# Patient Record
Sex: FEMALE | Race: WHITE | Employment: FULL TIME | URBAN - NONMETROPOLITAN AREA
[De-identification: names, ages, dates, MRNs, and addresses within clinical notes are randomized per-mention and may not be internally consistent; named-entity substitution may affect disease eponyms.]

---

## 2017-01-27 ENCOUNTER — OFFICE VISIT (OUTPATIENT)
Dept: OBGYN | Facility: OTHER | Age: 37
End: 2017-01-27
Attending: NURSE PRACTITIONER
Payer: COMMERCIAL

## 2017-01-27 VITALS
OXYGEN SATURATION: 100 % | DIASTOLIC BLOOD PRESSURE: 62 MMHG | SYSTOLIC BLOOD PRESSURE: 108 MMHG | HEART RATE: 75 BPM | BODY MASS INDEX: 27.15 KG/M2 | RESPIRATION RATE: 15 BRPM | WEIGHT: 173 LBS | HEIGHT: 67 IN

## 2017-01-27 DIAGNOSIS — N76.0 VAGINITIS AND VULVOVAGINITIS: ICD-10-CM

## 2017-01-27 DIAGNOSIS — N76.0 BV (BACTERIAL VAGINOSIS): ICD-10-CM

## 2017-01-27 DIAGNOSIS — B96.89 BV (BACTERIAL VAGINOSIS): ICD-10-CM

## 2017-01-27 DIAGNOSIS — Z11.3 SCREEN FOR STD (SEXUALLY TRANSMITTED DISEASE): Primary | ICD-10-CM

## 2017-01-27 LAB
MICRO REPORT STATUS: ABNORMAL
SPECIMEN SOURCE: ABNORMAL
WET PREP SPEC: ABNORMAL

## 2017-01-27 PROCEDURE — 87491 CHLMYD TRACH DNA AMP PROBE: CPT | Mod: 90 | Performed by: NURSE PRACTITIONER

## 2017-01-27 PROCEDURE — 99000 SPECIMEN HANDLING OFFICE-LAB: CPT | Performed by: NURSE PRACTITIONER

## 2017-01-27 PROCEDURE — 87210 SMEAR WET MOUNT SALINE/INK: CPT | Performed by: NURSE PRACTITIONER

## 2017-01-27 PROCEDURE — 99213 OFFICE O/P EST LOW 20 MIN: CPT | Performed by: NURSE PRACTITIONER

## 2017-01-27 PROCEDURE — 87591 N.GONORRHOEAE DNA AMP PROB: CPT | Mod: 90 | Performed by: NURSE PRACTITIONER

## 2017-01-27 RX ORDER — METRONIDAZOLE 500 MG/1
500 TABLET ORAL 2 TIMES DAILY
Qty: 14 TABLET | Refills: 0 | Status: SHIPPED | OUTPATIENT
Start: 2017-01-27 | End: 2017-02-16

## 2017-01-27 ASSESSMENT — PAIN SCALES - GENERAL: PAINLEVEL: NO PAIN (0)

## 2017-01-27 NOTE — MR AVS SNAPSHOT
After Visit Summary   1/27/2017    Sheridan Roldan    MRN: [id number]           Patient Information     Date Of Birth          1980        Visit Information        Provider Department      1/27/2017 9:15 AM Anne-Marie Cain NP [facility]        Today's Diagnoses     Screen for STD (sexually transmitted disease)    -  1     Vaginitis and vulvovaginitis           Care Instructions    Follow up as needed        Follow-ups after your visit        Your next 10 appointments already scheduled     May 24, 2017  9:00 AM   (Arrive by 8:45 AM)   Office Visit with Chioma Wiggins MD   [facility] ([facility])    [address]  [facility] [address]   [phone]           Bring a current list of meds and any records pertaining to this visit.  For Physicals, please bring immunization records and any forms needing to be filled out.  Please arrive 10 minutes early to complete paperwork.              Who to contact     If you have questions or need follow up information about today's clinic visit or your schedule please contact [facility] directly at [phone].  Normal or non-critical lab and imaging results will be communicated to you by [employer], letter or phone within 4 business days after the clinic has received the results. If you do not hear from us within 7 days, please contact the clinic through [employer] or phone. If you have a critical or abnormal lab result, we will notify you by phone as soon as possible.  Submit refill requests through [employer] or call your pharmacy and they will forward the refill request to us. Please allow 3 business days for your refill to be completed.          Additional Information About Your Visit        MyChart Information     [employer]es[employer] gives you secure access to your electronic health record. If you see a primary care provider, you can also send messages to your care team and make appointments. If you have questions, please  "call your primary care clinic.  If you do not have a primary care provider, please call [phone] and they will assist you.        Care EveryWhere ID     This is your Care EveryWhere ID. This could be used by other organizations to access your [place] medical records  [id number]        Your Vitals Were     Pulse Respirations Height BMI (Body Mass Index) Pulse Oximetry       75 15 5' 7\" (1.702 m) 27.09 kg/m2 100%        Blood Pressure from Last 3 Encounters:   01/27/17 108/62   08/08/16 110/64   08/01/16 120/81    Weight from Last 3 Encounters:   01/27/17 173 lb (78.472 kg)   08/08/16 173 lb (78.472 kg)   08/01/16 175 lb (79.379 kg)              We Performed the Following     Chlamydia trachomatis PCR     Neisseria gonorrhoeae PCR     Wet prep        Primary Care Provider Office Phone # Fax #    SARAH Bond [phone]       [facility] [address] [place] [id number]        Thank you!     [facility]  [facility]. Our goal is always to provide you with excellent care. Hearing back from our patients is one way we can continue to improve our services. Please take a few minutes to complete the written survey that you may receive in the mail after your visit with us. Thank you!             Your Updated Medication List - Protect others around you: Learn how to safely use, store and throw away your medicines at www.disposemymeds.org.          This list is accurate as of: 1/27/17  9:25 AM.  Always use your most recent med list.                   Brand Name Dispense Instructions for use    escitalopram 10 MG tablet    LEXAPRO    90 tablet    Take 1 tablet (10 mg) by mouth daily       levothyroxine 75 MCG tablet    SYNTHROID    90 tablet    Take 1 tablet (75 mcg) by mouth daily       MIRENA (52 MG) 20 MCG/24HR IUD   Generic drug:  levonorgestrel      1 each by Intrauterine route continuous         "

## 2017-01-27 NOTE — NURSING NOTE
"Chief Complaint   Patient presents with     STD     Screening       Initial /62 mmHg  Pulse 75  Resp 15  Ht 5' 7\" (1.702 m)  Wt 173 lb (78.472 kg)  BMI 27.09 kg/m2  SpO2 100% Estimated body mass index is 27.09 kg/(m^2) as calculated from the following:    Height as of this encounter: 5' 7\" (1.702 m).    Weight as of this encounter: 173 lb (78.472 kg).  BP completed using cuff size: andrea Maxwell      "

## 2017-01-27 NOTE — PROGRESS NOTES
"[facility]                HPI   C/O recent vaginal itching and irritation.  Mirena iud in place.  Mild yellow and pink vaginal discharge.             Medications:     Current Outpatient Prescriptions Ordered in Epic   Medication     escitalopram (LEXAPRO) 10 MG tablet     levothyroxine (SYNTHROID) 75 MCG tablet     levonorgestrel (MIRENA) 20 MCG/24HR IUD     No current Epic-ordered facility-administered medications on file.                Allergies:   Erythromycin and Penicillins         Review of Systems:   The 5 point Review of Systems is negative other than noted in the HPI                     Physical Exam:   Blood pressure 108/62, pulse 75, resp. rate 15, height 5' 7\" (1.702 m), weight 173 lb (78.472 kg), SpO2 100 %.  Constitutional:   awake, alert, cooperative, no apparent distress, and appears stated age     Genitounirinary:   External Genitalia:  General appearance; normal  Vagina:  Lesions absent, Abnormal findings: yellow curdish discharge without noticeable odor.   Cervix:  Lesions absent, Tenderness absent, IUD strings visible.              Data:   No results found for this or any previous visit (from the past 24 hour(s)).            Assessment and Plan:   Vaginitis - wet prep and G/C completed.  Will treat according to findings.      KIMBERLYN Barbour  1/27/2017  9:22 AM  "

## 2017-01-31 LAB
C TRACH DNA SPEC QL NAA+PROBE: NORMAL
N GONORRHOEA DNA SPEC QL NAA+PROBE: NORMAL
SPECIMEN SOURCE: NORMAL
SPECIMEN SOURCE: NORMAL

## 2017-02-16 ENCOUNTER — OFFICE VISIT (OUTPATIENT)
Dept: FAMILY MEDICINE | Facility: OTHER | Age: 37
End: 2017-02-16
Attending: PHYSICIAN ASSISTANT
Payer: COMMERCIAL

## 2017-02-16 VITALS
DIASTOLIC BLOOD PRESSURE: 73 MMHG | RESPIRATION RATE: 17 BRPM | HEART RATE: 69 BPM | OXYGEN SATURATION: 98 % | TEMPERATURE: 98.3 F | SYSTOLIC BLOOD PRESSURE: 121 MMHG

## 2017-02-16 DIAGNOSIS — M25.50 MULTIPLE JOINT PAIN: Primary | ICD-10-CM

## 2017-02-16 DIAGNOSIS — E04.9 GOITER: ICD-10-CM

## 2017-02-16 LAB
ERYTHROCYTE [SEDIMENTATION RATE] IN BLOOD BY WESTERGREN METHOD: 4 MM/H (ref 0–20)
TSH SERPL DL<=0.005 MIU/L-ACNC: 1.16 MU/L (ref 0.4–4)
URATE SERPL-MCNC: 2.5 MG/DL (ref 2.6–6)

## 2017-02-16 PROCEDURE — 99214 OFFICE O/P EST MOD 30 MIN: CPT | Performed by: PHYSICIAN ASSISTANT

## 2017-02-16 PROCEDURE — 86618 LYME DISEASE ANTIBODY: CPT | Mod: 90 | Performed by: PHYSICIAN ASSISTANT

## 2017-02-16 PROCEDURE — 86431 RHEUMATOID FACTOR QUANT: CPT | Mod: 90 | Performed by: PHYSICIAN ASSISTANT

## 2017-02-16 PROCEDURE — 86038 ANTINUCLEAR ANTIBODIES: CPT | Mod: 90 | Performed by: PHYSICIAN ASSISTANT

## 2017-02-16 PROCEDURE — 99000 SPECIMEN HANDLING OFFICE-LAB: CPT | Performed by: PHYSICIAN ASSISTANT

## 2017-02-16 PROCEDURE — 84550 ASSAY OF BLOOD/URIC ACID: CPT | Performed by: PHYSICIAN ASSISTANT

## 2017-02-16 PROCEDURE — 84443 ASSAY THYROID STIM HORMONE: CPT | Performed by: PHYSICIAN ASSISTANT

## 2017-02-16 PROCEDURE — 36415 COLL VENOUS BLD VENIPUNCTURE: CPT | Performed by: PHYSICIAN ASSISTANT

## 2017-02-16 PROCEDURE — 85652 RBC SED RATE AUTOMATED: CPT | Performed by: PHYSICIAN ASSISTANT

## 2017-02-16 ASSESSMENT — PAIN SCALES - GENERAL: PAINLEVEL: NO PAIN (1)

## 2017-02-16 NOTE — PATIENT INSTRUCTIONS
Thank you for choosing [facility].   I appreciate the opportunity to serve you and look forward to supporting your healthcare needs in the future. Please contact me with any questions or concerns that you may have.    Sincerely,    Danielle Waller RN, PA-C

## 2017-02-16 NOTE — MR AVS SNAPSHOT
After Visit Summary   2/16/2017    Sheridan Roldan    MRN: [id number]           Patient Information     Date Of Birth          1980        Visit Information        Provider Department      2/16/2017 10:45 AM Danielle Waller PA [facility]        Today's Diagnoses     Multiple joint pain    -  1    Goiter          Care Instructions    Thank you for choosing [facility].   I appreciate the opportunity to serve you and look forward to supporting your healthcare needs in the future. Please contact me with any questions or concerns that you may have.    Sincerely,    Danielle Waller RN, PA-C           Follow-ups after your visit        Follow-up notes from your care team     Return if symptoms worsen or fail to improve.      Your next 10 appointments already scheduled     May 24, 2017  9:00 AM CDT   (Arrive by 8:45 AM)   Office Visit with Chioma Wiggins MD   [facility] ([facility])    [address]  [facility] [address]   [phone]           Bring a current list of meds and any records pertaining to this visit.  For Physicals, please bring immunization records and any forms needing to be filled out.  Please arrive 10 minutes early to complete paperwork.              Who to contact     If you have questions or need follow up information about today's clinic visit or your schedule please contact [facility] directly at [phone].  Normal or non-critical lab and imaging results will be communicated to you by MyChart, letter or phone within 4 business days after the clinic has received the results. If you do not hear from us within 7 days, please contact the clinic through MyChart or phone. If you have a critical or abnormal lab result, we will notify you by phone as soon as possible.  Submit refill requests through My-Apps or call your pharmacy and they will forward the refill request to us. Please allow 3 business days for your  refill to be completed.          Additional Information About Your Visit        MyChart Information     [employer] gives you secure access to your electronic health record. If you see a primary care provider, you can also send messages to your care team and make appointments. If you have questions, please call your primary care clinic.  If you do not have a primary care provider, please call [phone] and they will assist you.        Care EveryWhere ID     This is your Care EveryWhere ID. This could be used by other organizations to access your [place] medical records  [id number]        Your Vitals Were     Pulse Temperature Respirations Pulse Oximetry          69 98.3  F (36.8  C) 17 98%         Blood Pressure from Last 3 Encounters:   02/16/17 121/73   01/27/17 108/62   08/08/16 110/64    Weight from Last 3 Encounters:   01/27/17 173 lb (78.5 kg)   08/08/16 173 lb (78.5 kg)   08/01/16 175 lb (79.4 kg)              We Performed the Following     Antinuclear antibody screen by EIA     Erythrocyte sedimentation rate auto     Lyme Disease Leidy with reflex to WB Serum     Rheumatoid factor     TSH with free T4 reflex     Uric acid        Primary Care Provider Office Phone # Fax #    SARAH Bond [phone]       [address] [address]        Thank you!     Thank you for choosing [facility]  for your care. Our goal is always to provide you with excellent care. Hearing back from our patients is one way we can continue to improve our services. Please take a few minutes to complete the written survey that you may receive in the mail after your visit with us. Thank you!             Your Updated Medication List - Protect others around you: Learn how to safely use, store and throw away your medicines at www.disposemymeds.org.          This list is accurate as of: 2/16/17 11:57 AM.  Always use your most recent med list.                   Brand  Name Dispense Instructions for use    escitalopram 10 MG tablet    LEXAPRO    90 tablet    Take 1 tablet (10 mg) by mouth daily       levothyroxine 75 MCG tablet    SYNTHROID    90 tablet    Take 1 tablet (75 mcg) by mouth daily       MIRENA (52 MG) 20 MCG/24HR IUD   Generic drug:  levonorgestrel      1 each by Intrauterine route continuous       VITAMIN C PO      Take 500 mg by mouth daily       VITAMIN D (CHOLECALCIFEROL) PO      Take 6,000 Units by mouth daily

## 2017-02-16 NOTE — NURSING NOTE
"Chief Complaint   Patient presents with     Derm Problem       Initial /73  Pulse 69  Temp 98.3  F (36.8  C)  Resp 17  SpO2 98% Estimated body mass index is 27.1 kg/(m^2) as calculated from the following:    Height as of 1/27/17: 5' 7\" (1.702 m).    Weight as of 1/27/17: 173 lb (78.5 kg).  Medication Reconciliation: complete  "

## 2017-02-16 NOTE — PROGRESS NOTES
SUBJECTIVE:                                                    Sheridan Roldan is a 36 year old female who presents to clinic today for the following health issues:        Musculoskeletal problem/pain      Duration: 1 month     Description  Location: right great toe, right finger, left ankle stiffness     Intensity:  mild    Accompanying signs and symptoms: red rash on top of right hand, joint pain,     History  Previous similar problem: no   Previous evaluation:  none    Precipitating or alleviating factors:  Trauma or overuse: no   Aggravating factors include: none     Therapies tried and outcome: nothing           Problem list and histories reviewed & adjusted, as indicated.  Additional history: as documented    Patient Active Problem List   Diagnosis     Family planning     Encounter for routine gynecological examination     Fatigue     Mastodynia     Goiter     Chronic tonsillitis     Tonsillar hypertrophy     Papanicolaou smear of cervix with low grade squamous intraepithelial lesion (LGSIL)     High risk HPV infection     RC II (cervical intraepithelial neoplasia II)     NO SHOW     Past Surgical History   Procedure Laterality Date     Abdominoplasty       Mammoplasty augmentation bilateral       Conization leep N/A 8/1/2016     Procedure: CONIZATION LEEP;  Surgeon: Chioma Wiggins MD;  Location: [facility] OR       Social History   Substance Use Topics     Smoking status: Former Smoker     Quit date: 1/1/2002     Smokeless tobacco: Never Used     Alcohol use Yes      Comment: socially     Family History   Problem Relation Age of Onset     Hypertension Father      Other - See Comments Father      lymphoma     Uterine Cancer Cousin          Current Outpatient Prescriptions   Medication Sig Dispense Refill     VITAMIN D, CHOLECALCIFEROL, PO Take 6,000 Units by mouth daily       Ascorbic Acid (VITAMIN C PO) Take 500 mg by mouth daily       escitalopram (LEXAPRO) 10 MG tablet Take 1 tablet (10 mg) by mouth  daily 90 tablet 3     levothyroxine (SYNTHROID) 75 MCG tablet Take 1 tablet (75 mcg) by mouth daily 90 tablet 4     levonorgestrel (MIRENA) 20 MCG/24HR IUD 1 each by Intrauterine route continuous        Allergies   Allergen Reactions     Erythromycin Nausea and Vomiting     Erythromycin base     Penicillins Rash     BP Readings from Last 3 Encounters:   02/16/17 121/73   01/27/17 108/62   08/08/16 110/64    Wt Readings from Last 3 Encounters:   01/27/17 173 lb (78.5 kg)   08/08/16 173 lb (78.5 kg)   08/01/16 175 lb (79.4 kg)                  Problem list, Medication list, Allergies, and Medical/Social/Surgical histories reviewed in [facility] and updated as appropriate.    ROS:  Constitutional, neuro, ENT, endocrine, pulmonary, cardiac, gastrointestinal, genitourinary, musculoskeletal, integument and psychiatric systems are negative, except as otherwise noted.    OBJECTIVE:                                                    /73  Pulse 69  Temp 98.3  F (36.8  C)  Resp 17  SpO2 98%  There is no height or weight on file to calculate BMI.  GENERAL APPEARANCE: healthy, alert and no distress  EYES: Eyes grossly normal to inspection, PERRL and conjunctivae and sclerae normal  HENT: ear canals and TM's normal and nose and mouth without ulcers or lesions  NECK: no adenopathy, no asymmetry, masses, or scars and thyroid normal to palpation  RESP: lungs clear to auscultation - no rales, rhonchi or wheezes  CV: regular rates and rhythm, normal S1 S2, no S3 or S4 and no murmur, click or rub  LYMPHATICS: normal ant/post cervical and supraclavicular nodes  ABDOMEN: soft, nontender, without hepatosplenomegaly or masses and bowel sounds normal  MS: extremities normal- no gross deformities noted. Stiffness in her 3rd digit.  Great toe on her left foot is painful with a large bunion.   SKIN: no suspicious lesions  round  Lesion on her hand that is not warm or painful.    NEURO: Normal strength and tone, mentation intact and speech  normal  PSYCH: mentation appears normal and affect normal/bright    Diagnostic test results:  Diagnostic Test Results:  No results found for this or any previous visit (from the past 24 hour(s)).   .Rheumatology panel is negative.   ASSESSMENT/PLAN:                                                    1. Multiple joint pain  She will be having below done.  This is scattered. No pattern.  Joints are stiff.  No warmth  Noted.  Small lesion was present before and went away with Cortizone.   Does look nummular.   Discussion if they recur again I would like to bx the lesion.   Could be more of a skin manifestation rather than systemic.   - Erythrocyte sedimentation rate auto  - Rheumatoid factor  - Antinuclear antibody screen by EIA  - Uric acid  - Lyme Disease Leidy with reflex to WB Serum    35 minutes in visit over 50 % in face to face visit.   See Patient Instructions    SARAH Brennan  [facility]

## 2017-02-19 LAB — RHEUMATOID FACT SER NEPH-ACNC: <20 IU/ML (ref 0–20)

## 2017-02-20 LAB
ANA SER QL IA: NORMAL
B BURGDOR IGG+IGM SER QL: 0.17 (ref 0–0.89)

## 2017-03-03 DIAGNOSIS — E04.9 GOITER: ICD-10-CM

## 2017-03-03 RX ORDER — LEVOTHYROXINE SODIUM 75 UG/1
75 TABLET ORAL DAILY
Qty: 90 TABLET | Refills: 4 | Status: SHIPPED | OUTPATIENT
Start: 2017-03-03 | End: 2018-03-21

## 2017-05-24 ENCOUNTER — OFFICE VISIT (OUTPATIENT)
Dept: OBGYN | Facility: OTHER | Age: 37
End: 2017-05-24
Attending: OBSTETRICS & GYNECOLOGY
Payer: COMMERCIAL

## 2017-05-24 VITALS
SYSTOLIC BLOOD PRESSURE: 90 MMHG | DIASTOLIC BLOOD PRESSURE: 62 MMHG | HEIGHT: 67 IN | WEIGHT: 171 LBS | BODY MASS INDEX: 26.84 KG/M2 | HEART RATE: 76 BPM

## 2017-05-24 DIAGNOSIS — Z12.4 SCREENING FOR CERVICAL CANCER: ICD-10-CM

## 2017-05-24 DIAGNOSIS — Z20.2 POSSIBLE EXPOSURE TO STD: ICD-10-CM

## 2017-05-24 DIAGNOSIS — Z00.00 ROUTINE GENERAL MEDICAL EXAMINATION AT A HEALTH CARE FACILITY: Primary | ICD-10-CM

## 2017-05-24 LAB
MICRO REPORT STATUS: ABNORMAL
SPECIMEN SOURCE: ABNORMAL
WET PREP SPEC: ABNORMAL

## 2017-05-24 PROCEDURE — 87591 N.GONORRHOEAE DNA AMP PROB: CPT | Mod: 90 | Performed by: OBSTETRICS & GYNECOLOGY

## 2017-05-24 PROCEDURE — 99000 SPECIMEN HANDLING OFFICE-LAB: CPT | Performed by: OBSTETRICS & GYNECOLOGY

## 2017-05-24 PROCEDURE — 87624 HPV HI-RISK TYP POOLED RSLT: CPT | Mod: 90 | Performed by: OBSTETRICS & GYNECOLOGY

## 2017-05-24 PROCEDURE — 88142 CYTOPATH C/V THIN LAYER: CPT | Performed by: OBSTETRICS & GYNECOLOGY

## 2017-05-24 PROCEDURE — 99395 PREV VISIT EST AGE 18-39: CPT | Performed by: OBSTETRICS & GYNECOLOGY

## 2017-05-24 PROCEDURE — 87210 SMEAR WET MOUNT SALINE/INK: CPT | Performed by: OBSTETRICS & GYNECOLOGY

## 2017-05-24 PROCEDURE — 87491 CHLMYD TRACH DNA AMP PROBE: CPT | Mod: 90 | Performed by: OBSTETRICS & GYNECOLOGY

## 2017-05-24 ASSESSMENT — ANXIETY QUESTIONNAIRES
6. BECOMING EASILY ANNOYED OR IRRITABLE: NOT AT ALL
1. FEELING NERVOUS, ANXIOUS, OR ON EDGE: NOT AT ALL
5. BEING SO RESTLESS THAT IT IS HARD TO SIT STILL: NOT AT ALL
7. FEELING AFRAID AS IF SOMETHING AWFUL MIGHT HAPPEN: NOT AT ALL
3. WORRYING TOO MUCH ABOUT DIFFERENT THINGS: SEVERAL DAYS
IF YOU CHECKED OFF ANY PROBLEMS ON THIS QUESTIONNAIRE, HOW DIFFICULT HAVE THESE PROBLEMS MADE IT FOR YOU TO DO YOUR WORK, TAKE CARE OF THINGS AT HOME, OR GET ALONG WITH OTHER PEOPLE: NOT DIFFICULT AT ALL
GAD7 TOTAL SCORE: 1
2. NOT BEING ABLE TO STOP OR CONTROL WORRYING: NOT AT ALL

## 2017-05-24 ASSESSMENT — PATIENT HEALTH QUESTIONNAIRE - PHQ9: 5. POOR APPETITE OR OVEREATING: NOT AT ALL

## 2017-05-24 NOTE — MR AVS SNAPSHOT
After Visit Summary   5/24/2017    Sheridan Roldan    MRN: [id number]           Patient Information     Date Of Birth          1980        Visit Information        Provider Department      5/24/2017 9:00 AM [facility]        Today's Diagnoses     Routine general medical examination at a health care facility    -  1    Possible exposure to STD        Screening for cervical cancer          Care Instructions    Pap smear and STD testing done today.  Lab work to be done tomorrow.  Return for annual exam.                      Follow-ups after your visit        Future tests that were ordered for you today     Open Future Orders        Priority Expected Expires Ordered    Anti Treponema Routine 5/24/2017 7/28/2017 5/24/2017    Hepatitis B surface antigen Routine 5/24/2017 7/28/2017 5/24/2017    Hepatitis C antibody Routine 5/24/2017 7/28/2017 5/24/2017    HIV Antigen Antibody Combo Routine 5/24/2017 7/28/2017 5/24/2017    TSH Routine 5/25/2017 7/28/2017 5/24/2017    Lipid Profile Routine 5/25/2017 7/28/2017 5/24/2017    Glucose Routine 5/25/2017 7/28/2017 5/24/2017    CBC with platelets Routine 5/25/2017 7/28/2017 5/24/2017    Hemoglobin A1c Routine 5/25/2017 7/28/2017 5/24/2017    Vitamin D Deficiency Routine 5/25/2017 7/28/2017 5/24/2017            Who to contact     If you have questions or need follow up information about today's clinic visit or your schedule please contact [facility] directly at [phone].  Normal or non-critical lab and imaging results will be communicated to you by MyChart, letter or phone within 4 business days after the clinic has received the results. If you do not hear from us within 7 days, please contact the clinic through MyChart or phone. If you have a critical or abnormal lab result, we will notify you by phone as soon as possible.  Submit refill requests through [employer] or call your pharmacy and they will forward  "the refill request to us. Please allow 3 business days for your refill to be completed.          Additional Information About Your Visit        ZangZinghart Information     [employer] gives you secure access to your electronic health record. If you see a primary care provider, you can also send messages to your care team and make appointments. If you have questions, please call your primary care clinic.  If you do not have a primary care provider, please call [phone] and they will assist you.        Care EveryWhere ID     This is your Care EveryWhere ID. This could be used by other organizations to access your [place] medical records  [id number]        Your Vitals Were     Pulse Height BMI (Body Mass Index)             76 5' 7\" (1.702 m) 26.78 kg/m2          Blood Pressure from Last 3 Encounters:   05/24/17 90/62   02/16/17 121/73   01/27/17 108/62    Weight from Last 3 Encounters:   05/24/17 171 lb (77.6 kg)   01/27/17 173 lb (78.5 kg)   08/08/16 173 lb (78.5 kg)              We Performed the Following     A pap thin layer screen with  HPV - recommended age 30 - 65 years (select HPV order below)     CHLAMYDIA TRACHOMATIS PCR     HPV High Risk Types DNA Cervical     NEISSERIA GONORRHOEA PCR     Wet prep        Primary Care Provider Office Phone # Fax #    SARAH Bond [phone]       [facility] [address] [place] [address]  [facility] [address]        Thank you!     Thank you for choosing [facility]  for your care. Our goal is always to provide you with excellent care. Hearing back from our patients is one way we can continue to improve our services. Please take a few minutes to complete the written survey that you may receive in the mail after your visit with us. Thank you!             Your Updated Medication List - Protect others around you: Learn how to safely use, store and throw away your medicines at www.disposemymeds.org.          This list is accurate as of: 5/24/17  " 9:16 AM.  Always use your most recent med list.                   Brand Name Dispense Instructions for use    escitalopram 10 MG tablet    LEXAPRO    90 tablet    Take 1 tablet (10 mg) by mouth daily       levothyroxine 75 MCG tablet    SYNTHROID    90 tablet    Take 1 tablet (75 mcg) by mouth daily       MIRENA (52 MG) 20 MCG/24HR IUD   Generic drug:  levonorgestrel      1 each by Intrauterine route continuous       VITAMIN C PO      Take 500 mg by mouth daily       VITAMIN D (CHOLECALCIFEROL) PO      Take 6,000 Units by mouth daily

## 2017-05-24 NOTE — PROGRESS NOTES
ANNUAL    Sheridan is a 36 year old  female who presents for annual exam.   yc 9  LC 8#  Gdm n  hpt n  No LMP recorded. Patient is not currently having periods (Reason: IUD).  Menses: n pain n Contraception Mirena.  Planning pregnancy: n  Concerns in addition to routine health maintenance?  BTO.  GYNECOLOGIC HISTORY:   Surgery: n  History sexually transmitted infections:No STD history   Safe?  y  STI testing offered?  y  History of abnormal Pap smear: y  Problems with sex? n  Family history of: breast CA: n   Uterine pc  ovarian CA: n   colon CA: n    HEALTH MAINTENANCE:  Cholesterol: (  Cholesterol   Date Value Ref Range Status   2015 113 <200 mg/dL Final     Comment:     LDL Cholesterol is the primary guide to therapy.   The NCEP recommends further evaluation of: patients with cholesterol greater   than 200 mg/dL if additional risk factors are present, cholesterol greater   than   240 mg/dL, triglycerides greater than 150 mg/dL, or HDL less than 40 mg/dL.      History of abnormal lipids: n  Mammo: y . History of abnormal Mammo:n   Regular Self Breast Exams: n Calcium/Vitamin D or Vit: y  Exercise n    TSH: (  TSH   Date Value Ref Range Status   2017 1.16 0.40 - 4.00 mU/L Final    )  Pap; (  Lab Results   Component Value Date    PAP LSIL 2016    PAP NIL 2014    )    HISTORY:  Obstetric History       T2      L2     SAB0   TAB0   Ectopic0   Multiple0   Live Births0       # Outcome Date GA Lbr Renato/2nd Weight Sex Delivery Anes PTL Lv   2 Term 2008 39w0d  8 lb 2 oz (3.685 kg) F Vag-Spont   RITA      Name: Araceli   1 Term 2006 41w0d 08:00 7 lb 10 oz (3.459 kg) F Vag-Spont   RITA      Name: Bladimir        Past Medical History:   Diagnosis Date     Acute pharyngitis 2000     Anxiety state, unspecified 3/12/2007     Breech presentation without mention of version, antepartum 1/10/2006     Galactorrhea not associated with childbirth 05/10/2005     Leukorrhea, not specified as  infective 2005     Post term pregnancy, antepartum condition or complication 2006     Routine general medical examination at a health care facility 2004     Spotting complicating pregnancy, antepartum condition or complication 2008     Supervision of other normal pregnancy 2005     Threatened , antepartum 2007     Past Surgical History:   Procedure Laterality Date     ABDOMINOPLASTY       CONIZATION LEEP N/A 2016    Procedure: CONIZATION LEEP;  Surgeon: Chioma Wiggins MD;  Location: [facility]     MAMMOPLASTY AUGMENTATION BILATERAL       Family History   Problem Relation Age of Onset     Hypertension Father      Other - See Comments Father      lymphoma     Uterine Cancer Cousin      Social History     Social History     Marital status:      Spouse name: N/A     Number of children: N/A     Years of education: N/A     Occupational History     Rn [employer] [place]     Social History Main Topics     Smoking status: Former Smoker     Quit date: 2002     Smokeless tobacco: Never Used     Alcohol use Yes      Comment: socially     Drug use: No     Sexual activity: Yes     Partners: Male     Other Topics Concern     Caffeine Concern Yes     coffee 3 cups     Parent/Sibling W/ Cabg, Mi Or Angioplasty Before 65f 55m? No     Social History Narrative       Current Outpatient Prescriptions:      levothyroxine (SYNTHROID) 75 MCG tablet, Take 1 tablet (75 mcg) by mouth daily, Disp: 90 tablet, Rfl: 4     VITAMIN D, CHOLECALCIFEROL, PO, Take 6,000 Units by mouth daily, Disp: , Rfl:      Ascorbic Acid (VITAMIN C PO), Take 500 mg by mouth daily, Disp: , Rfl:      escitalopram (LEXAPRO) 10 MG tablet, Take 1 tablet (10 mg) by mouth daily, Disp: 90 tablet, Rfl: 3     levonorgestrel (MIRENA) 20 MCG/24HR IUD, 1 each by Intrauterine route continuous , Disp: , Rfl:      Allergies   Allergen Reactions     Erythromycin Nausea and Vomiting     Erythromycin base     Penicillins Rash  "      Past medical, surgical, social and family history were reviewed and updated in EPIC.    ROS:    C:     NEGATIVE for fever, chills, change in weight  I:       NEGATIVE for worrisome rashes, moles or lesions  E:     NEGATIVE for vision changes or irritation  E/M: NEGATIVE for ear, mouth and throat problems  R:     NEGATIVE for significant cough or SOB  CV:   NEGATIVE for chest pain, palpitations or peripheral edema  GI:     NEGATIVE for nausea, abdominal pain, heartburn, or change in bowel habits  :   NEGATIVE for frequency, dysuria, hematuria, vaginal discharge, or irregular bleeding,incontinence   M:     NEGATIVE for significant arthralgias or myalgia  N:      NEGATIVE for weakness, dizziness or paresthesias  E:      NEGATIVE for temperature intolerance, skin/hair changes  P:      NEGATIVE for changes in mood or affect.     EXAM:   BP 90/62  Pulse 76  Ht 5' 7\" (1.702 m)  Wt 171 lb (77.6 kg)  BMI 26.78 kg/m2   BMI: Body mass index is 26.78 kg/(m^2).  Constitutional: healthy, alert and no distress  Head: Normocephalic. No masses, lesions, tenderness or abnormalities  Neck: Neck supple. Trachea midline. No adenopathy. Thyroid symmetric, normal size.   Cardiovascular: RRR.   Respiratory: lungs clear   Breast: Breasts reveal mild symmetric fibrocystic densities, but there are no dominant, discrete, fixed or suspicious masses found.  Gastrointestinal: Abdomen soft, non-tender, non-distended. No masses, organomegaly.  :  Vulva:  No external lesions, normal female hair distribution, no inguinal adenopathy.    Urethra:  Midline, non-tender, well supported, no discharge  Vagina:  Moist, pink, no abnormal discharge, no lesions  Cervix: clean string in place  Uterus:   Normal size,  , non-tender, freely mobile  Ovaries:  No masses appreciated  Rectal Exam: not done  Musculoskeletal: extremities normal  Skin: no suspicious lesions or rashes  Psychiatric: Affect appropriate, cooperative,mentation appears normal. "     COUNSELING:   regular exercise  healthy diet/nutrition  Immunizations   contraception  family planning  Folic Acid Counseling     reports that she quit smoking about 15 years ago. She has never used smokeless tobacco.  Tobacco Cessation Action Plan: na  Body mass index is 26.78 kg/(m^2).  Weight management plan: working on it  FRAX Risk Assessment    ASSESSMENT:  36 year old female with satisfactory annual exam  With hx abn pap  PLAN:   Pap with HR hpv,gc,ct,hep, trep,hiv  Tsh,fasting chol with lipid profile,fasting blood sugar,hp,hga1c,Vit D   All labs tomorrow  CheckMIIC  rto 1 year     Greater than 0 minutes spent discussing other than annual     Chioma Wiggins MD

## 2017-05-24 NOTE — NURSING NOTE
"Chief Complaint   Patient presents with     Gyn Exam       Initial BP 90/62  Pulse 76  Ht 5' 7\" (1.702 m)  Wt 171 lb (77.6 kg)  BMI 26.78 kg/m2 Estimated body mass index is 26.78 kg/(m^2) as calculated from the following:    Height as of this encounter: 5' 7\" (1.702 m).    Weight as of this encounter: 171 lb (77.6 kg).  Medication Reconciliation: vince Dave      "

## 2017-05-24 NOTE — PATIENT INSTRUCTIONS
Pap smear and STD testing done today.  Lab work to be done tomorrow.  Return for annual exam.

## 2017-05-25 ASSESSMENT — PATIENT HEALTH QUESTIONNAIRE - PHQ9: SUM OF ALL RESPONSES TO PHQ QUESTIONS 1-9: 5

## 2017-05-25 ASSESSMENT — ANXIETY QUESTIONNAIRES: GAD7 TOTAL SCORE: 1

## 2017-05-26 DIAGNOSIS — Z00.00 ROUTINE GENERAL MEDICAL EXAMINATION AT A HEALTH CARE FACILITY: ICD-10-CM

## 2017-05-26 DIAGNOSIS — Z20.2 POSSIBLE EXPOSURE TO STD: ICD-10-CM

## 2017-05-26 LAB
CHOLEST SERPL-MCNC: 140 MG/DL
ERYTHROCYTE [DISTWIDTH] IN BLOOD BY AUTOMATED COUNT: 12.7 % (ref 10–15)
EST. AVERAGE GLUCOSE BLD GHB EST-MCNC: 94 MG/DL
GLUCOSE SERPL-MCNC: 95 MG/DL (ref 70–99)
HBA1C MFR BLD: 4.9 % (ref 4.3–6)
HCT VFR BLD AUTO: 40.2 % (ref 35–47)
HDLC SERPL-MCNC: 62 MG/DL
HGB BLD-MCNC: 14 G/DL (ref 11.7–15.7)
LDLC SERPL CALC-MCNC: 69 MG/DL
MCH RBC QN AUTO: 29.9 PG (ref 26.5–33)
MCHC RBC AUTO-ENTMCNC: 34.8 G/DL (ref 31.5–36.5)
MCV RBC AUTO: 86 FL (ref 78–100)
NONHDLC SERPL-MCNC: 78 MG/DL
PLATELET # BLD AUTO: 194 10E9/L (ref 150–450)
RBC # BLD AUTO: 4.68 10E12/L (ref 3.8–5.2)
TRIGL SERPL-MCNC: 43 MG/DL
TSH SERPL DL<=0.05 MIU/L-ACNC: 1.34 MU/L (ref 0.4–4)
WBC # BLD AUTO: 4.3 10E9/L (ref 4–11)

## 2017-05-26 PROCEDURE — 85027 COMPLETE CBC AUTOMATED: CPT | Performed by: OBSTETRICS & GYNECOLOGY

## 2017-05-26 PROCEDURE — 84443 ASSAY THYROID STIM HORMONE: CPT | Performed by: OBSTETRICS & GYNECOLOGY

## 2017-05-26 PROCEDURE — 83036 HEMOGLOBIN GLYCOSYLATED A1C: CPT | Performed by: OBSTETRICS & GYNECOLOGY

## 2017-05-26 PROCEDURE — 87340 HEPATITIS B SURFACE AG IA: CPT | Mod: 90 | Performed by: OBSTETRICS & GYNECOLOGY

## 2017-05-26 PROCEDURE — 87389 HIV-1 AG W/HIV-1&-2 AB AG IA: CPT | Mod: 90 | Performed by: OBSTETRICS & GYNECOLOGY

## 2017-05-26 PROCEDURE — 80061 LIPID PANEL: CPT | Performed by: OBSTETRICS & GYNECOLOGY

## 2017-05-26 PROCEDURE — 86803 HEPATITIS C AB TEST: CPT | Mod: 90 | Performed by: OBSTETRICS & GYNECOLOGY

## 2017-05-26 PROCEDURE — 82306 VITAMIN D 25 HYDROXY: CPT | Mod: 90 | Performed by: OBSTETRICS & GYNECOLOGY

## 2017-05-26 PROCEDURE — 82947 ASSAY GLUCOSE BLOOD QUANT: CPT | Performed by: OBSTETRICS & GYNECOLOGY

## 2017-05-26 PROCEDURE — 36415 COLL VENOUS BLD VENIPUNCTURE: CPT | Performed by: OBSTETRICS & GYNECOLOGY

## 2017-05-26 PROCEDURE — 99000 SPECIMEN HANDLING OFFICE-LAB: CPT | Performed by: OBSTETRICS & GYNECOLOGY

## 2017-05-26 PROCEDURE — 86780 TREPONEMA PALLIDUM: CPT | Mod: 90 | Performed by: OBSTETRICS & GYNECOLOGY

## 2017-05-27 LAB — T PALLIDUM IGG+IGM SER QL: NEGATIVE

## 2017-05-30 DIAGNOSIS — R23.8 PIMPLES: Primary | ICD-10-CM

## 2017-05-30 LAB
DEPRECATED CALCIDIOL+CALCIFEROL SERPL-MC: 32 UG/L (ref 20–75)
HBV SURFACE AG SERPL QL IA: NONREACTIVE
HCV AB SERPL QL IA: NORMAL
HIV 1+2 AB+HIV1 P24 AG SERPL QL IA: NORMAL

## 2017-05-30 RX ORDER — CEPHALEXIN 500 MG/1
500 CAPSULE ORAL 3 TIMES DAILY
Qty: 30 CAPSULE | Refills: 0 | Status: SHIPPED | OUTPATIENT
Start: 2017-05-30 | End: 2017-10-05

## 2017-05-31 PROBLEM — R87.610 PAPANICOLAOU SMEAR OF CERVIX WITH ATYPICAL SQUAMOUS CELLS OF UNDETERMINED SIGNIFICANCE (ASC-US): Status: ACTIVE | Noted: 2017-05-31

## 2017-05-31 LAB
COPATH REPORT: ABNORMAL
PAP: ABNORMAL

## 2017-06-01 DIAGNOSIS — B37.31 YEAST INFECTION OF THE VAGINA: Primary | ICD-10-CM

## 2017-06-01 LAB
FINAL DIAGNOSIS: NORMAL
HPV HR 12 DNA CVX QL NAA+PROBE: NEGATIVE
HPV16 DNA SPEC QL NAA+PROBE: NEGATIVE
HPV18 DNA SPEC QL NAA+PROBE: NEGATIVE
SPECIMEN DESCRIPTION: NORMAL

## 2017-06-01 RX ORDER — FLUCONAZOLE 150 MG/1
150 TABLET ORAL
Qty: 4 TABLET | Refills: 0 | Status: SHIPPED | OUTPATIENT
Start: 2017-06-01 | End: 2017-10-05

## 2017-06-15 ENCOUNTER — TELEPHONE (OUTPATIENT)
Dept: OBGYN | Facility: OTHER | Age: 37
End: 2017-06-15

## 2017-06-15 DIAGNOSIS — B96.89 BACTERIAL VAGINOSIS: Primary | ICD-10-CM

## 2017-06-15 DIAGNOSIS — N76.0 BACTERIAL VAGINOSIS: Primary | ICD-10-CM

## 2017-06-15 RX ORDER — METRONIDAZOLE 500 MG/1
500 TABLET ORAL 2 TIMES DAILY
Qty: 14 TABLET | Refills: 0
Start: 2017-06-15 | End: 2017-06-22

## 2017-06-15 NOTE — TELEPHONE ENCOUNTER
Patient of Dr. Wiggins was treated for BV with 2 grams Flagyl x 1 after her appt 5/24/17. Symptoms went away, but now is having itching and foul, green, fishy smelly discharge again and is pretty uncomfortable. Has new partner, but does not think she has Gonorrhea or Chlamydia because she just had negative test at her appt few weeks ago.   Does not want to wait until Dr. Wiggins is back on Monday and wants to know what to do.     Ext [phone]

## 2017-06-15 NOTE — TELEPHONE ENCOUNTER
Patient notified by phone. I called RX to [facility]  Please sign order so goes on her med list. It is ready to sign in meds and orders.

## 2017-06-24 DIAGNOSIS — F32.1 MAJOR DEPRESSIVE DISORDER, SINGLE EPISODE, MODERATE (H): ICD-10-CM

## 2017-06-27 RX ORDER — ESCITALOPRAM OXALATE 10 MG/1
TABLET ORAL
Qty: 90 TABLET | Refills: 0 | Status: SHIPPED | OUTPATIENT
Start: 2017-06-27 | End: 2018-03-21

## 2017-09-13 ENCOUNTER — TELEPHONE (OUTPATIENT)
Dept: OBGYN | Facility: OTHER | Age: 37
End: 2017-09-13

## 2017-09-13 DIAGNOSIS — R11.0 NAUSEA: Primary | ICD-10-CM

## 2017-09-13 DIAGNOSIS — Z30.431 INTRAUTERINE DEVICE SURVEILLANCE: ICD-10-CM

## 2017-09-13 DIAGNOSIS — R11.0 NAUSEA: ICD-10-CM

## 2017-09-13 LAB — B-HCG SERPL-ACNC: <1 IU/L (ref 0–5)

## 2017-09-13 PROCEDURE — 36415 COLL VENOUS BLD VENIPUNCTURE: CPT | Performed by: OBSTETRICS & GYNECOLOGY

## 2017-09-13 PROCEDURE — 84702 CHORIONIC GONADOTROPIN TEST: CPT | Performed by: OBSTETRICS & GYNECOLOGY

## 2017-09-13 NOTE — TELEPHONE ENCOUNTER
Patient requested Quantitative HCG. Has an IUD, but has felt nauseated for about a week. Order done. She will check her IUD strings as she has not done that in a long time.

## 2017-09-15 ENCOUNTER — ALLIED HEALTH/NURSE VISIT (OUTPATIENT)
Dept: INTERNAL MEDICINE | Facility: OTHER | Age: 37
End: 2017-09-15
Attending: NURSE PRACTITIONER
Payer: COMMERCIAL

## 2017-09-15 DIAGNOSIS — J03.90 ACUTE TONSILLITIS, UNSPECIFIED ETIOLOGY: Primary | ICD-10-CM

## 2017-09-15 DIAGNOSIS — R07.0 THROAT PAIN: Primary | ICD-10-CM

## 2017-09-15 DIAGNOSIS — Z53.9 NO SHOW: ICD-10-CM

## 2017-09-15 LAB
DEPRECATED S PYO AG THROAT QL EIA: NORMAL
SPECIMEN SOURCE: NORMAL

## 2017-09-15 PROCEDURE — 87880 STREP A ASSAY W/OPTIC: CPT | Performed by: NURSE PRACTITIONER

## 2017-09-15 PROCEDURE — 87081 CULTURE SCREEN ONLY: CPT | Performed by: NURSE PRACTITIONER

## 2017-09-15 RX ORDER — PREDNISONE 20 MG/1
20 TABLET ORAL DAILY
Qty: 5 TABLET | Refills: 0 | Status: SHIPPED | OUTPATIENT
Start: 2017-09-15 | End: 2017-10-05

## 2017-09-15 RX ORDER — AZITHROMYCIN 250 MG/1
TABLET, FILM COATED ORAL
Qty: 6 TABLET | Refills: 0 | Status: SHIPPED | OUTPATIENT
Start: 2017-09-15 | End: 2017-10-05

## 2017-09-15 NOTE — MR AVS SNAPSHOT
After Visit Summary   9/15/2017    Sheridan Roldan    MRN: [id number]           Patient Information     Date Of Birth          1980        Visit Information        Provider Department      9/15/2017 4:15 PM HC IM NURSE [facility]        Today's Diagnoses     Throat pain    -  1    NO SHOW           Follow-ups after your visit        Your next 10 appointments already scheduled     Oct 05, 2017 11:00 AM CDT   (Arrive by 10:45 AM)   Office Visit with Kalin Hernandez MD   [facility] ([facility] )    [address] [address]   [phone]           Bring a current list of meds and any records pertaining to this visit.  For Physicals, please bring immunization records and any forms needing to be filled out.  Please arrive 15 minutes early to complete paperwork and register            Jun 06, 2018  2:00 PM CDT   (Arrive by 1:45 PM)   PHYSICAL with Chioma Wiggins MD   [facility] ([facility] )    [address] [address]   [phone]              Who to contact     If you have questions or need follow up information about today's clinic visit or your schedule please contact [facility] directly at [phone].  Normal or non-critical lab and imaging results will be communicated to you by MyChart, letter or phone within 4 business days after the clinic has received the results. If you do not hear from us within 7 days, please contact the clinic through Netccmhart or phone. If you have a critical or abnormal lab result, we will notify you by phone as soon as possible.  Submit refill requests through Adform or call your pharmacy and they will forward the refill request to us. Please allow 3 business days for your refill to be completed.          Additional Information About Your Visit        [employer] Information     Adform gives you secure access to your electronic health record. If  you see a primary care provider, you can also send messages to your care team and make appointments. If you have questions, please call your primary care clinic.  If you do not have a primary care provider, please call [phone] and they will assist you.        Care EveryWhere ID     This is your Care EveryWhere ID. This could be used by other organizations to access your [place] medical records  [id number]         Blood Pressure from Last 3 Encounters:   05/24/17 90/62   02/16/17 121/73   01/27/17 108/62    Weight from Last 3 Encounters:   05/24/17 171 lb (77.6 kg)   01/27/17 173 lb (78.5 kg)   08/08/16 173 lb (78.5 kg)              We Performed the Following     Beta strep group A culture     Rapid strep screen          Today's Medication Changes          These changes are accurate as of: 9/15/17 11:59 PM.  If you have any questions, ask your nurse or doctor.               Start taking these medicines.        Dose/Directions    azithromycin 250 MG tablet   Commonly known as:  ZITHROMAX   Used for:  Acute tonsillitis, unspecified etiology   Started by:  Orion Gallagher NP        Two tablets first day, then one tablet daily for four days.   Quantity:  6 tablet   Refills:  0       predniSONE 20 MG tablet   Commonly known as:  DELTASONE   Used for:  Acute tonsillitis, unspecified etiology   Started by:  Orion Gallagher NP        Dose:  20 mg   Take 1 tablet (20 mg) by mouth daily   Quantity:  5 tablet   Refills:  0            Where to get your medicines      These medications were sent to [facility] - [facility] - [address]  [address] [place] [address]     Phone:  [phone]     azithromycin 250 MG tablet    predniSONE 20 MG tablet                Primary Care Provider Office Phone # Fax #    SARAH Bond [phone]       [facility] [place] [address]  [place] [address]        Equal Access to Services     AMBROSIO ROBERTSON AH: Jimenez caro  Sonancy, waaxda luqadaha, qaybta kaalmada dean, lizz rodriguez crispinkathy surayo victor hugo. So [facility] [phone].    ATENCIÓN: Si danitza diaz, tiene a schulz disposición servicios gratuitos de asistencia lingüística. Mathieu al [phone].    We comply with applicable federal civil rights laws and [place] laws. We do not discriminate on the basis of race, color, national origin, age, disability sex, sexual orientation or gender identity.            Thank you!     Thank you for choosing [facility]  for your care. Our goal is always to provide you with excellent care. Hearing back from our patients is one way we can continue to improve our services. Please take a few minutes to complete the written survey that you may receive in the mail after your visit with us. Thank you!             Your Updated Medication List - Protect others around you: Learn how to safely use, store and throw away your medicines at www.disposemymeds.org.          This list is accurate as of: 9/15/17 11:59 PM.  Always use your most recent med list.                   Brand Name Dispense Instructions for use Diagnosis    azithromycin 250 MG tablet    ZITHROMAX    6 tablet    Two tablets first day, then one tablet daily for four days.    Acute tonsillitis, unspecified etiology       cephALEXin 500 MG capsule    KEFLEX    30 capsule    Take 1 capsule (500 mg) by mouth 3 times daily    Pimples       escitalopram 10 MG tablet    LEXAPRO    90 tablet    TAKE 1 TABLET BY MOUTH DAILY    Major depressive disorder, single episode, moderate (H)       fluconazole 150 MG tablet    DIFLUCAN    4 tablet    Take 1 tablet (150 mg) by mouth every 3 days    Yeast infection of the vagina       levothyroxine 75 MCG tablet    SYNTHROID    90 tablet    Take 1 tablet (75 mcg) by mouth daily    Goiter       MIRENA (52 MG) 20 MCG/24HR IUD   Generic drug:  levonorgestrel      1 each by Intrauterine route continuous        predniSONE 20 MG tablet     DELTASONE    5 tablet    Take 1 tablet (20 mg) by mouth daily    Acute tonsillitis, unspecified etiology       VITAMIN C PO      Take 500 mg by mouth daily        VITAMIN D (CHOLECALCIFEROL) PO      Take 6,000 Units by mouth daily

## 2017-09-17 LAB
BACTERIA SPEC CULT: NORMAL
SPECIMEN SOURCE: NORMAL

## 2017-10-05 ENCOUNTER — OFFICE VISIT (OUTPATIENT)
Dept: OBGYN | Facility: OTHER | Age: 37
End: 2017-10-05
Attending: OBSTETRICS & GYNECOLOGY
Payer: COMMERCIAL

## 2017-10-05 VITALS
BODY MASS INDEX: 27.94 KG/M2 | WEIGHT: 178 LBS | DIASTOLIC BLOOD PRESSURE: 71 MMHG | HEART RATE: 63 BPM | HEIGHT: 67 IN | SYSTOLIC BLOOD PRESSURE: 114 MMHG | OXYGEN SATURATION: 98 %

## 2017-10-05 DIAGNOSIS — Z30.09 FAMILY PLANNING: Primary | ICD-10-CM

## 2017-10-05 DIAGNOSIS — Z30.2 ENCOUNTER FOR STERILIZATION: ICD-10-CM

## 2017-10-05 PROCEDURE — 99214 OFFICE O/P EST MOD 30 MIN: CPT | Mod: 57 | Performed by: OBSTETRICS & GYNECOLOGY

## 2017-10-05 ASSESSMENT — PAIN SCALES - GENERAL: PAINLEVEL: NO PAIN (0)

## 2017-10-05 NOTE — MR AVS SNAPSHOT
After Visit Summary   10/5/2017    Sheridan Roldan    MRN: [id number]           Patient Information     Date Of Birth          1980        Visit Information        Provider Department      10/5/2017 11:00 AM Kalin Hernandez MD [facility]        Today's Diagnoses     Family planning    -  1    Encounter for sterilization          Care Instructions    Nothing to eat or drink after midnight prior to procedure.            Follow-ups after your visit        Your next 10 appointments already scheduled     Oct 26, 2017  2:30 PM CDT   (Arrive by 2:15 PM)   Pre-Op physical with SARAH [facility]   [facility] [place] )    [address]  [place] [address]   [phone]            Jun 06, 2018  2:00 PM CDT   (Arrive by 1:45 PM)   PHYSICAL with Chioma Wiggins MD   [facility] ([facility] [place] )    [address]  [place] [address]   [phone]              Who to contact     If you have questions or need follow up information about today's clinic visit or your schedule please contact [facility] directly at [phone].  Normal or non-critical lab and imaging results will be communicated to you by MyChart, letter or phone within 4 business days after the clinic has received the results. If you do not hear from us within 7 days, please contact the clinic through MyChart or phone. If you have a critical or abnormal lab result, we will notify you by phone as soon as possible.  Submit refill requests through Joobili or call your pharmacy and they will forward the refill request to us. Please allow 3 business days for your refill to be completed.          Additional Information About Your Visit        MyChart Information     Joobili gives you secure access to your electronic health record. If you see a primary care provider, you can also send messages to your care team and make appointments. If you  "have questions, please call your primary care clinic.  If you do not have a primary care provider, please call [phone] and they will assist you.        Care EveryWhere ID     This is your Care EveryWhere ID. This could be used by other organizations to access your [place] medical records  [id number]        Your Vitals Were     Pulse Height Pulse Oximetry BMI (Body Mass Index)          63 5' 7\" (1.702 m) 98% 27.88 kg/m2         Blood Pressure from Last 3 Encounters:   10/05/17 114/71   05/24/17 90/62   02/16/17 121/73    Weight from Last 3 Encounters:   10/05/17 178 lb (80.7 kg)   05/24/17 171 lb (77.6 kg)   01/27/17 173 lb (78.5 kg)              Today, you had the following     No orders found for display       Primary Care Provider Office Phone # Fax #    DanielleSARAH García [phone]       [facility] [address]  [place] [address]        Equal Access to Services     [facility]: Hadii aad ku hadasho Soomaali, waaxda luqadaha, qaybta kaalmada adeegyada, waxay idiin haynaten jennifer herring . So [facility] [phone].    ATENCIÓN: Si habla español, tiene a schulz disposición servicios gratuitos de asistencia lingüística. Llame al [phone].    We comply with applicable federal civil rights laws and [place] laws. We do not discriminate on the basis of race, color, national origin, age, disability, sex, sexual orientation, or gender identity.            Thank you!     Thank you for choosing [facility]  for your care. Our goal is always to provide you with excellent care. Hearing back from our patients is one way we can continue to improve our services. Please take a few minutes to complete the written survey that you may receive in the mail after your visit with us. Thank you!             Your Updated Medication List - Protect others around you: Learn how to safely use, store and throw away your medicines at www.disposemymeds.org.          This list is " accurate as of: 10/5/17 12:53 PM.  Always use your most recent med list.                   Brand Name Dispense Instructions for use Diagnosis    escitalopram 10 MG tablet    LEXAPRO    90 tablet    TAKE 1 TABLET BY MOUTH DAILY    Major depressive disorder, single episode, moderate (H)       levothyroxine 75 MCG tablet    SYNTHROID    90 tablet    Take 1 tablet (75 mcg) by mouth daily    Goiter       MIRENA (52 MG) 20 MCG/24HR IUD   Generic drug:  levonorgestrel      1 each by Intrauterine route continuous        VITAMIN C PO      Take 500 mg by mouth daily        VITAMIN D (CHOLECALCIFEROL) PO      Take 6,000 Units by mouth daily

## 2017-10-05 NOTE — PROGRESS NOTES
CC:  Consult from Dr. Wiggins for Sterilization  HPI:  Sheridan Roldan is a 36 year old female is a   P2.  No LMP recorded. Patient is not currently having periods (Reason: IUD).  Menses are rare/light on IUD.    Pt desiring permanents sterilization.  100% sure.      Current contraception Mirena IUD    Patients records are available and reviewed at today's visit.    Past GYN history: RC./cone bx       Last PAP smear:  Normal      Past Medical History:   Diagnosis Date     Acute pharyngitis 2000     Anxiety state, unspecified 3/12/2007     Breech presentation without mention of version, antepartum 1/10/2006     Galactorrhea not associated with childbirth 05/10/2005     Leukorrhea, not specified as infective 2005     Post term pregnancy, antepartum condition or complication 2006     Routine general medical examination at a health care facility 2004     Spotting complicating pregnancy, antepartum condition or complication 2008     Supervision of other normal pregnancy 2005     Threatened , antepartum 2007       Past Surgical History:   Procedure Laterality Date     ABDOMINOPLASTY       CONIZATION LEEP N/A 2016    Procedure: CONIZATION LEEP;  Surgeon: Chioma Wiggins MD;  Location: [facility]     MAMMOPLASTY AUGMENTATION BILATERAL         Family History   Problem Relation Age of Onset     Hypertension Father      Other - See Comments Father      lymphoma     Uterine Cancer Cousin        Allergies: Erythromycin and Penicillins    Current Outpatient Prescriptions   Medication Sig Dispense Refill     escitalopram (LEXAPRO) 10 MG tablet TAKE 1 TABLET BY MOUTH DAILY 90 tablet 0     levothyroxine (SYNTHROID) 75 MCG tablet Take 1 tablet (75 mcg) by mouth daily 90 tablet 4     Ascorbic Acid (VITAMIN C PO) Take 500 mg by mouth daily       levonorgestrel (MIRENA) 20 MCG/24HR IUD 1 each by Intrauterine route continuous        VITAMIN D, CHOLECALCIFEROL, PO Take 6,000 Units by  "mouth daily         ROS:  C: NEGATIVE for fever, chills, change in weight  GI: NEGATIVE for nausea, abdominal pain, heartburn, or change in bowel habits  : NEGATIVE for frequency, dysuria, hematuria, vaginal discharge      EXAM:  Blood pressure 114/71, pulse 63, height 5' 7\" (1.702 m), weight 178 lb (80.7 kg), SpO2 98 %.   BMI= Body mass index is 27.88 kg/(m^2).  General - pleasant female in no acute distress.  Musculoskeletal - no gross deformities.  Neurological - normal mental status.  Extremities:  No edema      ASSESSMENT/PLAN:  Desires Sterilization  Contraceptive alternatives discussed.  Reviewed goals, risks, alternatives for tubal occlusion procedure including risk of anesthesia, bleeding, infection, damage to nerves, blood vessels, bowel and bladder. Discussed irreversibility of procedure, 1% failure rate, tubal pregnancy, menstrual changes and regret. Discussed surgical options including salpingectomy.   Discussed recovery period and expected discomfort.. All questions were answered. Preoperative instructions discussed.  NPO after midnight. Laparoscopic BTO scheduled 11/55/17.  Handouts on procedure given to patient.   Pt would like to keep IUD in place for back-up contraception and to help with periods.   [employer] insurance.  Preop to be scheduled. 25 minutes were spent with the patient with greater than 50% of the visit spent in face-to-face counseling and coordination of care.          Kalin Hernandez MD  "

## 2017-10-05 NOTE — NURSING NOTE
"Chief Complaint   Patient presents with     Consult     Wiggins/ Tubal       Initial /71 (BP Location: Left arm, Cuff Size: Adult Regular)  Pulse 63  Ht 5' 7\" (1.702 m)  Wt 178 lb (80.7 kg)  SpO2 98%  BMI 27.88 kg/m2 Estimated body mass index is 27.88 kg/(m^2) as calculated from the following:    Height as of this encounter: 5' 7\" (1.702 m).    Weight as of this encounter: 178 lb (80.7 kg).  Medication Reconciliation: complete     Ruthy Smith      "

## 2017-10-11 DIAGNOSIS — Z30.2 ENCOUNTER FOR STERILIZATION: Primary | ICD-10-CM

## 2017-10-20 ENCOUNTER — TELEPHONE (OUTPATIENT)
Dept: OBGYN | Facility: OTHER | Age: 37
End: 2017-10-20

## 2017-10-20 NOTE — TELEPHONE ENCOUNTER
Pt would like to change her Lap BTO on 11/22/17 to Bilateral Salpingectomy. How long will she be off of work. Please advise

## 2017-10-26 ENCOUNTER — OFFICE VISIT (OUTPATIENT)
Dept: FAMILY MEDICINE | Facility: OTHER | Age: 37
End: 2017-10-26
Attending: PHYSICIAN ASSISTANT
Payer: COMMERCIAL

## 2017-10-26 VITALS
SYSTOLIC BLOOD PRESSURE: 116 MMHG | OXYGEN SATURATION: 98 % | HEART RATE: 72 BPM | DIASTOLIC BLOOD PRESSURE: 66 MMHG | BODY MASS INDEX: 28.28 KG/M2 | HEIGHT: 66 IN | TEMPERATURE: 98 F | WEIGHT: 176 LBS

## 2017-10-26 DIAGNOSIS — Z01.818 PREOP GENERAL PHYSICAL EXAM: Primary | ICD-10-CM

## 2017-10-26 PROCEDURE — 99214 OFFICE O/P EST MOD 30 MIN: CPT | Performed by: PHYSICIAN ASSISTANT

## 2017-10-26 ASSESSMENT — ANXIETY QUESTIONNAIRES
1. FEELING NERVOUS, ANXIOUS, OR ON EDGE: NOT AT ALL
4. TROUBLE RELAXING: NOT AT ALL
GAD7 TOTAL SCORE: 0
7. FEELING AFRAID AS IF SOMETHING AWFUL MIGHT HAPPEN: NOT AT ALL
IF YOU CHECKED OFF ANY PROBLEMS ON THIS QUESTIONNAIRE, HOW DIFFICULT HAVE THESE PROBLEMS MADE IT FOR YOU TO DO YOUR WORK, TAKE CARE OF THINGS AT HOME, OR GET ALONG WITH OTHER PEOPLE: NOT DIFFICULT AT ALL
3. WORRYING TOO MUCH ABOUT DIFFERENT THINGS: NOT AT ALL
6. BECOMING EASILY ANNOYED OR IRRITABLE: NOT AT ALL
5. BEING SO RESTLESS THAT IT IS HARD TO SIT STILL: NOT AT ALL
2. NOT BEING ABLE TO STOP OR CONTROL WORRYING: NOT AT ALL

## 2017-10-26 ASSESSMENT — PATIENT HEALTH QUESTIONNAIRE - PHQ9: SUM OF ALL RESPONSES TO PHQ QUESTIONS 1-9: 1

## 2017-10-26 ASSESSMENT — PAIN SCALES - GENERAL: PAINLEVEL: NO PAIN (0)

## 2017-10-26 NOTE — NURSING NOTE
"Chief Complaint   Patient presents with     Pre-Op Exam     for Dr. Hernandez at [facility] on 11/15/17 for LAPAROSCOPIC BILATERAL TUBAL OCCLUSION       Initial /66 (BP Location: Left arm, Patient Position: Chair, Cuff Size: Adult Large)  Pulse 72  Temp 98  F (36.7  C) (Tympanic)  Ht 5' 6.25\" (1.683 m)  Wt 176 lb (79.8 kg)  SpO2 98%  Breastfeeding? No  BMI 28.19 kg/m2 Estimated body mass index is 28.19 kg/(m^2) as calculated from the following:    Height as of this encounter: 5' 6.25\" (1.683 m).    Weight as of this encounter: 176 lb (79.8 kg).  Medication Reconciliation: complete   Shirlene Valdez CMA(AAMA)   "

## 2017-10-26 NOTE — MR AVS SNAPSHOT
After Visit Summary   10/26/2017    Sheridan Roldan    MRN: [id number]           Patient Information     Date Of Birth          1980        Visit Information        Provider Department      10/26/2017 2:30 PM Danielle Waller PA [facility]        Today's Diagnoses     Preop general physical exam    -  1      Care Instructions      Before Your Surgery      Call your surgeon if there is any change in your health. This includes signs of a cold or flu (such as a sore throat, runny nose, cough, rash or fever).    Do not smoke, drink alcohol or take over the counter medicine (unless your surgeon or primary care doctor tells you to) for the 24 hours before and after surgery.    If you take prescribed drugs: Follow your doctor s orders about which medicines to take and which to stop until after surgery.    Eating and drinking prior to surgery: follow the instructions from your surgeon    Take a shower or bath the night before surgery. Use the soap your surgeon gave you to gently clean your skin. If you do not have soap from your surgeon, use your regular soap. Do not shave or scrub the surgery site.  Wear clean pajamas and have clean sheets on your bed.           Follow-ups after your visit        Your next 10 appointments already scheduled     Nov 22, 2017   Procedure with Kalin Hernandez MD   [facility] ([facility] )    [address] [place] [address]   [phone]            Jun 06, 2018  2:00 PM CDT   (Arrive by 1:45 PM)   PHYSICAL with Chioma Wiggins MD   [facility] ([facility] )    [address] [facility] [address]   [phone]              Future tests that were ordered for you today     Open Future Orders        Priority Expected Expires Ordered    HCG qualitative urine - CSC and Range Routine  10/26/2018 10/26/2017            Who to contact     If you have questions or need follow up information about  "today's clinic visit or your schedule please contact [facility] directly at [phone].  Normal or non-critical lab and imaging results will be communicated to you by MyChart, letter or phone within 4 business days after the clinic has received the results. If you do not hear from us within 7 days, please contact the clinic through "[employer]hart or phone. If you have a critical or abnormal lab result, we will notify you by phone as soon as possible.  Submit refill requests through [employer] or call your pharmacy and they will forward the refill request to us. Please allow 3 business days for your refill to be completed.          Additional Information About Your Visit        "[employer] Information     [employer] gives you secure access to your electronic health record. If you see a primary care provider, you can also send messages to your care team and make appointments. If you have questions, please call your primary care clinic.  If you do not have a primary care provider, please call [phone] and they will assist you.        Care EveryWhere ID     This is your Care EveryWhere ID. This could be used by other organizations to access your [facility] medical records  [id number]        Your Vitals Were     Pulse Temperature Height Pulse Oximetry Breastfeeding? BMI (Body Mass Index)    72 98  F (36.7  C) (Tympanic) 5' 6.25\" (1.683 m) 98% No 28.19 kg/m2       Blood Pressure from Last 3 Encounters:   10/26/17 116/66   10/05/17 114/71   05/24/17 90/62    Weight from Last 3 Encounters:   10/26/17 176 lb (79.8 kg)   10/05/17 178 lb (80.7 kg)   05/24/17 171 lb (77.6 kg)               Primary Care Provider Office Phone # Fax #    SARAH Bond [phone]       [facility] [address] [place] [address]        Equal Access to Services     AMBROSIO ROBERTSON AH: Hadii aad ku hadasho Soomaali, waaxda luqadaha, qaybta kaalmada adeegyada, lizz gay. So [facility] " [phone].    ATENCIÓN: Si danitza diaz, tiene a schulz disposición servicios gratuitos de asistencia lingüística. Mathieu orr [phone].    We comply with applicable federal civil rights laws and [place] laws. We do not discriminate on the basis of race, color, national origin, age, disability, sex, sexual orientation, or gender identity.            Thank you!     Thank you for choosing [facility] [place]  for your care. Our goal is always to provide you with excellent care. Hearing back from our patients is one way we can continue to improve our services. Please take a few minutes to complete the written survey that you may receive in the mail after your visit with us. Thank you!             Your Updated Medication List - Protect others around you: Learn how to safely use, store and throw away your medicines at www.disposemymeds.org.          This list is accurate as of: 10/26/17  3:08 PM.  Always use your most recent med list.                   Brand Name Dispense Instructions for use Diagnosis    escitalopram 10 MG tablet    LEXAPRO    90 tablet    TAKE 1 TABLET BY MOUTH DAILY    Major depressive disorder, single episode, moderate (H)       levothyroxine 75 MCG tablet    SYNTHROID    90 tablet    Take 1 tablet (75 mcg) by mouth daily    Goiter       MIRENA (52 MG) 20 MCG/24HR IUD   Generic drug:  levonorgestrel      1 each by Intrauterine route continuous        VITAMIN C PO      Take 500 mg by mouth daily        VITAMIN D (CHOLECALCIFEROL) PO      Take 6,000 Units by mouth daily

## 2017-10-26 NOTE — PROGRESS NOTES
[facility]  [address]  [place] [address]  [phone]  Dept: [phone]    PRE-OP EVALUATION:  Today's date: 10/26/2017    Sheridan Roldan (: 1980) presents for pre-operative evaluation assessment as requested by Dr. Hudson .  She requires evaluation and anesthesia risk assessment prior to undergoing surgery/procedure for treatment of LAPAROSCOPIC BILATERAL TUBAL OCCLUSION .  Proposed procedure: LAPAROSCOPIC BILATERAL TUBAL OCCLUSION    Date of Surgery/ Procedure: 11/15/17  [facility]   [place] [facility]   [place] Physician: Danielle Waller  Type of Anesthesia Anticipated: to be determined    Patient has a Health Care Directive or Living Will:  NO    1. NO - Do you have a history of heart attack, stroke, stent, bypass or surgery on an artery in the head, neck, heart or legs?  2. NO - Do you ever have any pain or discomfort in your chest?  3. NO - Do you have a history of  Heart Failure?  4. NO - Are you troubled by shortness of breath when: walking on the level, up a slight hill or at night?  5. NO - Do you currently have a cold, bronchitis or other respiratory infection?  6. NO - Do you have a cough, shortness of breath or wheezing?  7. NO - Do you sometimes get pains in the calves of your legs when you walk?  8. NO - Do you or anyone in your family have previous history of blood clots?  9. NO - Do you or does anyone in your family have a serious bleeding problem such as prolonged bleeding following surgeries or cuts?  10. NO - Have you ever had problems with anemia or been told to take iron pills?  11. NO - Have you had any abnormal blood loss such as black, tarry or bloody stools, or abnormal vaginal bleeding?  12. NO - Have you ever had a blood transfusion?  13. NO - Have you or any of your relatives ever had problems with anesthesia?  14. NO - Do you have sleep apnea, excessive snoring or daytime drowsiness?  15. NO - Do you have any  prosthetic heart valves?  16. NO - Do you have prosthetic joints?  17. NO - Is there any chance that you may be pregnant?        HPI:                                                      Brief HPI related to upcoming procedure:       See problem list for active medical problems.  Problems all longstanding and stable, except as noted/documented.  See ROS for pertinent symptoms related to these conditions.                                                                                                  .    MEDICAL HISTORY:                                                    Patient Active Problem List    Diagnosis Date Noted     Papanicolaou smear of cervix with atypical squamous cells of undetermined significance (ASC-US) 05/31/2017     Priority: Medium     Neg hr hpv       NO SHOW 09/13/2016     Priority: Medium     No showed Dr. Wiggins 9/13/16       RC II (cervical intraepithelial neoplasia II) 06/13/2016     Priority: Medium     LEEP=cervicitis only       High risk HPV infection 05/24/2016     Priority: Medium     other       Papanicolaou smear of cervix with low grade squamous intraepithelial lesion (LGSIL) 05/23/2016     Priority: Medium     Chronic tonsillitis 04/15/2015     Priority: Medium     Tonsillar hypertrophy 04/15/2015     Priority: Medium     Encounter for routine gynecological examination 01/08/2014     Priority: Medium     Problem list name updated by automated process. Provider to review       Fatigue 01/08/2014     Priority: Medium     Mastodynia 01/08/2014     Priority: Medium     Goiter 01/08/2014     Priority: Medium     Family planning 05/15/2013     Priority: Medium     MIrena due for change in 2018        Past Medical History:   Diagnosis Date     Acute pharyngitis 7/26/2000     Anxiety state, unspecified 3/12/2007     Breech presentation without mention of version, antepartum 1/10/2006     Galactorrhea not associated with childbirth 05/10/2005     Leukorrhea, not specified as infective  2005     Post term pregnancy, antepartum condition or complication 2006     Routine general medical examination at a health care facility 2004     Spotting complicating pregnancy, antepartum condition or complication 2008     Supervision of other normal pregnancy 2005     Threatened , antepartum 2007     Past Surgical History:   Procedure Laterality Date     ABDOMINOPLASTY       CONIZATION LEEP N/A 2016    Procedure: CONIZATION LEEP;  Surgeon: Chioma Wiggins MD;  Location: [facility]     MAMMOPLASTY AUGMENTATION BILATERAL       Current Outpatient Prescriptions   Medication Sig Dispense Refill     escitalopram (LEXAPRO) 10 MG tablet TAKE 1 TABLET BY MOUTH DAILY 90 tablet 0     levothyroxine (SYNTHROID) 75 MCG tablet Take 1 tablet (75 mcg) by mouth daily 90 tablet 4     VITAMIN D, CHOLECALCIFEROL, PO Take 6,000 Units by mouth daily       Ascorbic Acid (VITAMIN C PO) Take 500 mg by mouth daily       levonorgestrel (MIRENA) 20 MCG/24HR IUD 1 each by Intrauterine route continuous        OTC products: None, except as noted above    Allergies   Allergen Reactions     Erythromycin Nausea and Vomiting     Erythromycin base     Penicillins Rash      Latex Allergy: NO    Social History   Substance Use Topics     Smoking status: Former Smoker     Quit date: 2002     Smokeless tobacco: Never Used     Alcohol use Yes      Comment: socially     History   Drug Use No       REVIEW OF SYSTEMS:                                                    C: NEGATIVE for fever, chills, change in weight  I: NEGATIVE for worrisome rashes, moles or lesions  E: NEGATIVE for vision changes or irritation  E/M: NEGATIVE for ear, mouth and throat problems  R: NEGATIVE for significant cough or SOB  B: NEGATIVE for masses, tenderness or discharge  CV: NEGATIVE for chest pain, palpitations or peripheral edema  GI: NEGATIVE for nausea, abdominal pain, heartburn, or change in bowel habits  : NEGATIVE for  "frequency, dysuria, or hematuria  M: NEGATIVE for significant arthralgias or myalgia  N: NEGATIVE for weakness, dizziness or paresthesias  E: NEGATIVE for temperature intolerance, skin/hair changes  H: NEGATIVE for bleeding problems  P: NEGATIVE for changes in mood or affect    EXAM:                                                    /66 (BP Location: Left arm, Patient Position: Chair, Cuff Size: Adult Large)  Pulse 72  Temp 98  F (36.7  C) (Tympanic)  Ht 5' 6.25\" (1.683 m)  Wt 176 lb (79.8 kg)  SpO2 98%  Breastfeeding? No  BMI 28.19 kg/m2    GENERAL APPEARANCE: healthy, alert and no distress     EYES: EOMI, PERRL     HENT: ear canals and TM's normal and nose and mouth without ulcers or lesions     NECK: no adenopathy, no asymmetry, masses, or scars and thyroid normal to palpation     RESP: lungs clear to auscultation - no rales, rhonchi or wheezes     CV: regular rates and rhythm, normal S1 S2, no S3 or S4 and no murmur, click or rub     ABDOMEN:  soft, nontender, no HSM or masses and bowel sounds normal     MS: extremities normal- no gross deformities noted, no evidence of inflammation in joints, FROM in all extremities.     SKIN: no suspicious lesions or rashes     NEURO: Normal strength and tone, sensory exam grossly normal, mentation intact and speech normal     PSYCH: mentation appears normal. and affect normal/bright     LYMPHATICS: No axillary, cervical, or supraclavicular nodes    DIAGNOSTICS:                                                    Will have urine HCG a week before surgery.  Does have IUD in place.     Recent Labs   Lab Test  05/26/17   0814  05/16/16   1045   04/17/15   1127  02/06/15   0833   HGB  14.0  14.5   < >  14.2  14.9   PLT  194  170   < >  175  202   INR   --    --    --   1.07   --    NA   --   139   --    --    --    POTASSIUM   --   3.9   --    --    --    CR   --   0.70   --    --    --    A1C  4.9   --    --    --   5.0    < > = values in this interval not displayed. "        IMPRESSION:                                                    Reason for surgery/procedure: tubal removal for sterilization.   Diagnosis/reason for consult: medical clearance.     The proposed surgical procedure is considered LOW risk.    REVISED CARDIAC RISK INDEX  The patient has the following serious cardiovascular risks for perioperative complications such as (MI, PE, VFib and 3  AV Block):  No serious cardiac risks  INTERPRETATION: 1 risks: Class II (low risk - 0.9% complication rate)    The patient has the following additional risks for perioperative complications:  No identified additional risks      ICD-10-CM    1. Preop general physical exam Z01.818 HCG qualitative urine - CSC and Range       RECOMMENDATIONS:                                                          --Patient is to take all scheduled medications on the day of surgery EXCEPT for modifications listed below.    APPROVAL GIVEN to proceed with proposed procedure, without further diagnostic evaluation       Signed Electronically by: SARAH Brennan    Copy of this evaluation report is provided to requesting physician.    [facility] Preop Guidelines

## 2017-10-27 ASSESSMENT — ANXIETY QUESTIONNAIRES: GAD7 TOTAL SCORE: 0

## 2017-10-31 ENCOUNTER — OFFICE VISIT (OUTPATIENT)
Dept: OBGYN | Facility: OTHER | Age: 37
End: 2017-10-31
Attending: NURSE PRACTITIONER
Payer: COMMERCIAL

## 2017-10-31 VITALS
OXYGEN SATURATION: 98 % | WEIGHT: 176 LBS | SYSTOLIC BLOOD PRESSURE: 114 MMHG | BODY MASS INDEX: 28.28 KG/M2 | DIASTOLIC BLOOD PRESSURE: 76 MMHG | HEIGHT: 66 IN | HEART RATE: 63 BPM

## 2017-10-31 DIAGNOSIS — S30.824A BLISTER (NONTHERMAL) OF VAGINA AND VULVA, INITIAL ENCOUNTER: Primary | ICD-10-CM

## 2017-10-31 LAB
HSV1 IGG SERPL QL IA: NORMAL AI (ref 0–0.8)
HSV2 IGG SERPL QL IA: NORMAL AI (ref 0–0.8)

## 2017-10-31 PROCEDURE — 87529 HSV DNA AMP PROBE: CPT | Mod: 90 | Performed by: NURSE PRACTITIONER

## 2017-10-31 PROCEDURE — 99213 OFFICE O/P EST LOW 20 MIN: CPT | Performed by: NURSE PRACTITIONER

## 2017-10-31 PROCEDURE — 99000 SPECIMEN HANDLING OFFICE-LAB: CPT | Performed by: NURSE PRACTITIONER

## 2017-10-31 PROCEDURE — 36415 COLL VENOUS BLD VENIPUNCTURE: CPT | Performed by: NURSE PRACTITIONER

## 2017-10-31 RX ORDER — VALACYCLOVIR HYDROCHLORIDE 1 G/1
1000 TABLET, FILM COATED ORAL 2 TIMES DAILY
Qty: 20 TABLET | Refills: 0 | Status: SHIPPED | OUTPATIENT
Start: 2017-10-31 | End: 2017-11-02

## 2017-10-31 ASSESSMENT — PAIN SCALES - GENERAL: PAINLEVEL: NO PAIN (0)

## 2017-10-31 NOTE — MR AVS SNAPSHOT
After Visit Summary   10/31/2017    Sheridan Roldan    MRN: [id number]           Patient Information     Date Of Birth          1980        Visit Information        Provider Department      10/31/2017 10:45 AM Anne-Marie Cain NP [facility]        Today's Diagnoses     Blister (nonthermal) of vagina and vulva, initial encounter    -  1      Care Instructions    Follow up as needed          Follow-ups after your visit        Your next 10 appointments already scheduled     Oct 31, 2017 10:45 AM CDT   (Arrive by 10:30 AM)   SHORT with Anne-Marie Cain NP   [facility] ([facility] )    [address] [facility] [address]   [phone]            Nov 22, 2017   Procedure with Kalin Hernandez MD   [facility] ([facility] )    [address] [place] [id number]   [phone]            Jun 06, 2018  2:00 PM CDT   (Arrive by 1:45 PM)   PHYSICAL with Chioma Wiggins MD   [facility] ([facility] )    [address] [facility] [address]   [phone]              Who to contact     If you have questions or need follow up information about today's clinic visit or your schedule please contact [facility] directly at [phone].  Normal or non-critical lab and imaging results will be communicated to you by MyChart, letter or phone within 4 business days after the clinic has received the results. If you do not hear from us within 7 days, please contact the clinic through MyChart or phone. If you have a critical or abnormal lab result, we will notify you by phone as soon as possible.  Submit refill requests through [employer] or call your pharmacy and they will forward the refill request to us. Please allow 3 business days for your refill to be completed.          Additional Information About Your Visit        [employer] Information     [employer] gives you secure access to your electronic health  "record. If you see a primary care provider, you can also send messages to your care team and make appointments. If you have questions, please call your primary care clinic.  If you do not have a primary care provider, please call [phone] and they will assist you.        Care EveryWhere ID     This is your Care EveryWhere ID. This could be used by other organizations to access your [facility] medical records  [id number]        Your Vitals Were     Pulse Height Pulse Oximetry BMI (Body Mass Index)          63 5' 6.25\" (1.683 m) 98% 28.19 kg/m2         Blood Pressure from Last 3 Encounters:   10/31/17 114/76   10/26/17 116/66   10/05/17 114/71    Weight from Last 3 Encounters:   10/31/17 176 lb (79.8 kg)   10/26/17 176 lb (79.8 kg)   10/05/17 178 lb (80.7 kg)              We Performed the Following     Herpes Simplex Virus 1 and 2 IgG          Today's Medication Changes          These changes are accurate as of: 10/31/17 10:36 AM.  If you have any questions, ask your nurse or doctor.               Start taking these medicines.        Dose/Directions    valACYclovir 1000 mg tablet   Commonly known as:  VALTREX   Used for:  Blister (nonthermal) of vagina and vulva, initial encounter   Started by:  Anne-Marie Cain, ALEX        Dose:  1000 mg   Take 1 tablet (1,000 mg) by mouth 2 times daily   Quantity:  20 tablet   Refills:  0            Where to get your medicines      These medications were sent to [facility] - [address]  [address] [place] [address]     Phone:  [phone]     valACYclovir 1000 mg tablet                Primary Care Provider Office Phone # Fax #    SARAH Bond [phone]       [facility] [address]  [place] [address]        Equal Access to Services     AMBROSIO ROBERTSON AH: Jimenez francoo Soomaali, waaxda luqadaha, qaybta kaalmada adeegpriscilla, lizz gay. So [facility] " [phone].    ATENCIÓN: Si danitza diaz, tiene a schulz disposición servicios gratuitos de asistencia lingüística. Mathieu orr [phone].    We comply with applicable federal civil rights laws and [place] laws. We do not discriminate on the basis of race, color, national origin, age, disability, sex, sexual orientation, or gender identity.            Thank you!     Thank you for choosing [facility]  for your care. Our goal is always to provide you with excellent care. Hearing back from our patients is one way we can continue to improve our services. Please take a few minutes to complete the written survey that you may receive in the mail after your visit with us. Thank you!             Your Updated Medication List - Protect others around you: Learn how to safely use, store and throw away your medicines at www.disposemymeds.org.          This list is accurate as of: 10/31/17 10:36 AM.  Always use your most recent med list.                   Brand Name Dispense Instructions for use Diagnosis    escitalopram 10 MG tablet    LEXAPRO    90 tablet    TAKE 1 TABLET BY MOUTH DAILY    Major depressive disorder, single episode, moderate (H)       levothyroxine 75 MCG tablet    SYNTHROID    90 tablet    Take 1 tablet (75 mcg) by mouth daily    Goiter       MIRENA (52 MG) 20 MCG/24HR IUD   Generic drug:  levonorgestrel      1 each by Intrauterine route continuous        valACYclovir 1000 mg tablet    VALTREX    20 tablet    Take 1 tablet (1,000 mg) by mouth 2 times daily    Blister (nonthermal) of vagina and vulva, initial encounter       VITAMIN C PO      Take 500 mg by mouth daily        VITAMIN D (CHOLECALCIFEROL) PO      Take 6,000 Units by mouth daily

## 2017-10-31 NOTE — PROGRESS NOTES
"[facility]                HPI   Presenting with concerns of painful sores in her francis area.  She did not feel well over the past 3 days.  She noticed the sores last night and discovered another this morning.  Denies nay known exposures.              Medications:     Current Outpatient Prescriptions Ordered in Epic   Medication     valACYclovir (VALTREX) 1000 mg tablet     escitalopram (LEXAPRO) 10 MG tablet     levothyroxine (SYNTHROID) 75 MCG tablet     VITAMIN D, CHOLECALCIFEROL, PO     Ascorbic Acid (VITAMIN C PO)     levonorgestrel (MIRENA) 20 MCG/24HR IUD     No current Epic-ordered facility-administered medications on file.                 Allergies:   Erythromycin and Penicillins         Review of Systems:   The 5 point Review of Systems is negative other than noted in the HPI                     Physical Exam:   Blood pressure 114/76, pulse 63, height 5' 6.25\" (1.683 m), weight 176 lb (79.8 kg), SpO2 98 %, not currently breastfeeding.  Constitutional:   awake, alert, cooperative, no apparent distress, and appears stated age   Gyn: external genitalia shaved.  Perineal area with 4 concave blisters and erythema.  Culture obtained.     Viral culture pending            Assessment and Plan:   Perineal blisters - probable initial  HSV outbreak.  Culture pending.  Will treat with valtrex until verified.  Encouraged to avoid contact with partner until healed or ruled out.       KIMBERLYN Barbour  10/31/2017  10:21 AM  "

## 2017-10-31 NOTE — NURSING NOTE
"Chief Complaint   Patient presents with     Vaginal Problem     Bump        Initial /76  Pulse 63  Ht 5' 6.25\" (1.683 m)  Wt 176 lb (79.8 kg)  SpO2 98%  BMI 28.19 kg/m2 Estimated body mass index is 28.19 kg/(m^2) as calculated from the following:    Height as of this encounter: 5' 6.25\" (1.683 m).    Weight as of this encounter: 176 lb (79.8 kg).  Medication Reconciliation: complete     Rima Maxwell      "

## 2017-11-01 LAB
HSV1 DNA SPEC QL NAA+PROBE: POSITIVE
HSV2 DNA SPEC QL NAA+PROBE: NEGATIVE
SPECIMEN SOURCE: ABNORMAL

## 2017-11-02 RX ORDER — VALACYCLOVIR HYDROCHLORIDE 1 G/1
1000 TABLET, FILM COATED ORAL 2 TIMES DAILY
Qty: 20 TABLET | Refills: 0 | Status: SHIPPED | OUTPATIENT
Start: 2017-11-02 | End: 2018-06-06

## 2017-11-03 DIAGNOSIS — Z30.2 ENCOUNTER FOR STERILIZATION: Primary | ICD-10-CM

## 2017-11-10 NOTE — H&P (VIEW-ONLY)
[facility] [place]  [address]  [place] [address]  [phone]  Dept: [phone]    PRE-OP EVALUATION:  Today's date: 10/26/2017    Sheridan Roldan (: 1980) presents for pre-operative evaluation assessment as requested by Dr. Hudson .  She requires evaluation and anesthesia risk assessment prior to undergoing surgery/procedure for treatment of LAPAROSCOPIC BILATERAL TUBAL OCCLUSION .  Proposed procedure: LAPAROSCOPIC BILATERAL TUBAL OCCLUSION    Date of Surgery/ Procedure: 11/15/17  Time of Surgery/ Procedure: [facility]   [place] [facility] [place] Physician: Danielle Waller  Type of Anesthesia Anticipated: to be determined    Patient has a Health Care Directive or Living Will:  NO    1. NO - Do you have a history of heart attack, stroke, stent, bypass or surgery on an artery in the head, neck, heart or legs?  2. NO - Do you ever have any pain or discomfort in your chest?  3. NO - Do you have a history of  Heart Failure?  4. NO - Are you troubled by shortness of breath when: walking on the level, up a slight hill or at night?  5. NO - Do you currently have a cold, bronchitis or other respiratory infection?  6. NO - Do you have a cough, shortness of breath or wheezing?  7. NO - Do you sometimes get pains in the calves of your legs when you walk?  8. NO - Do you or anyone in your family have previous history of blood clots?  9. NO - Do you or does anyone in your family have a serious bleeding problem such as prolonged bleeding following surgeries or cuts?  10. NO - Have you ever had problems with anemia or been told to take iron pills?  11. NO - Have you had any abnormal blood loss such as black, tarry or bloody stools, or abnormal vaginal bleeding?  12. NO - Have you ever had a blood transfusion?  13. NO - Have you or any of your relatives ever had problems with anesthesia?  14. NO - Do you have sleep apnea, excessive snoring or daytime drowsiness?  15. NO - Do you have any  prosthetic heart valves?  16. NO - Do you have prosthetic joints?  17. NO - Is there any chance that you may be pregnant?        HPI:                                                      Brief HPI related to upcoming procedure:       See problem list for active medical problems.  Problems all longstanding and stable, except as noted/documented.  See ROS for pertinent symptoms related to these conditions.                                                                                                  .    MEDICAL HISTORY:                                                    Patient Active Problem List    Diagnosis Date Noted     Papanicolaou smear of cervix with atypical squamous cells of undetermined significance (ASC-US) 05/31/2017     Priority: Medium     Neg hr hpv       NO SHOW 09/13/2016     Priority: Medium     No showed Dr. Wiggins 9/13/16       RC II (cervical intraepithelial neoplasia II) 06/13/2016     Priority: Medium     LEEP=cervicitis only       High risk HPV infection 05/24/2016     Priority: Medium     other       Papanicolaou smear of cervix with low grade squamous intraepithelial lesion (LGSIL) 05/23/2016     Priority: Medium     Chronic tonsillitis 04/15/2015     Priority: Medium     Tonsillar hypertrophy 04/15/2015     Priority: Medium     Encounter for routine gynecological examination 01/08/2014     Priority: Medium     Problem list name updated by automated process. Provider to review       Fatigue 01/08/2014     Priority: Medium     Mastodynia 01/08/2014     Priority: Medium     Goiter 01/08/2014     Priority: Medium     Family planning 05/15/2013     Priority: Medium     MIrena due for change in 2018        Past Medical History:   Diagnosis Date     Acute pharyngitis 7/26/2000     Anxiety state, unspecified 3/12/2007     Breech presentation without mention of version, antepartum 1/10/2006     Galactorrhea not associated with childbirth 05/10/2005     Leukorrhea, not specified as infective  2005     Post term pregnancy, antepartum condition or complication 2006     Routine general medical examination at a health care facility 2004     Spotting complicating pregnancy, antepartum condition or complication 2008     Supervision of other normal pregnancy 2005     Threatened , antepartum 2007     Past Surgical History:   Procedure Laterality Date     ABDOMINOPLASTY       CONIZATION LEEP N/A 2016    Procedure: CONIZATION LEEP;  Surgeon: Chioma Wiggins MD;  Location: [facility] OR     MAMMOPLASTY AUGMENTATION BILATERAL       Current Outpatient Prescriptions   Medication Sig Dispense Refill     escitalopram (LEXAPRO) 10 MG tablet TAKE 1 TABLET BY MOUTH DAILY 90 tablet 0     levothyroxine (SYNTHROID) 75 MCG tablet Take 1 tablet (75 mcg) by mouth daily 90 tablet 4     VITAMIN D, CHOLECALCIFEROL, PO Take 6,000 Units by mouth daily       Ascorbic Acid (VITAMIN C PO) Take 500 mg by mouth daily       levonorgestrel (MIRENA) 20 MCG/24HR IUD 1 each by Intrauterine route continuous        OTC products: None, except as noted above    Allergies   Allergen Reactions     Erythromycin Nausea and Vomiting     Erythromycin base     Penicillins Rash      Latex Allergy: NO    Social History   Substance Use Topics     Smoking status: Former Smoker     Quit date: 2002     Smokeless tobacco: Never Used     Alcohol use Yes      Comment: socially     History   Drug Use No       REVIEW OF SYSTEMS:                                                    C: NEGATIVE for fever, chills, change in weight  I: NEGATIVE for worrisome rashes, moles or lesions  E: NEGATIVE for vision changes or irritation  E/M: NEGATIVE for ear, mouth and throat problems  R: NEGATIVE for significant cough or SOB  B: NEGATIVE for masses, tenderness or discharge  CV: NEGATIVE for chest pain, palpitations or peripheral edema  GI: NEGATIVE for nausea, abdominal pain, heartburn, or change in bowel habits  : NEGATIVE for  "frequency, dysuria, or hematuria  M: NEGATIVE for significant arthralgias or myalgia  N: NEGATIVE for weakness, dizziness or paresthesias  E: NEGATIVE for temperature intolerance, skin/hair changes  H: NEGATIVE for bleeding problems  P: NEGATIVE for changes in mood or affect    EXAM:                                                    /66 (BP Location: Left arm, Patient Position: Chair, Cuff Size: Adult Large)  Pulse 72  Temp 98  F (36.7  C) (Tympanic)  Ht 5' 6.25\" (1.683 m)  Wt 176 lb (79.8 kg)  SpO2 98%  Breastfeeding? No  BMI 28.19 kg/m2    GENERAL APPEARANCE: healthy, alert and no distress     EYES: EOMI, PERRL     HENT: ear canals and TM's normal and nose and mouth without ulcers or lesions     NECK: no adenopathy, no asymmetry, masses, or scars and thyroid normal to palpation     RESP: lungs clear to auscultation - no rales, rhonchi or wheezes     CV: regular rates and rhythm, normal S1 S2, no S3 or S4 and no murmur, click or rub     ABDOMEN:  soft, nontender, no HSM or masses and bowel sounds normal     MS: extremities normal- no gross deformities noted, no evidence of inflammation in joints, FROM in all extremities.     SKIN: no suspicious lesions or rashes     NEURO: Normal strength and tone, sensory exam grossly normal, mentation intact and speech normal     PSYCH: mentation appears normal. and affect normal/bright     LYMPHATICS: No axillary, cervical, or supraclavicular nodes    DIAGNOSTICS:                                                    Will have urine HCG a week before surgery.  Does have IUD in place.     Recent Labs   Lab Test  05/26/17   0814  05/16/16   1045   04/17/15   1127  02/06/15   0833   HGB  14.0  14.5   < >  14.2  14.9   PLT  194  170   < >  175  202   INR   --    --    --   1.07   --    NA   --   139   --    --    --    POTASSIUM   --   3.9   --    --    --    CR   --   0.70   --    --    --    A1C  4.9   --    --    --   5.0    < > = values in this interval not displayed. "        IMPRESSION:                                                    Reason for surgery/procedure: tubal removal for sterilization.   Diagnosis/reason for consult: medical clearance.     The proposed surgical procedure is considered LOW risk.    REVISED CARDIAC RISK INDEX  The patient has the following serious cardiovascular risks for perioperative complications such as (MI, PE, VFib and 3  AV Block):  No serious cardiac risks  INTERPRETATION: 1 risks: Class II (low risk - 0.9% complication rate)    The patient has the following additional risks for perioperative complications:  No identified additional risks      ICD-10-CM    1. Preop general physical exam Z01.818 HCG qualitative urine - [facility]       RECOMMENDATIONS:                                                          --Patient is to take all scheduled medications on the day of surgery EXCEPT for modifications listed below.    APPROVAL GIVEN to proceed with proposed procedure, without further diagnostic evaluation       Signed Electronically by: SARAH Brennan    Copy of this evaluation report is provided to requesting physician.    [facility] Preop Guidelines   electronic

## 2017-11-14 ENCOUNTER — TELEPHONE (OUTPATIENT)
Dept: OBGYN | Facility: OTHER | Age: 37
End: 2017-11-14

## 2017-11-14 NOTE — TELEPHONE ENCOUNTER
Pt is having bilateral salpingectomy on 11/22/17. Off work from 11/22/17 until 11/30/17. Wants to know if can also have 12/1/17 and return to work 12/4/17. Please advise

## 2017-11-21 ENCOUNTER — TELEPHONE (OUTPATIENT)
Dept: OBGYN | Facility: OTHER | Age: 37
End: 2017-11-21

## 2017-11-21 DIAGNOSIS — Z98.890 POST-OPERATIVE NAUSEA AND VOMITING: Primary | ICD-10-CM

## 2017-11-21 DIAGNOSIS — R11.2 POST-OPERATIVE NAUSEA AND VOMITING: Primary | ICD-10-CM

## 2017-11-21 DIAGNOSIS — Z01.818 PREOP GENERAL PHYSICAL EXAM: ICD-10-CM

## 2017-11-21 LAB — HCG UR QL: NEGATIVE

## 2017-11-21 PROCEDURE — 81025 URINE PREGNANCY TEST: CPT | Performed by: PHYSICIAN ASSISTANT

## 2017-11-21 RX ORDER — ONDANSETRON 4 MG/1
4 TABLET, FILM COATED ORAL EVERY 6 HOURS PRN
Qty: 20 TABLET | Refills: 0 | Status: SHIPPED | OUTPATIENT
Start: 2017-11-21 | End: 2018-06-06

## 2017-11-21 NOTE — TELEPHONE ENCOUNTER
Pt is having bilateral Salpingectomy tomorrow. Wants to know if you can order zofran so she can pick it up at [facility] today. States she gets very nauseated after surgery. Please advise

## 2017-11-22 ENCOUNTER — ANESTHESIA EVENT (OUTPATIENT)
Dept: SURGERY | Facility: HOSPITAL | Age: 37
End: 2017-11-22
Payer: COMMERCIAL

## 2017-11-22 ENCOUNTER — SURGERY (OUTPATIENT)
Age: 37
End: 2017-11-22

## 2017-11-22 ENCOUNTER — APPOINTMENT (OUTPATIENT)
Dept: LAB | Facility: HOSPITAL | Age: 37
End: 2017-11-22
Attending: PHYSICIAN ASSISTANT
Payer: COMMERCIAL

## 2017-11-22 ENCOUNTER — ANESTHESIA (OUTPATIENT)
Dept: SURGERY | Facility: HOSPITAL | Age: 37
End: 2017-11-22
Payer: COMMERCIAL

## 2017-11-22 ENCOUNTER — HOSPITAL ENCOUNTER (OUTPATIENT)
Facility: HOSPITAL | Age: 37
Discharge: HOME | End: 2017-11-22
Attending: OBSTETRICS & GYNECOLOGY | Admitting: OBSTETRICS & GYNECOLOGY
Payer: COMMERCIAL

## 2017-11-22 VITALS
TEMPERATURE: 97 F | SYSTOLIC BLOOD PRESSURE: 113 MMHG | RESPIRATION RATE: 16 BRPM | BODY MASS INDEX: 28.51 KG/M2 | WEIGHT: 178 LBS | DIASTOLIC BLOOD PRESSURE: 76 MMHG | HEART RATE: 67 BPM | OXYGEN SATURATION: 97 %

## 2017-11-22 DIAGNOSIS — Z98.890 POST-OPERATIVE STATE: Primary | ICD-10-CM

## 2017-11-22 LAB
B-HCG SERPL-ACNC: NORMAL IU/L (ref 0–5)
HCG UR QL: NEGATIVE

## 2017-11-22 PROCEDURE — 25000132 ZZH RX MED GY IP 250 OP 250 PS 637: Performed by: OBSTETRICS & GYNECOLOGY

## 2017-11-22 PROCEDURE — 25000128 H RX IP 250 OP 636: Performed by: ANESTHESIOLOGY

## 2017-11-22 PROCEDURE — 81025 URINE PREGNANCY TEST: CPT | Performed by: ANESTHESIOLOGY

## 2017-11-22 PROCEDURE — 36000058 ZZH SURGERY LEVEL 3 EA 15 ADDTL MIN: Performed by: OBSTETRICS & GYNECOLOGY

## 2017-11-22 PROCEDURE — S0077 INJECTION, CLINDAMYCIN PHOSP: HCPCS | Performed by: OBSTETRICS & GYNECOLOGY

## 2017-11-22 PROCEDURE — 27110028 ZZH OR GENERAL SUPPLY NON-STERILE: Performed by: OBSTETRICS & GYNECOLOGY

## 2017-11-22 PROCEDURE — 88302 TISSUE EXAM BY PATHOLOGIST: CPT | Mod: TC | Performed by: OBSTETRICS & GYNECOLOGY

## 2017-11-22 PROCEDURE — 25000125 ZZHC RX 250: Performed by: ANESTHESIOLOGY

## 2017-11-22 PROCEDURE — 58661 LAPAROSCOPY REMOVE ADNEXA: CPT | Performed by: OBSTETRICS & GYNECOLOGY

## 2017-11-22 PROCEDURE — 25000125 ZZHC RX 250: Performed by: OBSTETRICS & GYNECOLOGY

## 2017-11-22 PROCEDURE — 25000128 H RX IP 250 OP 636: Performed by: OBSTETRICS & GYNECOLOGY

## 2017-11-22 PROCEDURE — 25000566 ZZH SEVOFLURANE, EA 15 MIN: Performed by: ANESTHESIOLOGY

## 2017-11-22 PROCEDURE — 36000056 ZZH SURGERY LEVEL 3 1ST 30 MIN: Performed by: OBSTETRICS & GYNECOLOGY

## 2017-11-22 PROCEDURE — 25000128 H RX IP 250 OP 636: Performed by: NURSE ANESTHETIST, CERTIFIED REGISTERED

## 2017-11-22 PROCEDURE — 71000027 ZZH RECOVERY PHASE 2 EACH 15 MINS: Performed by: OBSTETRICS & GYNECOLOGY

## 2017-11-22 PROCEDURE — 27210794 ZZH OR GENERAL SUPPLY STERILE: Performed by: OBSTETRICS & GYNECOLOGY

## 2017-11-22 PROCEDURE — 01999 UNLISTED ANES PROCEDURE: CPT | Performed by: NURSE ANESTHETIST, CERTIFIED REGISTERED

## 2017-11-22 PROCEDURE — 37000009 ZZH ANESTHESIA TECHNICAL FEE, EACH ADDTL 15 MIN: Performed by: OBSTETRICS & GYNECOLOGY

## 2017-11-22 PROCEDURE — 25000125 ZZHC RX 250: Performed by: NURSE ANESTHETIST, CERTIFIED REGISTERED

## 2017-11-22 PROCEDURE — 37000008 ZZH ANESTHESIA TECHNICAL FEE, 1ST 30 MIN: Performed by: OBSTETRICS & GYNECOLOGY

## 2017-11-22 PROCEDURE — 58671 LAPAROSCOPY TUBAL BLOCK: CPT | Performed by: ANESTHESIOLOGY

## 2017-11-22 PROCEDURE — 71000014 ZZH RECOVERY PHASE 1 LEVEL 2 FIRST HR: Performed by: OBSTETRICS & GYNECOLOGY

## 2017-11-22 PROCEDURE — 40000305 ZZH STATISTIC PRE PROC ASSESS I: Performed by: OBSTETRICS & GYNECOLOGY

## 2017-11-22 RX ORDER — IBUPROFEN 800 MG/1
800 TABLET, FILM COATED ORAL EVERY 8 HOURS PRN
Qty: 40 TABLET | Refills: 1 | Status: SHIPPED | OUTPATIENT
Start: 2017-11-22 | End: 2018-06-06

## 2017-11-22 RX ORDER — DEXAMETHASONE SODIUM PHOSPHATE 4 MG/ML
4 INJECTION, SOLUTION INTRA-ARTICULAR; INTRALESIONAL; INTRAMUSCULAR; INTRAVENOUS; SOFT TISSUE EVERY 10 MIN PRN
Status: DISCONTINUED | OUTPATIENT
Start: 2017-11-22 | End: 2017-11-24 | Stop reason: HOSPADM

## 2017-11-22 RX ORDER — PROMETHAZINE HYDROCHLORIDE 25 MG/ML
12.5 INJECTION, SOLUTION INTRAMUSCULAR; INTRAVENOUS
Status: DISCONTINUED | OUTPATIENT
Start: 2017-11-22 | End: 2017-11-24 | Stop reason: HOSPADM

## 2017-11-22 RX ORDER — ALBUTEROL SULFATE 0.83 MG/ML
2.5 SOLUTION RESPIRATORY (INHALATION) EVERY 4 HOURS PRN
Status: DISCONTINUED | OUTPATIENT
Start: 2017-11-22 | End: 2017-11-22 | Stop reason: HOSPADM

## 2017-11-22 RX ORDER — KETOROLAC TROMETHAMINE 30 MG/ML
30 INJECTION, SOLUTION INTRAMUSCULAR; INTRAVENOUS ONCE
Status: COMPLETED | OUTPATIENT
Start: 2017-11-22 | End: 2017-11-22

## 2017-11-22 RX ORDER — HYDROCODONE BITARTRATE AND ACETAMINOPHEN 5; 325 MG/1; MG/1
2 TABLET ORAL EVERY 6 HOURS PRN
Qty: 30 TABLET | Refills: 0 | Status: SHIPPED | OUTPATIENT
Start: 2017-11-22 | End: 2018-06-06

## 2017-11-22 RX ORDER — GLYCOPYRROLATE 0.2 MG/ML
INJECTION, SOLUTION INTRAMUSCULAR; INTRAVENOUS PRN
Status: DISCONTINUED | OUTPATIENT
Start: 2017-11-22 | End: 2017-11-22

## 2017-11-22 RX ORDER — CLINDAMYCIN PHOSPHATE 900 MG/50ML
900 INJECTION, SOLUTION INTRAVENOUS
Status: COMPLETED | OUTPATIENT
Start: 2017-11-22 | End: 2017-11-22

## 2017-11-22 RX ORDER — MEPERIDINE HYDROCHLORIDE 25 MG/ML
12.5 INJECTION INTRAMUSCULAR; INTRAVENOUS; SUBCUTANEOUS
Status: DISCONTINUED | OUTPATIENT
Start: 2017-11-22 | End: 2017-11-24 | Stop reason: HOSPADM

## 2017-11-22 RX ORDER — NALOXONE HYDROCHLORIDE 0.4 MG/ML
.1-.4 INJECTION, SOLUTION INTRAMUSCULAR; INTRAVENOUS; SUBCUTANEOUS
Status: ACTIVE | OUTPATIENT
Start: 2017-11-22 | End: 2017-11-23

## 2017-11-22 RX ORDER — KETOROLAC TROMETHAMINE 30 MG/ML
30 INJECTION, SOLUTION INTRAMUSCULAR; INTRAVENOUS EVERY 6 HOURS PRN
Status: DISCONTINUED | OUTPATIENT
Start: 2017-11-22 | End: 2017-11-24 | Stop reason: HOSPADM

## 2017-11-22 RX ORDER — HYDROMORPHONE HYDROCHLORIDE 1 MG/ML
.3-.5 INJECTION, SOLUTION INTRAMUSCULAR; INTRAVENOUS; SUBCUTANEOUS EVERY 10 MIN PRN
Status: DISCONTINUED | OUTPATIENT
Start: 2017-11-22 | End: 2017-11-24 | Stop reason: HOSPADM

## 2017-11-22 RX ORDER — HYDROCODONE BITARTRATE AND ACETAMINOPHEN 5; 325 MG/1; MG/1
1 TABLET ORAL EVERY 6 HOURS PRN
Status: DISCONTINUED | OUTPATIENT
Start: 2017-11-22 | End: 2017-11-24 | Stop reason: HOSPADM

## 2017-11-22 RX ORDER — LIDOCAINE HYDROCHLORIDE 20 MG/ML
INJECTION, SOLUTION INFILTRATION; PERINEURAL PRN
Status: DISCONTINUED | OUTPATIENT
Start: 2017-11-22 | End: 2017-11-22

## 2017-11-22 RX ORDER — ONDANSETRON 4 MG/1
4 TABLET, ORALLY DISINTEGRATING ORAL EVERY 30 MIN PRN
Status: DISCONTINUED | OUTPATIENT
Start: 2017-11-22 | End: 2017-11-24 | Stop reason: HOSPADM

## 2017-11-22 RX ORDER — SODIUM CHLORIDE, SODIUM LACTATE, POTASSIUM CHLORIDE, CALCIUM CHLORIDE 600; 310; 30; 20 MG/100ML; MG/100ML; MG/100ML; MG/100ML
INJECTION, SOLUTION INTRAVENOUS CONTINUOUS
Status: DISCONTINUED | OUTPATIENT
Start: 2017-11-22 | End: 2017-11-22 | Stop reason: HOSPADM

## 2017-11-22 RX ORDER — FENTANYL CITRATE 50 UG/ML
25-50 INJECTION, SOLUTION INTRAMUSCULAR; INTRAVENOUS
Status: CANCELLED | OUTPATIENT
Start: 2017-11-22

## 2017-11-22 RX ORDER — PROPOFOL 10 MG/ML
INJECTION, EMULSION INTRAVENOUS PRN
Status: DISCONTINUED | OUTPATIENT
Start: 2017-11-22 | End: 2017-11-22

## 2017-11-22 RX ORDER — FENTANYL CITRATE 50 UG/ML
INJECTION, SOLUTION INTRAMUSCULAR; INTRAVENOUS PRN
Status: DISCONTINUED | OUTPATIENT
Start: 2017-11-22 | End: 2017-11-22

## 2017-11-22 RX ORDER — NEOSTIGMINE METHYLSULFATE 1 MG/ML
VIAL (ML) INJECTION PRN
Status: DISCONTINUED | OUTPATIENT
Start: 2017-11-22 | End: 2017-11-22

## 2017-11-22 RX ORDER — SODIUM CHLORIDE, SODIUM LACTATE, POTASSIUM CHLORIDE, CALCIUM CHLORIDE 600; 310; 30; 20 MG/100ML; MG/100ML; MG/100ML; MG/100ML
INJECTION, SOLUTION INTRAVENOUS CONTINUOUS
Status: DISCONTINUED | OUTPATIENT
Start: 2017-11-22 | End: 2017-11-24 | Stop reason: HOSPADM

## 2017-11-22 RX ORDER — LABETALOL HYDROCHLORIDE 5 MG/ML
10 INJECTION, SOLUTION INTRAVENOUS
Status: DISCONTINUED | OUTPATIENT
Start: 2017-11-22 | End: 2017-11-22 | Stop reason: HOSPADM

## 2017-11-22 RX ORDER — ONDANSETRON 2 MG/ML
4 INJECTION INTRAMUSCULAR; INTRAVENOUS EVERY 30 MIN PRN
Status: DISCONTINUED | OUTPATIENT
Start: 2017-11-22 | End: 2017-11-24 | Stop reason: HOSPADM

## 2017-11-22 RX ORDER — FENTANYL CITRATE 50 UG/ML
25-50 INJECTION, SOLUTION INTRAMUSCULAR; INTRAVENOUS
Status: DISCONTINUED | OUTPATIENT
Start: 2017-11-22 | End: 2017-11-22 | Stop reason: HOSPADM

## 2017-11-22 RX ORDER — DEXAMETHASONE SODIUM PHOSPHATE 10 MG/ML
INJECTION, SOLUTION INTRAMUSCULAR; INTRAVENOUS PRN
Status: DISCONTINUED | OUTPATIENT
Start: 2017-11-22 | End: 2017-11-22

## 2017-11-22 RX ORDER — ONDANSETRON 2 MG/ML
INJECTION INTRAMUSCULAR; INTRAVENOUS PRN
Status: DISCONTINUED | OUTPATIENT
Start: 2017-11-22 | End: 2017-11-22

## 2017-11-22 RX ORDER — SCOLOPAMINE TRANSDERMAL SYSTEM 1 MG/1
1 PATCH, EXTENDED RELEASE TRANSDERMAL ONCE
Status: COMPLETED | OUTPATIENT
Start: 2017-11-22 | End: 2017-11-22

## 2017-11-22 RX ORDER — HYDRALAZINE HYDROCHLORIDE 20 MG/ML
2.5-5 INJECTION INTRAMUSCULAR; INTRAVENOUS EVERY 10 MIN PRN
Status: DISCONTINUED | OUTPATIENT
Start: 2017-11-22 | End: 2017-11-22 | Stop reason: HOSPADM

## 2017-11-22 RX ADMIN — FENTANYL CITRATE 75 MCG: 50 INJECTION, SOLUTION INTRAMUSCULAR; INTRAVENOUS at 08:30

## 2017-11-22 RX ADMIN — LIDOCAINE HYDROCHLORIDE 40 MG: 20 INJECTION, SOLUTION INFILTRATION; PERINEURAL at 08:08

## 2017-11-22 RX ADMIN — Medication 100 MG: at 08:08

## 2017-11-22 RX ADMIN — ONDANSETRON 4 MG: 2 INJECTION INTRAMUSCULAR; INTRAVENOUS at 09:55

## 2017-11-22 RX ADMIN — SCOPALAMINE 1 PATCH: 1 PATCH, EXTENDED RELEASE TRANSDERMAL at 07:15

## 2017-11-22 RX ADMIN — ROCURONIUM BROMIDE 15 MG: 10 INJECTION INTRAVENOUS at 08:13

## 2017-11-22 RX ADMIN — HYDROCODONE BITARTRATE AND ACETAMINOPHEN 1 TABLET: 5; 325 TABLET ORAL at 10:30

## 2017-11-22 RX ADMIN — KETOROLAC TROMETHAMINE 30 MG: 30 INJECTION, SOLUTION INTRAMUSCULAR at 07:37

## 2017-11-22 RX ADMIN — ONDANSETRON 4 MG: 2 INJECTION INTRAMUSCULAR; INTRAVENOUS at 08:20

## 2017-11-22 RX ADMIN — GLYCOPYRROLATE 0.2 MG: 0.2 INJECTION, SOLUTION INTRAMUSCULAR; INTRAVENOUS at 08:33

## 2017-11-22 RX ADMIN — Medication 4 MG: at 08:54

## 2017-11-22 RX ADMIN — SODIUM CHLORIDE, POTASSIUM CHLORIDE, SODIUM LACTATE AND CALCIUM CHLORIDE 1000 ML: 600; 310; 30; 20 INJECTION, SOLUTION INTRAVENOUS at 07:38

## 2017-11-22 RX ADMIN — ROCURONIUM BROMIDE 10 MG: 10 INJECTION INTRAVENOUS at 08:27

## 2017-11-22 RX ADMIN — DEXAMETHASONE SODIUM PHOSPHATE 10 MG: 10 INJECTION, SOLUTION INTRAMUSCULAR; INTRAVENOUS at 08:20

## 2017-11-22 RX ADMIN — SODIUM CHLORIDE, POTASSIUM CHLORIDE, SODIUM LACTATE AND CALCIUM CHLORIDE: 600; 310; 30; 20 INJECTION, SOLUTION INTRAVENOUS at 09:37

## 2017-11-22 RX ADMIN — FENTANYL CITRATE 50 MCG: 50 INJECTION INTRAMUSCULAR; INTRAVENOUS at 09:20

## 2017-11-22 RX ADMIN — PROPOFOL 200 MG: 10 INJECTION, EMULSION INTRAVENOUS at 08:08

## 2017-11-22 RX ADMIN — MIDAZOLAM HYDROCHLORIDE 2 MG: 1 INJECTION, SOLUTION INTRAMUSCULAR; INTRAVENOUS at 08:01

## 2017-11-22 RX ADMIN — GLYCOPYRROLATE 0.8 MG: 0.2 INJECTION, SOLUTION INTRAMUSCULAR; INTRAVENOUS at 08:54

## 2017-11-22 RX ADMIN — FENTANYL CITRATE 25 MCG: 50 INJECTION, SOLUTION INTRAMUSCULAR; INTRAVENOUS at 08:08

## 2017-11-22 RX ADMIN — ROCURONIUM BROMIDE 5 MG: 10 INJECTION INTRAVENOUS at 08:08

## 2017-11-22 RX ADMIN — CLINDAMYCIN PHOSPHATE 900 MG: 18 INJECTION, SOLUTION INTRAVENOUS at 08:01

## 2017-11-22 RX ADMIN — FENTANYL CITRATE 50 MCG: 50 INJECTION INTRAMUSCULAR; INTRAVENOUS at 09:44

## 2017-11-22 ASSESSMENT — LIFESTYLE VARIABLES: TOBACCO_USE: 1

## 2017-11-22 NOTE — OP NOTE
[facility] Operative Note:    PREOPERATIVE DIAGNOSIS: Desires sterilization.   POSTOPERATIVE DIAGNOSIS: Desires sterilization.   PROCEDURE: Laparoscopic bilateral salpingectomy   ANESTHESIA: General.   COMPLICATIONS: None.   ESTIMATED BLOOD LOSS: Minimal.   SPECIMENS: None.   OPERATIVE FINDINGS: normal pelvic anatomy.   DESCRIPTION OF PROCEDURE: The patients was brought to the operating room and uneventfully placed under general anesthesia. She was prepped and draped in the dorsal lithotomy position and her bladder drained. The cervix was visualized with a weighted speculum and grasped anteriorly with a fine tooth tenaculum and a uterine manipulating device placed. We then changed gloves and proceeded to the abdominal portion of the case. A 5 mm infraumbilical skin incision was performed and a Veress needle introduced without difficulty. A water drop test was performed. The abdomen was insufflated with several liters of carbon dioxide. A 5 mm trocar and a laparoscope were introduced. Visualization was excellent. Findings were as described above. Next, an 11 mm suprapubic port  And 5mm LLQ port were placed under direct visualization. After initial exploration, the uterus was elevated and the mesosalpinx of the left fallopian tube was transected with the Ligasure bipolar cutting cautery device.  The proximal fallopian tube was transected with the Ligasure and the same procedure performed on the right fallopian tube.  The tubes were placed into a laparoscopic endobag and removed through the suprapubic port.   At this point, there was excellent hemostasis so we proceeded to closure.  The suprapubic fascial incision was closed with a Ashwin-Ortega needle with 0 Vicryl.  The remaining trocars were removed and excess carbon dioxide expressed from the abdomen. The subcutaneous spaces were irrigated and checked for hemostasis. The skin incisions were closed with surgical glue. The uterine manipulating device was  removed. There were no complications and the patient was transferred to the recovery room in excellent stable condition.   SERGIO FUCHS MD

## 2017-11-22 NOTE — DISCHARGE INSTRUCTIONS
Post-Anesthesia Patient Instructions    IMMEDIATELY FOLLOWING SURGERY:  Do not drive or operate machinery for the first twenty four hours after surgery.  Do not make any important decisions for twenty four hours after surgery or while taking narcotic pain medications or sedatives.  If you develop intractable nausea and vomiting or a severe headache please notify your doctor immediately.    FOLLOW-UP:  Please make an appointment with your surgeon as instructed. You do not need to follow up with anesthesia unless specifically instructed to do so.    WOUND CARE INSTRUCTIONS (if applicable):  Keep a dry clean dressing on the anesthesia/puncture wound site if there is drainage.  Once the wound has quit draining you may leave it open to air.  Generally you should leave the bandage intact for twenty four hours unless there is drainage.  If the epidural site drains for more than 36-48 hours please call the anesthesia department.    QUESTIONS?:  Please feel free to call your physician or the hospital  if you have any questions, and they will be happy to assist you.   Call MD prior to DC.  No driving today or while on pain meds  Pelvic rest for 4 weeks  Call Dr. Hernandez [phone] as necessary if problems in interim, no post op appt needed.        Remove the scopolamine patch behind your right ear after 24 hours after application.   After removing the patch, wash your hands and the area behind your ear thoroughly with soap and water.   The patch will still contain some medicine after use.   To avoid accidental contact or ingestion by children or pets, fold the used patch in half with the sticky side together and throw away in the trash out of the reach of children and pets.     No heavy lifting, vigorous activity, swim, bath, exercise for 1 week

## 2017-11-22 NOTE — ANESTHESIA PREPROCEDURE EVALUATION
Anesthesia Evaluation     . Pt has had prior anesthetic.     No history of anesthetic complications          ROS/MED HX    ENT/Pulmonary:     (+)tobacco use, Past use quit 2002 packs/day  , . .    Neurologic:  - neg neurologic ROS     Cardiovascular:  - neg cardiovascular ROS       METS/Exercise Tolerance:     Hematologic:  - neg hematologic  ROS       Musculoskeletal:   (+) , , other musculoskeletal- s/p Bilateral Mammoplasty, s/p abdominoplasty       GI/Hepatic:  - neg GI/hepatic ROS       Renal/Genitourinary:     (+) Other Renal/ Genitourinary, DESIRES STERILIZATION      Endo:     (+) thyroid problem  Thyroid disease - Other Goiter, .      Psychiatric:     (+) psychiatric history anxiety      Infectious Disease:  - neg infectious disease ROS       Malignancy:      - no malignancy   Other:    (+) No chance of pregnancy                    Physical Exam  Normal systems: cardiovascular, pulmonary and dental    Airway   TM distance: >3 FB  Neck ROM: full    Dental     Cardiovascular   Rhythm and rate: regular and normal      Pulmonary    breath sounds clear to auscultation                    Anesthesia Plan      History & Physical Review  History and physical reviewed and following examination; no interval change.    ASA Status:  2 .    NPO Status:  > 8 hours    Plan for General and ETT with Intravenous and Propofol induction. Maintenance will be Balanced.    PONV prophylaxis:  Ondansetron (or other 5HT-3), Scopolamine patch and Dexamethasone or Solumedrol  11/22/2017 HCG: Negative      Postoperative Care  Postoperative pain management:  IV analgesics and Oral pain medications.      Consents  Anesthetic plan, risks, benefits and alternatives discussed with:  Patient..                          .

## 2017-11-22 NOTE — OR NURSING
Patient and responsible adult given discharge instructions with no questions regarding instructions. Zohreh score 20 Pain level 1/10.  Discharged from unit via ambulatory. Patient discharged to home.

## 2017-11-22 NOTE — IP AVS SNAPSHOT
MRN:[id number]                      After Visit Summary   11/22/2017    Sheridan Roldan    MRN: [id number]           Thank you!     Thank you for choosing [facility] for your care. Our goal is always to provide you with excellent care. Hearing back from our patients is one way we can continue to improve our services. Please take a few minutes to complete the written survey that you may receive in the mail after you visit with us. Thank you!        Patient Information     Date Of Birth          1980        About your hospital stay     You were admitted on:  November 22, 2017 You last received care in the:  HI Preop/Phase II    You were discharged on:  November 22, 2017       Who to Call     For medical emergencies, please call 911.  For non-urgent questions about your medical care, please call your primary care provider or clinic, [phone]  For questions related to your surgery, please call your surgery clinic        Attending Provider     Provider Specialty    Kalin Hernandez MD OB/Gyn       Primary Care Provider Office Phone # Fax #    SARAH Bond [phone]      Your next 10 appointments already scheduled     Jun 06, 2018  2:00 PM CDT   (Arrive by 1:45 PM)   PHYSICAL with Chioma Wiggins MD   [facility] ([facility] )    [address] [place] [address]  [place] [address]   [phone]              Further instructions from your care team           Post-Anesthesia Patient Instructions    IMMEDIATELY FOLLOWING SURGERY:  Do not drive or operate machinery for the first twenty four hours after surgery.  Do not make any important decisions for twenty four hours after surgery or while taking narcotic pain medications or sedatives.  If you develop intractable nausea and vomiting or a severe headache please notify your doctor immediately.    FOLLOW-UP:  Please make an appointment with your surgeon as instructed. You do not need to follow up with  anesthesia unless specifically instructed to do so.    WOUND CARE INSTRUCTIONS (if applicable):  Keep a dry clean dressing on the anesthesia/puncture wound site if there is drainage.  Once the wound has quit draining you may leave it open to air.  Generally you should leave the bandage intact for twenty four hours unless there is drainage.  If the epidural site drains for more than 36-48 hours please call the anesthesia department.    QUESTIONS?:  Please feel free to call your physician or the hospital  if you have any questions, and they will be happy to assist you.   Call MD prior to DC.  No driving today or while on pain meds  Pelvic rest for 4 weeks  Call Dr. Hernandez [phone] as necessary if problems in interim, no post op appt needed.        Remove the scopolamine patch behind your right ear after 24 hours after application.   After removing the patch, wash your hands and the area behind your ear thoroughly with soap and water.   The patch will still contain some medicine after use.   To avoid accidental contact or ingestion by children or pets, fold the used patch in half with the sticky side together and throw away in the trash out of the reach of children and pets.     No heavy lifting, vigorous activity, swim, bath, exercise for 1 week    Pending Results     No orders found from 11/20/2017 to 11/23/2017.            Admission Information     Date & Time Provider Department Dept. Phone    11/22/2017 Kalin Hernandez MD HI Preop/Phase [phone]      Your Vitals Were     Blood Pressure Pulse Temperature Respirations Weight Pulse Oximetry    113/76 67 97.7  F (36.5  C) (Oral) 16 80.7 kg (178 lb) 97%    BMI (Body Mass Index)                   28.51 kg/m2           Enable Healthcare Information     [employer] gives you secure access to your electronic health record. If you see a primary care provider, you can also send messages to your care team and make appointments. If you have questions, please call your  primary care clinic.  If you do not have a primary care provider, please call [phone] and they will assist you.        Care EveryWhere ID     This is your Care EveryWhere ID. This could be used by other organizations to access your [facility] medical records  [id number]        Equal Access to Services     AMBROSIO ROBERTSON : Jimenez caro Sonancy, waaxda luqadaha, qaybta kaalmada jenniferyacarmela, lizz gay. So [facility] [phone].    ATENCIÓN: Si habla español, tiene a schulz disposición servicios gratuitos de asistencia lingüística. Mathieu al [phone].    We comply with applicable federal civil rights laws and [place] laws. We do not discriminate on the basis of race, color, national origin, age, disability, sex, sexual orientation, or gender identity.               Review of your medicines      START taking        Dose / Directions    HYDROcodone-acetaminophen 5-325 MG per tablet   Commonly known as:  NORCO   Used for:  Post-operative state        Dose:  2 tablet   Take 2 tablets by mouth every 6 hours as needed for moderate to severe pain   Quantity:  30 tablet   Refills:  0       ibuprofen 800 MG tablet   Commonly known as:  ADVIL/MOTRIN   Used for:  Post-operative state        Dose:  800 mg   Take 1 tablet (800 mg) by mouth every 8 hours as needed for moderate pain   Quantity:  40 tablet   Refills:  1         CONTINUE these medicines which have NOT CHANGED        Dose / Directions    escitalopram 10 MG tablet   Commonly known as:  LEXAPRO   Used for:  Major depressive disorder, single episode, moderate (H)        TAKE 1 TABLET BY MOUTH DAILY   Quantity:  90 tablet   Refills:  0       levothyroxine 75 MCG tablet   Commonly known as:  SYNTHROID   Used for:  Goiter        Dose:  75 mcg   Take 1 tablet (75 mcg) by mouth daily   Quantity:  90 tablet   Refills:  4       MIRENA (52 MG) 20 MCG/24HR IUD   Generic drug:  levonorgestrel        Dose:  1 each   1 each by Intrauterine route  continuous   Refills:  0       ondansetron 4 MG tablet   Commonly known as:  ZOFRAN   Used for:  Post-operative nausea and vomiting        Dose:  4 mg   Take 1 tablet (4 mg) by mouth every 6 hours as needed for nausea or vomiting   Quantity:  20 tablet   Refills:  0       valACYclovir 1000 mg tablet   Commonly known as:  VALTREX   Used for:  Blister (nonthermal) of vagina and vulva, initial encounter        Dose:  1000 mg   Take 1 tablet (1,000 mg) by mouth 2 times daily   Quantity:  20 tablet   Refills:  0       VITAMIN C PO        Dose:  500 mg   Take 500 mg by mouth daily   Refills:  0       VITAMIN D (CHOLECALCIFEROL) PO        Dose:  6000 Units   Take 6,000 Units by mouth daily   Refills:  0            Where to get your medicines      These medications were sent to [facility] - [address]  [address] [address]     Phone:  [phone]     ibuprofen 800 MG tablet         Some of these will need a paper prescription and others can be bought over the counter. Ask your nurse if you have questions.     Bring a paper prescription for each of these medications     HYDROcodone-acetaminophen 5-325 MG per tablet                Protect others around you: Learn how to safely use, store and throw away your medicines at www.disposemymeds.org.             Medication List: This is a list of all your medications and when to take them. Check marks below indicate your daily home schedule. Keep this list as a reference.      Medications           Morning Afternoon Evening Bedtime As Needed    escitalopram 10 MG tablet   Commonly known as:  LEXAPRO   TAKE 1 TABLET BY MOUTH DAILY                                HYDROcodone-acetaminophen 5-325 MG per tablet   Commonly known as:  NORCO   Take 2 tablets by mouth every 6 hours as needed for moderate to severe pain   Last time this was given:  1 tablet on 11/22/2017 10:30 AM                                ibuprofen 800 MG tablet   Commonly  known as:  ADVIL/MOTRIN   Take 1 tablet (800 mg) by mouth every 8 hours as needed for moderate pain                                levothyroxine 75 MCG tablet   Commonly known as:  SYNTHROID   Take 1 tablet (75 mcg) by mouth daily                                MIRENA (52 MG) 20 MCG/24HR IUD   1 each by Intrauterine route continuous   Generic drug:  levonorgestrel                                ondansetron 4 MG tablet   Commonly known as:  ZOFRAN   Take 1 tablet (4 mg) by mouth every 6 hours as needed for nausea or vomiting                                valACYclovir 1000 mg tablet   Commonly known as:  VALTREX   Take 1 tablet (1,000 mg) by mouth 2 times daily                                VITAMIN C PO   Take 500 mg by mouth daily                                VITAMIN D (CHOLECALCIFEROL) PO   Take 6,000 Units by mouth daily

## 2017-11-22 NOTE — IP AVS SNAPSHOT
HI Preop/Phase II    [address] [address]    Phone:  [phone]                                       After Visit Summary   11/22/2017    Sheridan Roldan    MRN: [id number]           After Visit Summary Signature Page     I have received my discharge instructions, and my questions have been answered. I have discussed any challenges I see with this plan with the nurse or doctor.    ..........................................................................................................................................  Patient/Patient Representative Signature      ..........................................................................................................................................  Patient Representative Print Name and Relationship to Patient    ..................................................               ................................................  Date                                            Time    ..........................................................................................................................................  Reviewed by Signature/Title    ...................................................              ..............................................  Date                                                            Time

## 2017-11-22 NOTE — INTERVAL H&P NOTE
History and physical reviewed on 11/22/2017.  Patient examined. No interval change in condition.  Reviewed goals, risks, alternatives for planned procedure of salpingectomy.  Including risk of bleeding, infection, damage to nerves, blood vessels, bowel and bladder. Discussed recovery period and expected discomfort.. All questions were answered. Consent form reviewed and signed.     Kalin Hernandez MD  8:06 AM

## 2017-11-22 NOTE — ANESTHESIA CARE TRANSFER NOTE
Patient: Sheridan Roldan    Procedure(s):  LAPAROSCOPIC BILATERAL SALPINGECTOMY - Wound Class: II-Clean Contaminated    Diagnosis: DESIRES STERILIZATION  Diagnosis Additional Information: No value filed.    Anesthesia Type:   General, ETT     Note:  Airway :Nasal Cannula  Patient transferred to:PACU  Handoff Report: Identifed the Patient, Identified the Reponsible Provider, Reviewed the pertinent medical history, Discussed the surgical course, Reviewed Intra-OP anesthesia mangement and issues during anesthesia, Set expectations for post-procedure period and Allowed opportunity for questions and acknowledgement of understanding      Vitals: (Last set prior to Anesthesia Care Transfer)    CRNA VITALS  11/22/2017 0841 - 11/22/2017 0911      11/22/2017             Resp Rate (set): 8                Electronically Signed By: REYNA Restrepo CRNA  November 22, 2017  9:11 AM

## 2017-11-24 NOTE — ANESTHESIA POSTPROCEDURE EVALUATION
Patient: Sheridan Roldan    Procedure(s):  LAPAROSCOPIC BILATERAL SALPINGECTOMY - Wound Class: II-Clean Contaminated    Diagnosis:DESIRES STERILIZATION  Diagnosis Additional Information: No value filed.    Anesthesia Type:  General, ETT    Note:  Anesthesia Post Evaluation    Patient location during evaluation: PACU, Phase 2 and Bedside  Patient participation: Able to fully participate in evaluation  Level of consciousness: awake and alert  Pain management: adequate  Airway patency: patent  Cardiovascular status: acceptable  Respiratory status: acceptable  Hydration status: stable  PONV: none     Anesthetic complications: None          Last vitals:  Vitals:    11/22/17 1045 11/22/17 1100 11/22/17 1108   BP: 110/79 113/76    Pulse:      Resp: 16 16    Temp:   97  F (36.1  C)   SpO2: 97% 97%          Electronically Signed By: Trev Quiroz MD  November 24, 2017  5:37 AM

## 2017-11-27 LAB — COPATH REPORT: NORMAL

## 2018-03-15 DIAGNOSIS — M21.612 BUNION, LEFT FOOT: Primary | ICD-10-CM

## 2018-04-03 ENCOUNTER — TELEPHONE (OUTPATIENT)
Dept: OBGYN | Facility: OTHER | Age: 38
End: 2018-04-03

## 2018-04-03 ENCOUNTER — ALLIED HEALTH/NURSE VISIT (OUTPATIENT)
Dept: OBGYN | Facility: OTHER | Age: 38
End: 2018-04-03
Attending: OBSTETRICS & GYNECOLOGY
Payer: COMMERCIAL

## 2018-04-03 DIAGNOSIS — N89.8 VAGINAL ITCHING: Primary | ICD-10-CM

## 2018-04-03 DIAGNOSIS — N89.8 VAGINAL ITCHING: ICD-10-CM

## 2018-04-03 DIAGNOSIS — N76.0 BACTERIAL VAGINOSIS: ICD-10-CM

## 2018-04-03 DIAGNOSIS — B96.89 BACTERIAL VAGINOSIS: ICD-10-CM

## 2018-04-03 LAB
SPECIMEN SOURCE: ABNORMAL
WET PREP SPEC: ABNORMAL

## 2018-04-03 PROCEDURE — 87210 SMEAR WET MOUNT SALINE/INK: CPT | Performed by: OBSTETRICS & GYNECOLOGY

## 2018-04-03 RX ORDER — METRONIDAZOLE 500 MG/1
2000 TABLET ORAL ONCE
Qty: 4 TABLET | Refills: 0 | Status: SHIPPED | OUTPATIENT
Start: 2018-04-03 | End: 2018-04-03

## 2018-04-03 NOTE — TELEPHONE ENCOUNTER
C/o vaginal itching and odor. Denies possibility of STD's. Would like to do wet prep today.    This OK? Please sign future order if OK.

## 2018-04-03 NOTE — MR AVS SNAPSHOT
After Visit Summary   4/3/2018    Sheridan Roldan    MRN: [id number]           Patient Information     Date Of Birth          1980        Visit Information        Provider Department      4/3/2018 2:00 PM HC OB/GYN NURSE [facility]        Today's Diagnoses     Vaginal itching          Care Instructions    Await results. Can also try OTC yeast treatment to see if itching and odor resolve.           Follow-ups after your visit        Your next 10 appointments already scheduled     Jun 06, 2018  9:30 AM CDT   (Arrive by 9:15 AM)   PHYSICAL with Chioma Wiggins MD   [facility] [place] ([facility] [place] )    [address]  [place] [address]   [phone]              Who to contact     If you have questions or need follow up information about today's clinic visit or your schedule please contact [facility] [place] directly at [phone].  Normal or non-critical lab and imaging results will be communicated to you by MyChart, letter or phone within 4 business days after the clinic has received the results. If you do not hear from us within 7 days, please contact the clinic through [employer] or phone. If you have a critical or abnormal lab result, we will notify you by phone as soon as possible.  Submit refill requests through [employer] or call your pharmacy and they will forward the refill request to us. Please allow 3 business days for your refill to be completed.          Additional Information About Your Visit        MyChart Information     [employer] gives you secure access to your electronic health record. If you see a primary care provider, you can also send messages to your care team and make appointments. If you have questions, please call your primary care clinic.  If you do not have a primary care provider, please call [phone] and they will assist you.        Care EveryWhere ID     This is your Care EveryWhere ID. This could be used by other  organizations to access your [place] medical records  [id number]         Blood Pressure from Last 3 Encounters:   11/22/17 113/76   10/31/17 114/76   10/26/17 116/66    Weight from Last 3 Encounters:   11/22/17 178 lb (80.7 kg)   10/31/17 176 lb (79.8 kg)   10/26/17 176 lb (79.8 kg)              We Performed the Following     Wet prep        Primary Care Provider Office Phone # Fax #    SARAH Bond [phone]       [facility] [address]  [place] [address]        Equal Access to Services     [facility] MARCELO : Hadii aad ku hadasho Soomaali, waaxda luqadaha, qaybta kaalmada adeegyada, waxay idiin hayaan aderufus herring . So [facility] [phone].    ATENCIÓN: Si habla español, tiene a schulz disposición servicios gratuitos de asistencia lingüística. [facility] [phone].    We comply with applicable federal civil rights laws and [place] laws. We do not discriminate on the basis of race, color, national origin, age, disability, sex, sexual orientation, or gender identity.            Thank you!     Thank you for choosing [facility] [place]  for your care. Our goal is always to provide you with excellent care. Hearing back from our patients is one way we can continue to improve our services. Please take a few minutes to complete the written survey that you may receive in the mail after your visit with us. Thank you!             Your Updated Medication List - Protect others around you: Learn how to safely use, store and throw away your medicines at www.disposemymeds.org.          This list is accurate as of 4/3/18  2:23 PM.  Always use your most recent med list.                   Brand Name Dispense Instructions for use Diagnosis    escitalopram 10 MG tablet    LEXAPRO    90 tablet    TAKE 1 TABLET BY MOUTH DAILY    Major depressive disorder, single episode, moderate (H)       HYDROcodone-acetaminophen 5-325 MG per tablet    NORCO    30 tablet    Take 2 tablets by  mouth every 6 hours as needed for moderate to severe pain    Post-operative state       ibuprofen 800 MG tablet    ADVIL/MOTRIN    40 tablet    Take 1 tablet (800 mg) by mouth every 8 hours as needed for moderate pain    Post-operative state       levothyroxine 75 MCG tablet    SYNTHROID/LEVOTHROID    90 tablet    TAKE 1 TABLET BY MOUTH DAILY    Goiter       MIRENA (52 MG) 20 MCG/24HR IUD   Generic drug:  levonorgestrel      1 each by Intrauterine route continuous        ondansetron 4 MG tablet    ZOFRAN    20 tablet    Take 1 tablet (4 mg) by mouth every 6 hours as needed for nausea or vomiting    Post-operative nausea and vomiting       valACYclovir 1000 mg tablet    VALTREX    20 tablet    Take 1 tablet (1,000 mg) by mouth 2 times daily    Blister (nonthermal) of vagina and vulva, initial encounter       VITAMIN C PO      Take 500 mg by mouth daily        VITAMIN D (CHOLECALCIFEROL) PO      Take 6,000 Units by mouth daily

## 2018-04-03 NOTE — TELEPHONE ENCOUNTER
Discussed with patient. She wanted to do wet prep because she said it didn't feel like yeast. If needs RX; prefers oral because she does not like vaginal medications.

## 2018-04-03 NOTE — TELEPHONE ENCOUNTER
Discussed with Dr. Wiggins. Patient can do wet prep; or can try to just treat yeast and see if itching and odor go away.    Tried to call patient. No answer. Left message for patient to return call.

## 2018-04-03 NOTE — PROGRESS NOTES
Patient here for wet prep for complaints of vaginal odor and vaginal itching. Wet prep done and sent to lab.    HANNAH GALLEGOS

## 2018-05-25 ENCOUNTER — TELEPHONE (OUTPATIENT)
Dept: FAMILY MEDICINE | Facility: OTHER | Age: 38
End: 2018-05-25

## 2018-05-25 DIAGNOSIS — R07.0 THROAT PAIN: Primary | ICD-10-CM

## 2018-05-25 DIAGNOSIS — R07.0 THROAT PAIN: ICD-10-CM

## 2018-05-25 LAB
DEPRECATED S PYO AG THROAT QL EIA: NORMAL
SPECIMEN SOURCE: NORMAL

## 2018-05-25 PROCEDURE — 87081 CULTURE SCREEN ONLY: CPT | Performed by: NURSE PRACTITIONER

## 2018-05-25 PROCEDURE — 87880 STREP A ASSAY W/OPTIC: CPT | Performed by: NURSE PRACTITIONER

## 2018-05-25 NOTE — TELEPHONE ENCOUNTER
Pt states sore throat, feeling warm but no fever, pus in back of throat and not feeling well. Please sign order for rapid strep. Thank you. Scarlett Marquez LPN

## 2018-05-27 LAB
BACTERIA SPEC CULT: NORMAL
SPECIMEN SOURCE: NORMAL

## 2018-06-06 ENCOUNTER — OFFICE VISIT (OUTPATIENT)
Dept: OBGYN | Facility: OTHER | Age: 38
End: 2018-06-06
Attending: OBSTETRICS & GYNECOLOGY
Payer: COMMERCIAL

## 2018-06-06 VITALS
SYSTOLIC BLOOD PRESSURE: 92 MMHG | BODY MASS INDEX: 29.25 KG/M2 | HEIGHT: 66 IN | WEIGHT: 182 LBS | DIASTOLIC BLOOD PRESSURE: 62 MMHG | HEART RATE: 68 BPM

## 2018-06-06 DIAGNOSIS — Z30.09 FAMILY PLANNING: ICD-10-CM

## 2018-06-06 DIAGNOSIS — E04.9 GOITER: ICD-10-CM

## 2018-06-06 DIAGNOSIS — Z98.890 POST-OPERATIVE STATE: ICD-10-CM

## 2018-06-06 DIAGNOSIS — F32.1 MAJOR DEPRESSIVE DISORDER, SINGLE EPISODE, MODERATE (H): ICD-10-CM

## 2018-06-06 DIAGNOSIS — B00.9 HSV (HERPES SIMPLEX VIRUS) INFECTION: ICD-10-CM

## 2018-06-06 DIAGNOSIS — Z00.00 ROUTINE GENERAL MEDICAL EXAMINATION AT A HEALTH CARE FACILITY: Primary | ICD-10-CM

## 2018-06-06 DIAGNOSIS — Z12.31 VISIT FOR SCREENING MAMMOGRAM: ICD-10-CM

## 2018-06-06 PROBLEM — Z90.79: Status: ACTIVE | Noted: 2018-06-06

## 2018-06-06 PROCEDURE — 58300 INSERT INTRAUTERINE DEVICE: CPT | Performed by: OBSTETRICS & GYNECOLOGY

## 2018-06-06 PROCEDURE — 88142 CYTOPATH C/V THIN LAYER: CPT | Performed by: OBSTETRICS & GYNECOLOGY

## 2018-06-06 PROCEDURE — 76830 TRANSVAGINAL US NON-OB: CPT | Performed by: OBSTETRICS & GYNECOLOGY

## 2018-06-06 PROCEDURE — 99000 SPECIMEN HANDLING OFFICE-LAB: CPT | Performed by: OBSTETRICS & GYNECOLOGY

## 2018-06-06 PROCEDURE — 87624 HPV HI-RISK TYP POOLED RSLT: CPT | Mod: 90 | Performed by: OBSTETRICS & GYNECOLOGY

## 2018-06-06 PROCEDURE — 99395 PREV VISIT EST AGE 18-39: CPT | Mod: 25 | Performed by: OBSTETRICS & GYNECOLOGY

## 2018-06-06 RX ORDER — ESCITALOPRAM OXALATE 10 MG/1
10 TABLET ORAL DAILY
Qty: 90 TABLET | Refills: 4 | Status: SHIPPED | OUTPATIENT
Start: 2018-06-06 | End: 2019-06-10

## 2018-06-06 RX ORDER — LEVOTHYROXINE SODIUM 75 UG/1
75 TABLET ORAL DAILY
Qty: 90 TABLET | Refills: 4 | Status: SHIPPED | OUTPATIENT
Start: 2018-06-06 | End: 2019-06-10

## 2018-06-06 RX ORDER — ACYCLOVIR 50 MG/G
CREAM TOPICAL
Qty: 5 G | Refills: 12 | Status: SHIPPED | OUTPATIENT
Start: 2018-06-06 | End: 2020-11-24

## 2018-06-06 RX ORDER — IBUPROFEN 800 MG/1
800 TABLET, FILM COATED ORAL EVERY 8 HOURS PRN
Qty: 40 TABLET | Refills: 1 | Status: SHIPPED | OUTPATIENT
Start: 2018-06-06 | End: 2022-05-18

## 2018-06-06 RX ORDER — ACYCLOVIR 50 MG/G
CREAM TOPICAL
Qty: 5 G | Refills: 3 | Status: SHIPPED | OUTPATIENT
Start: 2018-06-06 | End: 2018-08-22

## 2018-06-06 ASSESSMENT — ANXIETY QUESTIONNAIRES
2. NOT BEING ABLE TO STOP OR CONTROL WORRYING: NOT AT ALL
6. BECOMING EASILY ANNOYED OR IRRITABLE: NOT AT ALL
3. WORRYING TOO MUCH ABOUT DIFFERENT THINGS: NOT AT ALL
7. FEELING AFRAID AS IF SOMETHING AWFUL MIGHT HAPPEN: NOT AT ALL
5. BEING SO RESTLESS THAT IT IS HARD TO SIT STILL: NOT AT ALL
1. FEELING NERVOUS, ANXIOUS, OR ON EDGE: NOT AT ALL
GAD7 TOTAL SCORE: 0

## 2018-06-06 ASSESSMENT — PATIENT HEALTH QUESTIONNAIRE - PHQ9: 5. POOR APPETITE OR OVEREATING: NOT AT ALL

## 2018-06-06 ASSESSMENT — PAIN SCALES - GENERAL: PAINLEVEL: NO PAIN (0)

## 2018-06-06 NOTE — PROGRESS NOTES
ANNUAL    Sheridan is a 37 year old  female who presents for annual exam.   Youngest Child: 8  Largest Child: 8#2  Gestational Diabetes: n Hypertension: at the end of one  No LMP recorded. Patient is not currently having periods (Reason: IUD).  Menses: light pain n Contraception Mirena.  Planning pregnancy: n had tubes removed  Concerns in addition to routine health maintenance?  Needs Mirena replaced.RBAQ's  GYNECOLOGIC HISTORY:   Surgery: salpingectomy  History sexually transmitted infections:CT and herpes  Safe?  y  STI testing offered?  y  History of abnormal Pap smear: y   Problems with sex? n  Family history of: breast cancer: n   Uterine cancer pc ovarian cancer: n   colon cancer: n    HEALTH MAINTENANCE:  Cholesterol: (  Cholesterol   Date Value Ref Range Status   2017 140 <200 mg/dL Final   2015 113 <200 mg/dL Final     Comment:     LDL Cholesterol is the primary guide to therapy.   The NCEP recommends further evaluation of: patients with cholesterol greater   than 200 mg/dL if additional risk factors are present, cholesterol greater   than   240 mg/dL, triglycerides greater than 150 mg/dL, or HDL less than 40 mg/dL.      History of abnormal lipids: n  Mammo: y . History of abnormal Mammo:n   Regular Self Breast Exams: n Calcium/Vitamin D or Vitamin: y Exercise n    TSH: (  TSH   Date Value Ref Range Status   2017 1.34 0.40 - 4.00 mU/L Final    )  Pap; (  Lab Results   Component Value Date    PAP ASC-US 2017    PAP LSIL 2016    PAP NIL 2014    )    HISTORY:  Obstetric History       T2      L2     SAB0   TAB0   Ectopic0   Multiple0   Live Births2       # Outcome Date GA Lbr Renato/2nd Weight Sex Delivery Anes PTL Lv   2 Term 2008 39w0d  8 lb 2 oz (3.685 kg) F Vag-Spont   RITA      Name: Piper   1 Term 2006 41w0d 08:00 7 lb 10 oz (3.459 kg) F Vag-Spont   RITA      Name: Bladimir        Past Medical History:   Diagnosis Date     Acute pharyngitis 2000      Anxiety state, unspecified 3/12/2007     Breech presentation without mention of version, antepartum 1/10/2006     Galactorrhea not associated with childbirth 05/10/2005     Leukorrhea, not specified as infective 2005     Post term pregnancy, antepartum condition or complication 2006     Routine general medical examination at a health care facility 2004     Spotting complicating pregnancy, antepartum condition or complication 2008     Supervision of other normal pregnancy 2005     Threatened , antepartum 2007     Past Surgical History:   Procedure Laterality Date     ABDOMINOPLASTY       CONIZATION LEEP N/A 2016    Procedure: CONIZATION LEEP;  Surgeon: Chioma Wiggins MD;  Location: [facility]     LAPAROSCOPIC SALPINGECTOMY Bilateral 2017    Procedure: LAPAROSCOPIC SALPINGECTOMY;  LAPAROSCOPIC BILATERAL SALPINGECTOMY;  Surgeon: Kalin Hernandez MD;  Location: [facility]     MAMMOPLASTY AUGMENTATION BILATERAL       Family History   Problem Relation Age of Onset     Hypertension Father      Other - See Comments Father      lymphoma     Uterine Cancer Cousin      Social History     Social History     Marital status:      Spouse name: N/A     Number of children: N/A     Years of education: N/A     Occupational History     Rn [employer] [place]     Social History Main Topics     Smoking status: Former Smoker     Quit date: 2002     Smokeless tobacco: Never Used     Alcohol use Yes      Comment: socially     Drug use: No     Sexual activity: Yes     Partners: Male     Other Topics Concern     Caffeine Concern Yes     coffee 3 cups     Parent/Sibling W/ Cabg, Mi Or Angioplasty Before 65f 55m? No     Social History Narrative       Current Outpatient Prescriptions:      escitalopram (LEXAPRO) 10 MG tablet, TAKE 1 TABLET BY MOUTH DAILY, Disp: 90 tablet, Rfl: 0     ibuprofen (ADVIL/MOTRIN) 800 MG tablet, Take 1 tablet (800 mg) by mouth every 8 hours as needed for  "moderate pain, Disp: 40 tablet, Rfl: 1     levonorgestrel (MIRENA) 20 MCG/24HR IUD, 1 each by Intrauterine route continuous , Disp: , Rfl:      levothyroxine (SYNTHROID/LEVOTHROID) 75 MCG tablet, TAKE 1 TABLET BY MOUTH DAILY, Disp: 90 tablet, Rfl: 0     VITAMIN D, CHOLECALCIFEROL, PO, Take 6,000 Units by mouth daily, Disp: , Rfl:      Allergies   Allergen Reactions     Erythromycin Nausea and Vomiting     Erythromycin base     Penicillins Rash       Past medical, surgical, social and family history were reviewed and updated in EPIC.    ROS:  Easy bruising  CONSTITUTIONAL:     NEGATIVE for fever, chills, change in weight  INTEGUMENTARY/SKIN:       NEGATIVE for worrisome rashes, moles or lesions  EYES:     NEGATIVE for vision changes or irritation  ENT/MOUTH: NEGATIVE for ear, mouth and throat problems  RESP:     NEGATIVE for significant cough or SOB  CV:   NEGATIVE for chest pain, palpitations or peripheral edema  GI:     NEGATIVE for nausea, abdominal pain, heartburn, or change in bowel habits  :   NEGATIVE for frequency, dysuria, hematuria, vaginal discharge, or irregular bleeding,incontinence   MUSCULOSKELETAL:     NEGATIVE for significant arthralgias or myalgia  NEURO:      NEGATIVE for weakness, dizziness or paresthesias  ENDOCRINE:      NEGATIVE for temperature intolerance, skin/hair changes  PSYCHIATRIC:      NEGATIVE for changes in mood or affect.     EXAM:   BP 92/62  Pulse 68  Ht 5' 6.25\" (1.683 m)  Wt 182 lb (82.6 kg)  BMI 29.15 kg/m2   BMI: Body mass index is 29.15 kg/(m^2).  Constitutional: healthy, alert and no distress  Head: Normocephalic. No masses, lesions, tenderness or abnormalities  Neck: Neck supple. Trachea midline. No adenopathy. Thyroid symmetric, normal size.   Cardiovascular: RRR.   Respiratory: lungs clear   Breast: Breasts reveal mild symmetric fibrocystic densities, but there are no dominant, discrete, fixed or suspicious masses found.  Gastrointestinal: Abdomen soft, non-tender, " non-distended. No masses, organomegaly.  :  Vulva:  No external lesions, normal female hair distribution, no inguinal adenopathy.    Urethra:  Midline, non-tender, well supported, no discharge  Vagina:  Moist, pink, no abnormal discharge, no lesions  Cervix: clean string in place  Procedure:  After verbal consent was obtained from the patient, IUD strings were grasped with ring forcep and IUD easily removed intact with minimal patient discomfort noted.  No bleeding noted.  Mirena replaced in usual manner without comps  Uterus:   Normal size , non-tender, freely mobile  Ovaries:  No masses appreciated  Rectal Exam: not done  Musculoskeletal: extremities normal  Skin: no suspicious lesions or rashes  Psychiatric: Affect appropriate, cooperative,mentation appears normal.     COUNSELING:   regular exercise  healthy diet/nutrition  Immunizations   contraception  family planning  Folic Acid Counseling   reports that she quit smoking about 16 years ago. She has never used smokeless tobacco.  Tobacco Cessation Action Plan: na  Body mass index is 29.15 kg/(m^2).  Weight management plan: lo gly  FRAX Risk Assessment    ASSESSMENT:  37 year old female with satisfactory annual exam  Family planning  PLAN:   Pap with High Risk hpv with cytobrush  Tsh,fasting cholesterol with lipid profile,fasting blood sugar,hp,hga1c,vit d tomorrow  acyvlovir cream to here done  mammogram   Check MIIC  Return to office: 1 year RE  rto 3 wks check IUD    Greater than 0 minutes spent discussing other than annual     Chioma Wiggins MD

## 2018-06-06 NOTE — MR AVS SNAPSHOT
After Visit Summary   6/6/2018    Sheridan Roldan    MRN: [id number]           Patient Information     Date Of Birth          1980        Visit Information        Provider Department      6/6/2018 9:30 AM Chioma Wiggins MD [place]        Today's Diagnoses     Routine general medical examination at a health care facility    -  1    Goiter        Major depressive disorder, single episode, moderate (H)        Post-operative state        HSV (herpes simplex virus) infection        Visit for screening mammogram          Care Instructions    Pap test today.  Fasting lab tomorrow.  Mammogram to be scheduled. You will be contacted by [facility] diagnostic imaging to make this appointment. Please call the nurse at [phone] if you have not been contacted in a timely manner to schedule.    Return for annual exam in 1 year with Alejandro Kee.            Follow-ups after your visit        Future tests that were ordered for you today     Open Future Orders        Priority Expected Expires Ordered    CBC with platelets Routine 6/7/2018 6/6/2019 6/6/2018    Glucose Routine 6/7/2018 6/6/2019 6/6/2018    Hemoglobin A1c Routine 6/7/2018 6/6/2019 6/6/2018    Lipid Profile Routine 6/7/2018 6/6/2019 6/6/2018    TSH Routine 6/7/2018 6/6/2019 6/6/2018    Vitamin D Deficiency Routine 6/7/2018 6/6/2019 6/6/2018    MA Screening Digital Bilateral Routine 10/28/2018 6/6/2019 6/6/2018            Who to contact     If you have questions or need follow up information about today's clinic visit or your schedule please contact [facility] [place] directly at [phone].  Normal or non-critical lab and imaging results will be communicated to you by MyChart, letter or phone within 4 business days after the clinic has received the results. If you do not hear from us within 7 days, please contact the clinic through MyChart or phone. If you have a critical or abnormal lab result, we will notify you by  "phone as soon as possible.  Submit refill requests through [employer] or call your pharmacy and they will forward the refill request to us. Please allow 3 business days for your refill to be completed.          Additional Information About Your Visit        [employer] Information     [employer] gives you secure access to your electronic health record. If you see a primary care provider, you can also send messages to your care team and make appointments. If you have questions, please call your primary care clinic.  If you do not have a primary care provider, please call [phone] and they will assist you.        Care EveryWhere ID     This is your Care EveryWhere ID. This could be used by other organizations to access your [facility] medical records  [id number]        Your Vitals Were     Pulse Height BMI (Body Mass Index)             68 5' 6.25\" (1.683 m) 29.15 kg/m2          Blood Pressure from Last 3 Encounters:   06/06/18 92/62   11/22/17 113/76   10/31/17 114/76    Weight from Last 3 Encounters:   06/06/18 182 lb (82.6 kg)   11/22/17 178 lb (80.7 kg)   10/31/17 176 lb (79.8 kg)              We Performed the Following     A pap thin layer screen with  HPV - recommended age 30 - 65 years (select HPV order below)     HPV High Risk Types DNA Cervical          Today's Medication Changes          These changes are accurate as of 6/6/18 10:11 AM.  If you have any questions, ask your nurse or doctor.               Start taking these medicines.        Dose/Directions    acyclovir 5 % cream   Commonly known as:  ZOVIRAX   Used for:  HSV (herpes simplex virus) infection   Started by:  Chioma Wiggins MD        Apply topically 5 times daily   Quantity:  5 g   Refills:  3         These medicines have changed or have updated prescriptions.        Dose/Directions    escitalopram 10 MG tablet   Commonly known as:  LEXAPRO   This may have changed:  See the new instructions.   Used for:  Major depressive disorder, single episode, " moderate (H)   Changed by:  Chioma Wiggins MD        Dose:  10 mg   Take 1 tablet (10 mg) by mouth daily   Quantity:  90 tablet   Refills:  4       levothyroxine 75 MCG tablet   Commonly known as:  SYNTHROID/LEVOTHROID   This may have changed:  See the new instructions.   Used for:  Goiter   Changed by:  Chioma Wiggins MD        Dose:  75 mcg   Take 1 tablet (75 mcg) by mouth daily   Quantity:  90 tablet   Refills:  4            Where to get your medicines      These medications were sent to [facility] - [place], [place] - [address]  [address] [facility] [address]     Phone:  [phone]     acyclovir 5 % cream    escitalopram 10 MG tablet    ibuprofen 800 MG tablet    levothyroxine 75 MCG tablet                Primary Care Provider Office Phone # Fax #    SARAH Bond [phone]       [address]  [facility] [address]        Equal Access to Services     [facility]: Hadii charan ku hadasho Soomaali, waaxda luqadaha, qaybta kaalmada adeegyada, waxay idiin hayaan jennifer herring . So [facility] [phone].    ATENCIÓN: Si danitza espjose l, tiene a schulz disposición servicios gratuitos de asistencia lingüística. Llame al [phone].    We comply with applicable federal civil rights laws and [place] laws. We do not discriminate on the basis of race, color, national origin, age, disability, sex, sexual orientation, or gender identity.            Thank you!     Thank you for choosing [facility]  for your care. Our goal is always to provide you with excellent care. Hearing back from our patients is one way we can continue to improve our services. Please take a few minutes to complete the written survey that you may receive in the mail after your visit with us. Thank you!             Your Updated Medication List - Protect others around you: Learn how to safely use, store and throw away your medicines at www.disposemymeds.org.          This list is  accurate as of 6/6/18 10:11 AM.  Always use your most recent med list.                   Brand Name Dispense Instructions for use Diagnosis    acyclovir 5 % cream    ZOVIRAX    5 g    Apply topically 5 times daily    HSV (herpes simplex virus) infection       escitalopram 10 MG tablet    LEXAPRO    90 tablet    Take 1 tablet (10 mg) by mouth daily    Major depressive disorder, single episode, moderate (H)       ibuprofen 800 MG tablet    ADVIL/MOTRIN    40 tablet    Take 1 tablet (800 mg) by mouth every 8 hours as needed for moderate pain    Post-operative state       levothyroxine 75 MCG tablet    SYNTHROID/LEVOTHROID    90 tablet    Take 1 tablet (75 mcg) by mouth daily    Goiter       MIRENA (52 MG) 20 MCG/24HR IUD   Generic drug:  levonorgestrel      1 each by Intrauterine route continuous        VITAMIN D (CHOLECALCIFEROL) PO      Take 6,000 Units by mouth daily

## 2018-06-06 NOTE — NURSING NOTE
"Chief Complaint   Patient presents with     Gyn Exam       Initial BP 92/62  Pulse 68  Ht 5' 6.25\" (1.683 m)  Wt 182 lb (82.6 kg)  BMI 29.15 kg/m2 Estimated body mass index is 29.15 kg/(m^2) as calculated from the following:    Height as of this encounter: 5' 6.25\" (1.683 m).    Weight as of this encounter: 182 lb (82.6 kg).  Medication Reconciliation: complete    Debra Dave LPN    "

## 2018-06-06 NOTE — PATIENT INSTRUCTIONS
Pap test today.  Fasting lab tomorrow.    Mammogram to be scheduled. You will be contacted by [facility] diagnostic imaging to make this appointment. Please call the nurse at [phone] if you have not been contacted in a timely manner to schedule.    Feel for IUD string after next menses. If unable to locate strings, make an appointment with Dr. Wiggins to verify IUD is in place.  Return to clinic in approximately 3-6 weeks for IUD check.  IUD should be removed or replaced in 5 years or removed sooner if desired.    Return for annual exam in 1 year with Alejandro Kee.

## 2018-06-06 NOTE — LETTER
Amber 15, 2018      Sheridan Roldan  [address] [place] [address] [address]  [facility] [address]        Dear Sheridan,       Thank you for coming to the [facility]. This letter is to inform you that your pap test was normal and your High Risk HPV test was negative. Please call the nurse at [phone] if you have questions pertaining to your results.          Sincerely,        Chioma Wiggins/ Esha MUNGUIA LPN

## 2018-06-07 DIAGNOSIS — Z00.00 ROUTINE GENERAL MEDICAL EXAMINATION AT A HEALTH CARE FACILITY: ICD-10-CM

## 2018-06-07 LAB
CHOLEST SERPL-MCNC: 144 MG/DL
ERYTHROCYTE [DISTWIDTH] IN BLOOD BY AUTOMATED COUNT: 12.2 % (ref 10–15)
EST. AVERAGE GLUCOSE BLD GHB EST-MCNC: 91 MG/DL
GLUCOSE SERPL-MCNC: 80 MG/DL (ref 70–99)
HBA1C MFR BLD: 4.8 % (ref 0–5.6)
HCT VFR BLD AUTO: 41.9 % (ref 35–47)
HDLC SERPL-MCNC: 53 MG/DL
HGB BLD-MCNC: 14.8 G/DL (ref 11.7–15.7)
LDLC SERPL CALC-MCNC: 84 MG/DL
MCH RBC QN AUTO: 30.7 PG (ref 26.5–33)
MCHC RBC AUTO-ENTMCNC: 35.3 G/DL (ref 31.5–36.5)
MCV RBC AUTO: 87 FL (ref 78–100)
NONHDLC SERPL-MCNC: 91 MG/DL
PLATELET # BLD AUTO: 194 10E9/L (ref 150–450)
RBC # BLD AUTO: 4.82 10E12/L (ref 3.8–5.2)
TRIGL SERPL-MCNC: 34 MG/DL
TSH SERPL DL<=0.005 MIU/L-ACNC: 1.95 MU/L (ref 0.4–4)
WBC # BLD AUTO: 6 10E9/L (ref 4–11)

## 2018-06-07 PROCEDURE — 84443 ASSAY THYROID STIM HORMONE: CPT | Performed by: OBSTETRICS & GYNECOLOGY

## 2018-06-07 PROCEDURE — 82947 ASSAY GLUCOSE BLOOD QUANT: CPT | Performed by: OBSTETRICS & GYNECOLOGY

## 2018-06-07 PROCEDURE — 83036 HEMOGLOBIN GLYCOSYLATED A1C: CPT | Performed by: OBSTETRICS & GYNECOLOGY

## 2018-06-07 PROCEDURE — 82306 VITAMIN D 25 HYDROXY: CPT | Mod: 90 | Performed by: OBSTETRICS & GYNECOLOGY

## 2018-06-07 PROCEDURE — 99000 SPECIMEN HANDLING OFFICE-LAB: CPT | Performed by: OBSTETRICS & GYNECOLOGY

## 2018-06-07 PROCEDURE — 80061 LIPID PANEL: CPT | Performed by: OBSTETRICS & GYNECOLOGY

## 2018-06-07 PROCEDURE — 85027 COMPLETE CBC AUTOMATED: CPT | Performed by: OBSTETRICS & GYNECOLOGY

## 2018-06-07 PROCEDURE — 36415 COLL VENOUS BLD VENIPUNCTURE: CPT | Performed by: OBSTETRICS & GYNECOLOGY

## 2018-06-07 ASSESSMENT — PATIENT HEALTH QUESTIONNAIRE - PHQ9: SUM OF ALL RESPONSES TO PHQ QUESTIONS 1-9: 2

## 2018-06-07 ASSESSMENT — ANXIETY QUESTIONNAIRES: GAD7 TOTAL SCORE: 0

## 2018-06-08 LAB — DEPRECATED CALCIDIOL+CALCIFEROL SERPL-MC: 40 UG/L (ref 20–75)

## 2018-06-12 LAB
COPATH REPORT: NORMAL
PAP: NORMAL

## 2018-06-13 LAB
FINAL DIAGNOSIS: NORMAL
HPV HR 12 DNA CVX QL NAA+PROBE: NEGATIVE
HPV16 DNA SPEC QL NAA+PROBE: NEGATIVE
HPV18 DNA SPEC QL NAA+PROBE: NEGATIVE
SPECIMEN DESCRIPTION: NORMAL
SPECIMEN SOURCE CVX/VAG CYTO: NORMAL

## 2018-06-27 ENCOUNTER — OFFICE VISIT (OUTPATIENT)
Dept: OBGYN | Facility: OTHER | Age: 38
End: 2018-06-27
Attending: ADVANCED PRACTICE MIDWIFE
Payer: COMMERCIAL

## 2018-06-27 VITALS
WEIGHT: 182 LBS | DIASTOLIC BLOOD PRESSURE: 64 MMHG | SYSTOLIC BLOOD PRESSURE: 102 MMHG | HEIGHT: 66 IN | HEART RATE: 64 BPM | OXYGEN SATURATION: 98 % | BODY MASS INDEX: 29.25 KG/M2

## 2018-06-27 DIAGNOSIS — Z30.431 ENCOUNTER FOR ROUTINE CHECKING OF INTRAUTERINE CONTRACEPTIVE DEVICE (IUD): Primary | ICD-10-CM

## 2018-06-27 PROCEDURE — 99213 OFFICE O/P EST LOW 20 MIN: CPT | Performed by: ADVANCED PRACTICE MIDWIFE

## 2018-06-27 ASSESSMENT — PAIN SCALES - GENERAL: PAINLEVEL: NO PAIN (0)

## 2018-06-27 NOTE — PROGRESS NOTES
"Sheridan Roldan is a 37 year old female here for contraception management.  Routine 3 week post IUD insertion follow up.        O:   /64  Pulse 64  Ht 5' 6\" (1.676 m)  Wt 182 lb (82.6 kg)  SpO2 98%  BMI 29.38 kg/m2   Pleasant without acute distress  Pelvic:  Vagina and vulva are normal;  no discharge is noted.    Cervix: normal without lesions. IUD strings visible   Uterus:  Mobile,  normal in size and shape without tenderness.  Adnexa: without masses or tenderness.      A:    Contraception follow up  Mirena IUD strings visualized    P:    Pelvic exam    Total visit time of greater than 15 minutes with 10 minutes spent face to face counseling this patient IUD warning signs of pain or excessive bleeding, when to seek medical attention.    Alejandro Kee, REYNA, CNM    "

## 2018-06-27 NOTE — NURSING NOTE
"Chief Complaint   Patient presents with     Follow Up For     IUD check       Initial /64  Pulse 64  Ht 5' 6\" (1.676 m)  Wt 182 lb (82.6 kg)  SpO2 98%  BMI 29.38 kg/m2 Estimated body mass index is 29.38 kg/(m^2) as calculated from the following:    Height as of this encounter: 5' 6\" (1.676 m).    Weight as of this encounter: 182 lb (82.6 kg).  Medication Reconciliation: complete    Anne-Marie Blair LPN    "

## 2018-06-27 NOTE — MR AVS SNAPSHOT
"              After Visit Summary   6/27/2018    Sheridan Roldan    MRN: [id number]           Patient Information     Date Of Birth          1980        Visit Information        Provider Department      6/27/2018 8:30 AM Alejandro Kee APRN CNM [facility]        Today's Diagnoses     Encounter for routine checking of intrauterine contraceptive device (IUD)    -  1      Care Instructions    Return to office for well woman visit and as needed.    Thank you for allowing Alejandro ORELLANA CNM and our OB team to participate in your care.  If you have a scheduling or an appointment question please contact [facility] Unit Coordinator at their direct line [phone].   ALL nursing questions or concerns can be directed to your OB nurse at: [phone] Honorio Nix/Debra               Follow-ups after your visit        Your next 10 appointments already scheduled     Jun 28, 2018  1:30 PM CDT   (Arrive by 1:15 PM)   MA SCREEN IMPLANTS BILATERAL W/ EUGENIA with HCMA1   [facility] ([facility] [place] )    [address] [facility] [address]   [phone]           Do not use any powder, lotion or deodorant under your arms or on your breast. If you do, we will ask you to remove it before your exam.  Wear comfortable, two-piece clothing.  If you have any allergies, tell your care team.  Bring any previous mammograms from other facilities or have them mailed to the breast center.  Three-dimensional (3D) mammograms are available at [facility] locations in [facility], [facility], [facility], [place], [place], and [place]. Health locations include [facility] in [place]. Benefits of 3D mammograms include: - Improved rate of cancer detection - Decreases your chance of having to go back for more tests, which means fewer: - \"False-positive\" results (This means that there is an abnormal area but it isn't cancer.) - " Invasive testing procedures, such as a biopsy or surgery - Can provide clearer images of the breast if you have dense breast tissue. 3D mammography is an optional exam that anyone can have with a 2D mammogram. It doesn't replace or take the place of a 2D mammogram. 2D mammograms remain an effective screening test for all women.  Not all insurance companies cover the cost of a 3D mammogram. Check with your insurance.            José Luis 10, 2019 10:00 AM CDT   (Arrive by 9:45 AM)   PHYSICAL with REYNA Ayala CNM   [facility] ([facility] )    [address]  [place] [address]   [phone]              Who to contact     If you have questions or need follow up information about today's clinic visit or your schedule please contact [facility] directly at [phone].  Normal or non-critical lab and imaging results will be communicated to you by MyChart, letter or phone within 4 business days after the clinic has received the results. If you do not hear from us within 7 days, please contact the clinic through [facility] or phone. If you have a critical or abnormal lab result, we will notify you by phone as soon as possible.  Submit refill requests through [employer] or call your pharmacy and they will forward the refill request to us. Please allow 3 business days for your refill to be completed.          Additional Information About Your Visit        MyChart Information     [employer] gives you secure access to your electronic health record. If you see a primary care provider, you can also send messages to your care team and make appointments. If you have questions, please call your primary care clinic.  If you do not have a primary care provider, please call [phone] and they will assist you.        Care EveryWhere ID     This is your Care EveryWhere ID. This could be used by other organizations to access your [facility] medical records  [id number]        Your Vitals  "Were     Pulse Height Pulse Oximetry BMI (Body Mass Index)          64 5' 6\" (1.676 m) 98% 29.38 kg/m2         Blood Pressure from Last 3 Encounters:   06/27/18 102/64   06/06/18 92/62   11/22/17 113/76    Weight from Last 3 Encounters:   06/27/18 182 lb (82.6 kg)   06/06/18 182 lb (82.6 kg)   11/22/17 178 lb (80.7 kg)              Today, you had the following     No orders found for display       Primary Care Provider Office Phone # Fax #    SARAH Bond [phone]       [address] [address]        Equal Access to Services     AMBROSIO ROBERTSON : Hadii charan francoo Sonancy, waaxda luqadaha, qaybta kaalmada adeegyada, lizz herring . So [facility] [phone].    ATENCIÓN: Si habla español, tiene a schulz disposición servicios gratuitos de asistencia lingüística. Ll[facility]ro[facility] Center [phone].    We comply with applicable federal civil rights laws and [place] laws. We do not discriminate on the basis of race, color, national origin, age, disability, sex, sexual orientation, or gender identity.            Thank you!     Thank you for choosing [facility]  for your care. Our goal is always to provide you with excellent care. Hearing back from our patients is one way we can continue to improve our services. Please take a few minutes to complete the written survey that you may receive in the mail after your visit with us. Thank you!             Your Updated Medication List - Protect others around you: Learn how to safely use, store and throw away your medicines at www.disposemymeds.org.          This list is accurate as of 6/27/18  1:26 PM.  Always use your most recent med list.                   Brand Name Dispense Instructions for use Diagnosis    * acyclovir 5 % cream    ZOVIRAX    5 g    Apply topically 5 times daily    HSV (herpes simplex virus) infection       * acyclovir 5 % cream    ZOVIRAX    5 g    Apply topically 5 times daily    HSV " (herpes simplex virus) infection       escitalopram 10 MG tablet    LEXAPRO    90 tablet    Take 1 tablet (10 mg) by mouth daily    Major depressive disorder, single episode, moderate (H)       ibuprofen 800 MG tablet    ADVIL/MOTRIN    40 tablet    Take 1 tablet (800 mg) by mouth every 8 hours as needed for moderate pain    Post-operative state       levonorgestrel 20 MCG/24HR IUD    MIRENA (52 MG)    1 each    1 each (20 mcg) by Intrauterine route continuous    Family planning       levothyroxine 75 MCG tablet    SYNTHROID/LEVOTHROID    90 tablet    Take 1 tablet (75 mcg) by mouth daily    Goiter       VITAMIN D (CHOLECALCIFEROL) PO      Take 6,000 Units by mouth daily        * Notice:  This list has 2 medication(s) that are the same as other medications prescribed for you. Read the directions carefully, and ask your doctor or other care provider to review them with you.

## 2018-06-27 NOTE — PATIENT INSTRUCTIONS
Return to office for well woman visit and as needed.    Thank you for allowing Alejandro ORELLANA CNM and our OB team to participate in your care.  If you have a scheduling or an appointment question please contact [facility] Unit Coordinator at their direct line [phone].   ALL nursing questions or concerns can be directed to your OB nurse at: [phone] Honorio Nix/Debra

## 2018-06-28 ENCOUNTER — RADIANT APPOINTMENT (OUTPATIENT)
Dept: MAMMOGRAPHY | Facility: OTHER | Age: 38
End: 2018-06-28
Attending: OBSTETRICS & GYNECOLOGY
Payer: COMMERCIAL

## 2018-06-28 DIAGNOSIS — Z12.31 VISIT FOR SCREENING MAMMOGRAM: ICD-10-CM

## 2018-06-28 DIAGNOSIS — Z00.00 ROUTINE GENERAL MEDICAL EXAMINATION AT A HEALTH CARE FACILITY: ICD-10-CM

## 2018-06-28 PROCEDURE — 77067 SCR MAMMO BI INCL CAD: CPT | Mod: TC

## 2018-06-28 PROCEDURE — 77063 BREAST TOMOSYNTHESIS BI: CPT | Mod: TC

## 2018-07-13 ENCOUNTER — ALLIED HEALTH/NURSE VISIT (OUTPATIENT)
Dept: OBGYN | Facility: OTHER | Age: 38
End: 2018-07-13
Attending: PHYSICIAN ASSISTANT
Payer: COMMERCIAL

## 2018-07-13 DIAGNOSIS — N89.8 VAGINAL DISCHARGE: Primary | ICD-10-CM

## 2018-07-13 LAB
SPECIMEN SOURCE: ABNORMAL
WET PREP SPEC: ABNORMAL

## 2018-07-13 PROCEDURE — 87210 SMEAR WET MOUNT SALINE/INK: CPT | Performed by: PHYSICIAN ASSISTANT

## 2018-07-13 RX ORDER — METRONIDAZOLE 500 MG/1
2000 TABLET ORAL ONCE
Qty: 4 TABLET | Refills: 0 | Status: SHIPPED | OUTPATIENT
Start: 2018-07-13 | End: 2018-07-13

## 2018-07-13 NOTE — MR AVS SNAPSHOT
After Visit Summary   7/13/2018    Sheridan Roldan    MRN: [id number]           Patient Information     Date Of Birth          1980        Visit Information        Provider Department      7/13/2018 8:30 AM HC OB/GYN NURSE [facility] [place]        Today's Diagnoses     Vaginal discharge    -  1       Follow-ups after your visit        Your next 10 appointments already scheduled     Jul 13, 2018  8:30 AM CDT   (Arrive by 8:15 AM)   Nurse Only with HC OB/GYN NURSE   [facility] [place] ([facility] [place] )    [address]  [place] [address]   [phone]            José Luis 10, 2019 10:00 AM CDT   (Arrive by 9:45 AM)   PHYSICAL with REYNA Ayala CNM   [facility] ([facility] [place] )    [address]  [place] [address]   [phone]              Who to contact     If you have questions or need follow up information about today's clinic visit or your schedule please contact [facility] directly at [phone].  Normal or non-critical lab and imaging results will be communicated to you by [employer], letter or phone within 4 business days after the clinic has received the results. If you do not hear from us within 7 days, please contact the clinic through [employer] or phone. If you have a critical or abnormal lab result, we will notify you by phone as soon as possible.  Submit refill requests through [email or web address] or call your pharmacy and they will forward the refill request to us. Please allow 3 business days for your refill to be completed.          Additional Information About Your Visit        MyChart Information     [email or web address] gives you secure access to your electronic health record. If you see a primary care provider, you can also send messages to your care team and make appointments. If you have questions, please call your primary care clinic.  If you do not have a primary care provider, please call [phone] and they  will assist you.        Care EveryWhere ID     This is your Care EveryWhere ID. This could be used by other organizations to access your [place] medical records  [id number]         Blood Pressure from Last 3 Encounters:   06/27/18 102/64   06/06/18 92/62   11/22/17 113/76    Weight from Last 3 Encounters:   06/27/18 182 lb (82.6 kg)   06/06/18 182 lb (82.6 kg)   11/22/17 178 lb (80.7 kg)              We Performed the Following     Wet prep        Primary Care Provider Office Phone # Fax #    SARAH Bond [phone]       [address] [place] [address]        Equal Access to Services     AMBROSIO ROBERTSON : Hadii charan deleon hadasho Sobisiali, waaxda luqadaha, qaybta kaalmada adeegyada, lizz herring . So [facility] [phone].    ATENCIÓN: Si habla español, tiene a schulz disposición servicios gratuitos de asistencia lingüística. [facility] [phone].    We comply with applicable federal civil rights laws and [place] laws. We do not discriminate on the basis of race, color, national origin, age, disability, sex, sexual orientation, or gender identity.            Thank you!     Thank you for choosing [facility]  for your care. Our goal is always to provide you with excellent care. Hearing back from our patients is one way we can continue to improve our services. Please take a few minutes to complete the written survey that you may receive in the mail after your visit with us. Thank you!             Your Updated Medication List - Protect others around you: Learn how to safely use, store and throw away your medicines at www.disposemymeds.org.          This list is accurate as of 7/13/18  8:30 AM.  Always use your most recent med list.                   Brand Name Dispense Instructions for use Diagnosis    * acyclovir 5 % cream    ZOVIRAX    5 g    Apply topically 5 times daily    HSV (herpes simplex virus) infection       * acyclovir 5 % cream    ZOVIRAX     5 g    Apply topically 5 times daily    HSV (herpes simplex virus) infection       escitalopram 10 MG tablet    LEXAPRO    90 tablet    Take 1 tablet (10 mg) by mouth daily    Major depressive disorder, single episode, moderate (H)       ibuprofen 800 MG tablet    ADVIL/MOTRIN    40 tablet    Take 1 tablet (800 mg) by mouth every 8 hours as needed for moderate pain    Post-operative state       levonorgestrel 20 MCG/24HR IUD    MIRENA (52 MG)    1 each    1 each (20 mcg) by Intrauterine route continuous    Family planning       levothyroxine 75 MCG tablet    SYNTHROID/LEVOTHROID    90 tablet    Take 1 tablet (75 mcg) by mouth daily    Goiter       VITAMIN D (CHOLECALCIFEROL) PO      Take 6,000 Units by mouth daily        * Notice:  This list has 2 medication(s) that are the same as other medications prescribed for you. Read the directions carefully, and ask your doctor or other care provider to review them with you.

## 2018-08-16 ENCOUNTER — HOSPITAL ENCOUNTER (OUTPATIENT)
Dept: GENERAL RADIOLOGY | Facility: HOSPITAL | Age: 38
Discharge: HOME | End: 2018-08-16
Attending: PODIATRIST | Admitting: PODIATRIST
Payer: COMMERCIAL

## 2018-08-16 ENCOUNTER — OFFICE VISIT (OUTPATIENT)
Dept: PODIATRY | Facility: OTHER | Age: 38
End: 2018-08-16
Attending: PODIATRIST
Payer: COMMERCIAL

## 2018-08-16 VITALS — HEART RATE: 59 BPM | SYSTOLIC BLOOD PRESSURE: 112 MMHG | DIASTOLIC BLOOD PRESSURE: 75 MMHG | TEMPERATURE: 98.7 F

## 2018-08-16 DIAGNOSIS — M79.671 FOOT PAIN, RIGHT: ICD-10-CM

## 2018-08-16 DIAGNOSIS — M20.21 HALLUX RIGIDUS OF RIGHT FOOT: Primary | ICD-10-CM

## 2018-08-16 DIAGNOSIS — M20.21 HALLUX RIGIDUS OF RIGHT FOOT: ICD-10-CM

## 2018-08-16 PROCEDURE — 73630 X-RAY EXAM OF FOOT: CPT | Mod: TC,RT

## 2018-08-16 PROCEDURE — 99203 OFFICE O/P NEW LOW 30 MIN: CPT | Performed by: PODIATRIST

## 2018-08-16 ASSESSMENT — PAIN SCALES - GENERAL: PAINLEVEL: NO PAIN (0)

## 2018-08-16 NOTE — PROGRESS NOTES
"Chief complaint: Patient presents with:  Bunion      History of Present Illness: This 37 year old female is seen by podiatry for evaluation and suggestions of management of a LEFT foot. She has a very painful bump on the RIGHT foot. She has too much pain from the rubbing of any shoe that has a seam on it. While walking, the joint also \"cracks and pops all the time.\" The toe has been this painful for about three years. 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 Pain is not described as sharp, but rather a constant pain / annoyance that she always feels while going on walks and while working. She is inquiring about a treatment that would get rid of the pain as it is affecting her daily activities of work and exercise. No further pedal complaints today.      Patient Active Problem List   Diagnosis     Family planning     Encounter for routine gynecological examination     Fatigue     Mastodynia     Goiter     Chronic tonsillitis     Tonsillar hypertrophy     Papanicolaou smear of cervix with low grade squamous intraepithelial lesion (LGSIL)     High risk HPV infection     RC II (cervical intraepithelial neoplasia II)     NO SHOW     Papanicolaou smear of cervix with atypical squamous cells of undetermined significance (ASC-US)     History of bilateral fallopian tube excision       Past Surgical History:   Procedure Laterality Date     ABDOMINOPLASTY       CONIZATION LEEP N/A 8/1/2016    Procedure: CONIZATION LEEP;  Surgeon: Chioma Wiggins MD;  Location: [facility] OR     LAPAROSCOPIC SALPINGECTOMY Bilateral 11/22/2017    Procedure: LAPAROSCOPIC SALPINGECTOMY;  LAPAROSCOPIC BILATERAL SALPINGECTOMY;  Surgeon: Kalin Hernandez MD;  Location: [facility] OR     MAMMOPLASTY AUGMENTATION BILATERAL         Current Outpatient Prescriptions   Medication     acyclovir (ZOVIRAX) 5 % cream     acyclovir (ZOVIRAX) 5 % cream     escitalopram (LEXAPRO) 10 MG tablet     ibuprofen " "(ADVIL/MOTRIN) 800 MG tablet     levonorgestrel (MIRENA, 52 MG,) 20 MCG/24HR IUD     levothyroxine (SYNTHROID/LEVOTHROID) 75 MCG tablet     VITAMIN D, CHOLECALCIFEROL, PO     No current facility-administered medications for this visit.           Allergies   Allergen Reactions     Erythromycin Nausea and Vomiting     Erythromycin base     Penicillins Rash       EXAM  Constitutional: healthy, alert and no distress    Psychiatric: mentation appears normal and affect normal/bright    VASCULAR:  -Dorsalis pedis pulse +2/4 b/l  -Posterior tibial pulse +2/4 b/l  -Hair growth present   NEURO:  -Light touch sensation intact to b/l plantar forefoot  DERM:  -Skin temperature, texture and turgor WNL b/l  MSK:  -Decreased dorsiflexion of b/l 1st MTPJ (R>L) with RIGHT dorsiflexion at 10 degrees with forefoot loading  -Pain with end ROM dorsiflexion with RIGHT 1st MTPJ and crepitus with dorsiflexion/plantarflexion ROM  -Dorsal exostosis on dorsum of 1st MTPJ, RIGHT  -Mild medial prominence of metatarsal head with minimal lateral deviation of hallux  ===================================================================  ASSESSMENT:  (M20.21) Hallux rigidus of right foot  (primary encounter diagnosis)  Plan: XR Foot 3 Views Standing Right  (M79.671) Foot pain, right    PLAN:  -Patient evaluated and examined. Treatment options discussed with no educational barriers noted.  -Conservative measures at this point would consist of a Gordon's extension in an insert to decrease micro motion of the joint to decrease pain. An injection into this joint will unlikely cause any long-term pain relief so this was declined by the patient. She states she is looking for a more definitive treatment plan.  -Ordered 3 WB foot views of RIGHT -- will discuss results at next clinic appointment.  -Discussed bunionectomy vs. Cheilectomy (\"clean-up\") +/- Cartiva implant vs. Fusion based on radiographs and patient goals. Reviewed risks and benefits of each " procedure. Will discuss surgical choice at next appointment.  RTC 1 week for pre-operative planning and H&P      Katty Schroeder DPM

## 2018-08-16 NOTE — NURSING NOTE
"Chief Complaint   Patient presents with     Bunion       Initial /75 (BP Location: Right arm, Patient Position: Sitting)  Pulse 59  Temp 98.7  F (37.1  C) (Tympanic) Estimated body mass index is 29.38 kg/(m^2) as calculated from the following:    Height as of 6/27/18: 5' 6\" (1.676 m).    Weight as of 6/27/18: 182 lb (82.6 kg).  Medication Reconciliation: complete    Erika Thibodeaux LPN  "

## 2018-08-16 NOTE — MR AVS SNAPSHOT
After Visit Summary   8/16/2018    Sheridan Roldan    MRN: [id number]           Patient Information     Date Of Birth          1980        Visit Information        Provider Department      8/16/2018 3:15 PM Katty Schroeder DPM [facility]        Today's Diagnoses     Hallux rigidus of right foot    -  1    Foot pain, right           Follow-ups after your visit        Your next 10 appointments already scheduled     Aug 22, 2018  9:00 AM CDT   (Arrive by 8:45 AM)   [facility]    [address] [place] [address]   [phone]           Bring a current list of meds and any records pertaining to this visit. For Physicals, please bring immunization records and any forms needing to be filled out. Please arrive 10 minutes early to complete paperwork.            Aug 23, 2018  8:15 AM CDT   (Arrive by 8:00 AM)   Return Visit with [facility]eder DPM   [facility]    [address] [place] [id number]   [phone]            José Luis 10, 2019 10:00 AM CDT   (Arrive by 9:45 AM)   [facility]    [address] [facility] [address]   [phone]              Who to contact     If you have questions or need follow up information about today's clinic visit or your schedule please contact [facility] directly at [phone].  Normal or non-critical lab and imaging results will be communicated to you by MyChart, letter or phone within 4 business days after the clinic has received the results. If you do not hear from us within 7 days, please contact the clinic through MyChart or phone. If you have a critical or abnormal lab result, we will notify you by phone as soon as possible.  Submit refill requests through [employer] or call your  pharmacy and they will forward the refill request to us. Please allow 3 business days for your refill to be completed.          Additional Information About Your Visit        MyChart Information     [employer] gives you secure access to your electronic health record. If you see a primary care provider, you can also send messages to your care team and make appointments. If you have questions, please call your primary care clinic.  If you do not have a primary care provider, please call [phone] and they will assist you.        Care EveryWhere ID     This is your Care EveryWhere ID. This could be used by other organizations to access your [place] medical records  [id number]        Your Vitals Were     Pulse Temperature                59 98.7  F (37.1  C) (Tympanic)           Blood Pressure from Last 3 Encounters:   08/16/18 112/75   06/27/18 102/64   06/06/18 92/62    Weight from Last 3 Encounters:   06/27/18 182 lb (82.6 kg)   06/06/18 182 lb (82.6 kg)   11/22/17 178 lb (80.7 kg)               Primary Care Provider Office Phone # Fax #    SARAH Bond [phone]       [address] [place] [address]        Equal Access to Services     AMBROSIO ROBERTSON : Hadii charan deleon hadasho Soomaali, waaxda luqadaha, qaybta kaalmada adeegyada, lizz gay. So [facility] [phone].    ATENCIÓN: Si habla español, tiene a schulz disposición servicios gratuitos de asistencia lingüística. [facility] [phone].    We comply with applicable federal civil rights laws and [place] laws. We do not discriminate on the basis of race, color, national origin, age, disability, sex, sexual orientation, or gender identity.            Thank you!     Thank you for choosing [facility]  for your care. Our goal is always to provide you with excellent care. Hearing back from our patients is one way we can continue to improve our services. Please take a few minutes to complete the  written survey that you may receive in the mail after your visit with us. Thank you!             Your Updated Medication List - Protect others around you: Learn how to safely use, store and throw away your medicines at www.disposemymeds.org.          This list is accurate as of 8/16/18 11:59 PM.  Always use your most recent med list.                   Brand Name Dispense Instructions for use Diagnosis    * acyclovir 5 % cream    ZOVIRAX    5 g    Apply topically 5 times daily    HSV (herpes simplex virus) infection       * acyclovir 5 % cream    ZOVIRAX    5 g    Apply topically 5 times daily    HSV (herpes simplex virus) infection       escitalopram 10 MG tablet    LEXAPRO    90 tablet    Take 1 tablet (10 mg) by mouth daily    Major depressive disorder, single episode, moderate (H)       ibuprofen 800 MG tablet    ADVIL/MOTRIN    40 tablet    Take 1 tablet (800 mg) by mouth every 8 hours as needed for moderate pain    Post-operative state       levonorgestrel 20 MCG/24HR IUD    MIRENA (52 MG)    1 each    1 each (20 mcg) by Intrauterine route continuous    Family planning       levothyroxine 75 MCG tablet    SYNTHROID/LEVOTHROID    90 tablet    Take 1 tablet (75 mcg) by mouth daily    Goiter       VITAMIN D (CHOLECALCIFEROL) PO      Take 6,000 Units by mouth daily        * Notice:  This list has 2 medication(s) that are the same as other medications prescribed for you. Read the directions carefully, and ask your doctor or other care provider to review them with you.

## 2018-08-22 ENCOUNTER — TELEPHONE (OUTPATIENT)
Dept: PODIATRY | Facility: OTHER | Age: 38
End: 2018-08-22

## 2018-08-22 ENCOUNTER — RADIANT APPOINTMENT (OUTPATIENT)
Dept: GENERAL RADIOLOGY | Facility: OTHER | Age: 38
End: 2018-08-22
Attending: PODIATRIST
Payer: COMMERCIAL

## 2018-08-22 ENCOUNTER — OFFICE VISIT (OUTPATIENT)
Dept: OBGYN | Facility: OTHER | Age: 38
End: 2018-08-22
Attending: ADVANCED PRACTICE MIDWIFE
Payer: COMMERCIAL

## 2018-08-22 VITALS
HEART RATE: 64 BPM | HEIGHT: 66 IN | SYSTOLIC BLOOD PRESSURE: 106 MMHG | BODY MASS INDEX: 29.25 KG/M2 | DIASTOLIC BLOOD PRESSURE: 68 MMHG | WEIGHT: 182 LBS

## 2018-08-22 DIAGNOSIS — M20.21 HALLUX RIGIDUS OF RIGHT FOOT: Primary | ICD-10-CM

## 2018-08-22 DIAGNOSIS — R07.0 THROAT PAIN: ICD-10-CM

## 2018-08-22 DIAGNOSIS — M20.21 HALLUX RIGIDUS OF RIGHT FOOT: ICD-10-CM

## 2018-08-22 DIAGNOSIS — M25.50 MULTIPLE JOINT PAIN: Primary | ICD-10-CM

## 2018-08-22 PROCEDURE — 73630 X-RAY EXAM OF FOOT: CPT | Mod: TC

## 2018-08-22 PROCEDURE — 99202 OFFICE O/P NEW SF 15 MIN: CPT | Performed by: ADVANCED PRACTICE MIDWIFE

## 2018-08-22 ASSESSMENT — PAIN SCALES - GENERAL: PAINLEVEL: MILD PAIN (2)

## 2018-08-22 NOTE — NURSING NOTE
"Chief Complaint   Patient presents with     RECHECK     back/neck/hip pain and joint pain       Initial /68  Pulse 64  Ht 5' 6\" (1.676 m)  Wt 182 lb (82.6 kg)  BMI 29.38 kg/m2 Estimated body mass index is 29.38 kg/(m^2) as calculated from the following:    Height as of this encounter: 5' 6\" (1.676 m).    Weight as of this encounter: 182 lb (82.6 kg).  Medication Reconciliation: complete    Debra Dave LPN    "
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
## 2018-08-22 NOTE — PATIENT INSTRUCTIONS
Return to office as needed.    Thank you for allowing Alejandro ORELLANA CNM and our OB team to participate in your care.  If you have a scheduling or an appointment question please contact [facility] Unit Coordinator at their direct line [phone].   ALL nursing questions or concerns can be directed to your OB nurse at: [phone] Honorio Nix/Debra

## 2018-08-22 NOTE — MR AVS SNAPSHOT
After Visit Summary   8/22/2018    Sheridan Roldan    MRN: [id number]           Patient Information     Date Of Birth          1980        Visit Information        Provider Department      8/22/2018 9:00 AM Alejandro Kee APRN CNM [facility]        Care Instructions    Return to office as needed.    Thank you for allowing Alejandro ORELLANA CNM and our OB team to participate in your care.  If you have a scheduling or an appointment question please contact [facility] Coordinator at their direct line [phone].   ALL nursing questions or concerns can be directed to your OB nurse at: [phone] - Dinah/Debra               Follow-ups after your visit        Your next 10 appointments already scheduled     Aug 23, 2018  8:15 AM CDT   (Arrive by 8:00 AM)   Return Visit with Katty Schroeder DPM   [facility]    [address] [place], [place] [address] [place] [address] [phone]            José Luis 10, 2019 10:00 AM CDT   (Arrive by 9:45 AM)   PHYSICAL with REYNA Ayala CNM   [facility] ([facility] )    [address] [place] [address] [id number] [phone]              Who to contact     If you have questions or need follow up information about today's clinic visit or your schedule please contact [facility] directly at [phone].  Normal or non-critical lab and imaging results will be communicated to you by MyChart, letter or phone within 4 business days after the clinic has received the results. If you do not hear from us within 7 days, please contact the clinic through MyChart or phone. If you have a critical or abnormal lab result, we will notify you by phone as soon as possible.  Submit refill requests through [employer]Guest or call your pharmacy and they will forward the refill request to us. Please allow 3 business days for your refill to be completed.          Additional  "Information About Your Visit        MyChart Information     Sow[employer] gives you secure access to your electronic health record. If you see a primary care provider, you can also send messages to your care team and make appointments. If you have questions, please call your primary care clinic.  If you do not have a primary care provider, please call [phone] and they will assist you.        Care EveryWhere ID     This is your Care EveryWhere ID. This could be used by other organizations to access your [place] medical records  [id number]        Your Vitals Were     Pulse Height BMI (Body Mass Index)             64 5' 6\" (1.676 m) 29.38 kg/m2          Blood Pressure from Last 3 Encounters:   08/22/18 106/68   08/16/18 112/75   06/27/18 102/64    Weight from Last 3 Encounters:   08/22/18 182 lb (82.6 kg)   06/27/18 182 lb (82.6 kg)   06/06/18 182 lb (82.6 kg)              Today, you had the following     No orders found for display       Primary Care Provider Office Phone # Fax #    SARAH Bond [phone]       [address] [place] [address]        Equal Access to Services     AMBROSIO ROBERTSON AH: Hadii charan francoo Sobisiali, waaxda luqadaha, qaybta kaalmada adeegyada, lizz gay. So [facility] [phone].    ATENCIÓN: Si habla español, tiene a schulz disposición servicios gratuitos de asistencia lingüística. Mathieu al [phone].    We comply with applicable federal civil rights laws and [place] laws. We do not discriminate on the basis of race, color, national origin, age, disability, sex, sexual orientation, or gender identity.            Thank you!     Thank you for choosing [facility]  for your care. Our goal is always to provide you with excellent care. Hearing back from our patients is one way we can continue to improve our services. Please take a few minutes to complete the written survey that you may receive in the mail after your " visit with us. Thank you!             Your Updated Medication List - Protect others around you: Learn how to safely use, store and throw away your medicines at www.disposemymeds.org.          This list is accurate as of 8/22/18  5:32 PM.  Always use your most recent med list.                   Brand Name Dispense Instructions for use Diagnosis    acyclovir 5 % cream    ZOVIRAX    5 g    Apply topically 5 times daily    HSV (herpes simplex virus) infection       escitalopram 10 MG tablet    LEXAPRO    90 tablet    Take 1 tablet (10 mg) by mouth daily    Major depressive disorder, single episode, moderate (H)       ibuprofen 800 MG tablet    ADVIL/MOTRIN    40 tablet    Take 1 tablet (800 mg) by mouth every 8 hours as needed for moderate pain    Post-operative state       levonorgestrel 20 MCG/24HR IUD    MIRENA (52 MG)    1 each    1 each (20 mcg) by Intrauterine route continuous    Family planning       levothyroxine 75 MCG tablet    SYNTHROID/LEVOTHROID    90 tablet    Take 1 tablet (75 mcg) by mouth daily    Goiter       VITAMIN D (CHOLECALCIFEROL) PO      Take 6,000 Units by mouth daily

## 2018-08-23 ENCOUNTER — OFFICE VISIT (OUTPATIENT)
Dept: PODIATRY | Facility: OTHER | Age: 38
End: 2018-08-23
Attending: PHYSICIAN ASSISTANT
Payer: COMMERCIAL

## 2018-08-23 VITALS — HEART RATE: 62 BPM | SYSTOLIC BLOOD PRESSURE: 110 MMHG | TEMPERATURE: 97.6 F | DIASTOLIC BLOOD PRESSURE: 72 MMHG

## 2018-08-23 DIAGNOSIS — M20.21 HALLUX RIGIDUS OF RIGHT FOOT: Primary | ICD-10-CM

## 2018-08-23 PROCEDURE — 99213 OFFICE O/P EST LOW 20 MIN: CPT | Performed by: PODIATRIST

## 2018-08-23 ASSESSMENT — PAIN SCALES - GENERAL: PAINLEVEL: MILD PAIN (2)

## 2018-08-23 NOTE — NURSING NOTE
"Chief Complaint   Patient presents with     Musculoskeletal Problem     bunion right foot       Initial /72 (BP Location: Right arm, Patient Position: Sitting, Cuff Size: Adult Regular)  Pulse 62  Temp 97.6  F (36.4  C) (Tympanic) Estimated body mass index is 29.38 kg/(m^2) as calculated from the following:    Height as of 8/22/18: 5' 6\" (1.676 m).    Weight as of 8/22/18: 182 lb (82.6 kg).  Medication Reconciliation: complete    Erika Thibodeaux LPN  "

## 2018-08-23 NOTE — MR AVS SNAPSHOT
After Visit Summary   8/23/2018    Sheridan Roldan    MRN: [id number]           Patient Information     Date Of Birth          1980        Visit Information        Provider Department      8/23/2018 8:15 AM Katty Schroeder DPM [facility]        Today's Diagnoses     Hallux rigidus of right foot    -  1       Follow-ups after your visit        Follow-up notes from your care team     Return in about 7 weeks (around 10/9/2018).      Your next 10 appointments already scheduled     Oct 09, 2018  8:15 AM CDT   (Arrive by 8:00 AM)   Pre-Op physical with Katty Schroeder DPM   [facility] ([facility] [place] )    [address] [place] [address]  [place] [address] [phone]            José Luis 10, 2019 10:00 AM CDT   (Arrive by 9:45 AM)   PHYSICAL with REYNA Ayala CNM   [facility] ([facility] [place] )    [address] [place], [place] [address] [place] [address]   [phone]              Who to contact     If you have questions or need follow up information about today's clinic visit or your schedule please contact [facility] directly at [phone].  Normal or non-critical lab and imaging results will be communicated to you by spotdockhart, letter or phone within 4 business days after the clinic has received the results. If you do not hear from us within 7 days, please contact the clinic through spotdockhart or phone. If you have a critical or abnormal lab result, we will notify you by phone as soon as possible.  Submit refill requests through [employer] or call your pharmacy and they will forward the refill request to us. Please allow 3 business days for your refill to be completed.          Additional Information About Your Visit        spotdockhart Information     [employer] gives you secure access to your electronic health record. If you see a primary care provider, you can also send messages to your care team and make appointments. If you have  questions, please call your primary care clinic.  If you do not have a primary care provider, please call [phone] and they will assist you.        Care EveryWhere ID     This is your Care EveryWhere ID. This could be used by other organizations to access your [place] medical records  [id number]        Your Vitals Were     Pulse Temperature                62 97.6  F (36.4  C) (Tympanic)           Blood Pressure from Last 3 Encounters:   08/23/18 110/72   08/22/18 106/68   08/16/18 112/75    Weight from Last 3 Encounters:   08/22/18 182 lb (82.6 kg)   06/27/18 182 lb (82.6 kg)   06/06/18 182 lb (82.6 kg)              Today, you had the following     No orders found for display       Primary Care Provider Office Phone # Fax #    SARAH Bond [phone]       [address] [place], [place] [id number]        Equal Access to Services     [place] [facility] : Hadii aad ku hadasho Soomaali, waaxda luqadaha, qaybta kaalmada adeegyada, waxay venicein haynaten jennifer herring . So [facility] [phone].    ATENCIÓN: Si habla español, tiene a schulz disposición servicios gratuitos de asistencia lingüística. Llame al [phone].    We comply with applicable federal civil rights laws and [place] laws. We do not discriminate on the basis of race, color, national origin, age, disability, sex, sexual orientation, or gender identity.            Thank you!     Thank you for choosing [facility] [place]  for your care. Our goal is always to provide you with excellent care. Hearing back from our patients is one way we can continue to improve our services. Please take a few minutes to complete the written survey that you may receive in the mail after your visit with us. Thank you!             Your Updated Medication List - Protect others around you: Learn how to safely use, store and throw away your medicines at www.disposemymeds.org.          This list is accurate as of 8/23/18  9:19 AM.  Always use your  most recent med list.                   Brand Name Dispense Instructions for use Diagnosis    acyclovir 5 % cream    ZOVIRAX    5 g    Apply topically 5 times daily    HSV (herpes simplex virus) infection       escitalopram 10 MG tablet    LEXAPRO    90 tablet    Take 1 tablet (10 mg) by mouth daily    Major depressive disorder, single episode, moderate (H)       ibuprofen 800 MG tablet    ADVIL/MOTRIN    40 tablet    Take 1 tablet (800 mg) by mouth every 8 hours as needed for moderate pain    Post-operative state       levonorgestrel 20 MCG/24HR IUD    MIRENA (52 MG)    1 each    1 each (20 mcg) by Intrauterine route continuous    Family planning       levothyroxine 75 MCG tablet    SYNTHROID/LEVOTHROID    90 tablet    Take 1 tablet (75 mcg) by mouth daily    Goiter       VITAMIN D (CHOLECALCIFEROL) PO      Take 6,000 Units by mouth daily

## 2018-08-23 NOTE — PROGRESS NOTES
CC: RIGHT 1st MTPJ pain    HPI: This 37 year old female is seen for a follow-up regarding evaluation and suggestions of management of a RIGHT painful 1st MTPJ. She has had radiographs taken since her last visit and she was here for a pre-op appointment today, but she decided to wait until October to consider surgery. She wants to discuss her surgical options today, how long she will be off her feet, and if she needs surgery. As stated at her last appointment, her joint cracks when she walks and can be irritating. She doesn't have constant pain but often gets a sharp pain when she suddenly dorsiflexes her hallux. This has been an ongoing problem that limits her daily and has not improved. She is unable to walk a day in sandals or to wear high heels at any time because of the pain. No further pedal complaints today.    There were no vitals taken for this visit.    Patient Active Problem List   Diagnosis     Family planning     Encounter for routine gynecological examination     Fatigue     Mastodynia     Goiter     Chronic tonsillitis     Tonsillar hypertrophy     Papanicolaou smear of cervix with low grade squamous intraepithelial lesion (LGSIL)     High risk HPV infection     RC II (cervical intraepithelial neoplasia II)     NO SHOW     Papanicolaou smear of cervix with atypical squamous cells of undetermined significance (ASC-US)     History of bilateral fallopian tube excision     Multiple joint pain     Throat pain       Past Surgical History:   Procedure Laterality Date     ABDOMINOPLASTY       CONIZATION LEEP N/A 8/1/2016    Procedure: CONIZATION LEEP;  Surgeon: Chioma Wiggins MD;  Location: [facility] OR     LAPAROSCOPIC SALPINGECTOMY Bilateral 11/22/2017    Procedure: LAPAROSCOPIC SALPINGECTOMY;  LAPAROSCOPIC BILATERAL SALPINGECTOMY;  Surgeon: Kalin Hernandez MD;  Location: [facility] OR     MAMMOPLASTY AUGMENTATION BILATERAL         Current Outpatient Prescriptions   Medication     acyclovir (ZOVIRAX) 5 % cream      escitalopram (LEXAPRO) 10 MG tablet     ibuprofen (ADVIL/MOTRIN) 800 MG tablet     levonorgestrel (MIRENA, 52 MG,) 20 MCG/24HR IUD     levothyroxine (SYNTHROID/LEVOTHROID) 75 MCG tablet     VITAMIN D, CHOLECALCIFEROL, PO     No current facility-administered medications for this visit.        Allergies   Allergen Reactions     Erythromycin Nausea and Vomiting     Erythromycin base     Penicillins Rash       Family History   Problem Relation Age of Onset     Hypertension Father      Other - See Comments Father      lymphoma     Uterine Cancer Cousin        Social History     Social History     Marital status:      Spouse name: N/A     Number of children: N/A     Years of education: N/A     Occupational History     Rn [employer]     Social History Main Topics     Smoking status: Former Smoker     Quit date: 1/1/2002     Smokeless tobacco: Never Used     Alcohol use Yes      Comment: socially     Drug use: No     Sexual activity: Yes     Partners: Male     Other Topics Concern     Caffeine Concern Yes     coffee 3 cups     Parent/Sibling W/ Cabg, Mi Or Angioplasty Before 65f 55m? No     Social History Narrative     10 point ROS of systems including Constitutional, Eyes, Respiratory, Cardiovascular, Gastroenterology, Genitourinary, Integumentary, Muscularskeletal, Psychiatric were all negative except for pertinent positives noted in my HPI.    EXAM (RIGHT FOOT FOCUSED)  Constitutional: healthy, alert and no distress     Psychiatric: mentation appears normal and affect normal/bright     VASCULAR:  -Dorsalis pedis pulse +2/4   -Posterior tibial pulse +2/4   -Hair growth present   NEURO:  -Light touch sensation intact to b/l plantar forefoot  DERM:  -Skin temperature, texture and turgor WNL   MSK:  -Decreased dorsiflexion of RIGHT 1st MTPJ -- Limited to 10 degrees  -Pain with end ROM dorsiflexion with RIGHT 1st MTPJ and crepitus with dorsiflexion/plantarflexion ROM  -Dorsal exostosis on dorsum of 1st  "MTPJ, RIGHT (no pain on palpation)  -Mild medial prominence of metatarsal head with minimal lateral deviation of hallux  ============================================================    ASSESSMENT:  (M20.21) Hallux rigidus of right foot  (primary encounter diagnosis)      PLAN:  -Patient evaluated and examined. Treatment options discussed with no educational barriers noted.  -Reviewed radiographs with patient and discussed surgical options with risks and benefits. At this time, she still has mild joint space left on radiographs and her pain is only noted at end ROM. A fusion is a long-term option, but has a longer post-operative recovery and limits any motion of the joint keeping her from wearing high heels in the future. A joint \"clean-up\" with a Cartiva implant would allow increased range-of-motion and would likely decrease her pain and make her more functional. Discussed that she could potentially still have pain after the Cartiva implant and she could potentially still need a fusion in the near or far future, but the implant may also successfully decrease her pain with a much shorter post-op recovery. Also discussed that no elective foot surgery is \"needed\" and that the decision to proceed with surgery is based off her pain level and how much the pain is affecting her daily activities. Patient says she is affected by the pain and cracking of the joint daily and she would like to schedule surgery for the Cartiva implant at this time.  -Discussed post-op pain management. Will prescribe Hydrocodone 5/325mg and Zofran the day of surgery. Patient will fill the script after the procedure.   -Patient may walk after the procedure with a CAM boot on until the sutures are removed 2-3 weeks post-op. She may return to work after 3 weeks, preferably part-time the first week she is back to work.  -Will plan on Clindamycin pre-operatively (PCN allergy)    RTC Tuesday, 10/09/18 for a pre-op for RIGHT foot Cartiva implant in the " 1st MTPJ  Surgery planned for Monday, 10/22/2018. Will schedule with the OR at patient's pre-op appointment        Katty Schroeder DPM

## 2018-09-11 NOTE — PROGRESS NOTES
"  SUBJECTIVE:   Sheridan Roldan is a 37 year old female who presents to clinic today for the following health issues:      Musculoskeletal problem/pain      Duration: 5 years    Description  Location: joint pain and back pain    Intensity:  moderate    Accompanying signs and symptoms: joint stiffness    History  Previous similar problem: no   Previous evaluation:  none    Precipitating or alleviating factors:  Trauma or overuse: no   Aggravating factors include: sitting and cold or damp weather    Therapies tried and outcome: heat, Ibuprofen and chiropractor    Neck and low back all the time it hurts    Seeing chiropractor     Heat helps    Has/fingers elbow/knee/neck pain- migratory and symmetrical     No rashes    FH RA with sister and dad     Worse sx in low back - lots of stiffness and pain. NOt on HEP    Stop seeing daily chiropractor DR SHEA 2 yrs ago. Did not help     Has new mattress - helps some           Problem list and histories reviewed & adjusted, as indicated.  Additional history: as documented    Labs reviewed in EPIC    Reviewed and updated as needed this visit by clinical staff       Reviewed and updated as needed this visit by Provider         ROS:  CONSTITUTIONAL: NEGATIVE for fever, chills, change in weight  ENT/MOUTH: NEGATIVE for ear, mouth and throat problems  RESP: NEGATIVE for significant cough or SOB  CV: NEGATIVE for chest pain, palpitations or peripheral edema  ROS otherwise negative    OBJECTIVE:                                                    /70  Pulse 62  Temp 98  F (36.7  C)  Ht 5' 6.75\" (1.695 m)  Wt 180 lb (81.6 kg)  SpO2 98%  BMI 28.4 kg/m2  Body mass index is 28.4 kg/(m^2).   GENERAL: healthy, alert, well nourished, well hydrated, no distress  HENT: ear canals- normal; TMs- normal; Nose- normal; Mouth- no ulcers, no lesions  NECK: no tenderness, no adenopathy, no asymmetry, no masses, no stiffness; thyroid- normal to palpation  RESP: lungs clear to " auscultation - no rales, no rhonchi, no wheezes  CV: regular rates and rhythm, normal S1 S2, no S3 or S4 and no murmur, no click or rub -  ABDOMEN: soft, no tenderness, no  hepatosplenomegaly, no masses, normal bowel sounds  MS: extremities- no gross deformities noted, no edema all joints look well - no red/warm/swelling or decrease ROM   SKIN: no suspicious lesions, no rashes  BACK: no CVA tenderness,  paralumbar tenderness and tender over facet joints. NO radicular or SI pain worse over L4-S1          ASSESSMENT/PLAN:                                                        ICD-10-CM    1. Pain in joint, multiple sites M25.50 SSA Ro MARIBEL Antibody IgG     SSB La MARIBEL Antibody IgG     Rheumatoid factor     CRP inflammation     Erythrocyte sedimentation rate auto     Cyclic Citrullinated Peptide Antibody IgG     HLA-B27 Typing     CK total     XR Lumbar Spine 2/3 Views   2. Chronic bilateral low back pain without sciatica M54.5 XR Lumbar Spine 2/3 Views    G89.29 MR Lumbar Spine w/o Contrast     PHYSICAL THERAPY REFERRAL     Will check few more Rheum tests.  Order XR and MRI of back is her worse pain.  Discussed in length conservative measures of OTC medications for pain, Ice/Heat, elevation and the concept of R.I.C.E.. Continue behavioral changes and proper body mechanics to allow for healing. Follow up as directed.   Add PT and consider transition to chronic back stabilization program. Needs good HEP.  See what MRI an XR says bout facet joints.  Consider rhizotomy if above not help. Discussed behavioral changes and proper body mechanics needed to help control patient's back pain.   Pt agrees with plan.     Mannie Galarza MD  [facility]

## 2018-09-14 ENCOUNTER — APPOINTMENT (OUTPATIENT)
Dept: LAB | Facility: CLINIC | Age: 38
End: 2018-09-14
Attending: FAMILY MEDICINE
Payer: COMMERCIAL

## 2018-09-14 ENCOUNTER — RESULTS ONLY (OUTPATIENT)
Dept: OTHER | Facility: CLINIC | Age: 38
End: 2018-09-14

## 2018-09-14 ENCOUNTER — RADIANT APPOINTMENT (OUTPATIENT)
Dept: GENERAL RADIOLOGY | Facility: OTHER | Age: 38
End: 2018-09-14
Attending: FAMILY MEDICINE
Payer: COMMERCIAL

## 2018-09-14 ENCOUNTER — OFFICE VISIT (OUTPATIENT)
Dept: FAMILY MEDICINE | Facility: OTHER | Age: 38
End: 2018-09-14
Attending: FAMILY MEDICINE
Payer: COMMERCIAL

## 2018-09-14 VITALS
SYSTOLIC BLOOD PRESSURE: 110 MMHG | WEIGHT: 180 LBS | BODY MASS INDEX: 28.25 KG/M2 | DIASTOLIC BLOOD PRESSURE: 70 MMHG | HEART RATE: 62 BPM | HEIGHT: 67 IN | OXYGEN SATURATION: 98 % | TEMPERATURE: 98 F

## 2018-09-14 DIAGNOSIS — G89.29 CHRONIC BILATERAL LOW BACK PAIN WITHOUT SCIATICA: ICD-10-CM

## 2018-09-14 DIAGNOSIS — M25.50 PAIN IN JOINT, MULTIPLE SITES: Primary | ICD-10-CM

## 2018-09-14 DIAGNOSIS — M25.50 PAIN IN JOINT, MULTIPLE SITES: ICD-10-CM

## 2018-09-14 DIAGNOSIS — M54.50 CHRONIC BILATERAL LOW BACK PAIN WITHOUT SCIATICA: ICD-10-CM

## 2018-09-14 LAB
CK SERPL-CCNC: 93 U/L (ref 30–225)
CRP SERPL-MCNC: <2.9 MG/L (ref 0–8)
ERYTHROCYTE [SEDIMENTATION RATE] IN BLOOD BY WESTERGREN METHOD: 5 MM/H (ref 0–20)

## 2018-09-14 PROCEDURE — 82550 ASSAY OF CK (CPK): CPT | Performed by: FAMILY MEDICINE

## 2018-09-14 PROCEDURE — 36415 COLL VENOUS BLD VENIPUNCTURE: CPT | Performed by: FAMILY MEDICINE

## 2018-09-14 PROCEDURE — 86235 NUCLEAR ANTIGEN ANTIBODY: CPT | Mod: 90 | Performed by: FAMILY MEDICINE

## 2018-09-14 PROCEDURE — 86431 RHEUMATOID FACTOR QUANT: CPT | Mod: 90 | Performed by: FAMILY MEDICINE

## 2018-09-14 PROCEDURE — 86200 CCP ANTIBODY: CPT | Mod: 90 | Performed by: FAMILY MEDICINE

## 2018-09-14 PROCEDURE — 85652 RBC SED RATE AUTOMATED: CPT | Performed by: FAMILY MEDICINE

## 2018-09-14 PROCEDURE — 86235 NUCLEAR ANTIGEN ANTIBODY: CPT | Mod: 91 | Performed by: FAMILY MEDICINE

## 2018-09-14 PROCEDURE — 72100 X-RAY EXAM L-S SPINE 2/3 VWS: CPT | Mod: TC

## 2018-09-14 PROCEDURE — 86140 C-REACTIVE PROTEIN: CPT | Performed by: FAMILY MEDICINE

## 2018-09-14 PROCEDURE — 99214 OFFICE O/P EST MOD 30 MIN: CPT | Performed by: FAMILY MEDICINE

## 2018-09-14 PROCEDURE — 81374 HLA I TYPING 1 ANTIGEN LR: CPT | Performed by: FAMILY MEDICINE

## 2018-09-14 PROCEDURE — 99000 SPECIMEN HANDLING OFFICE-LAB: CPT | Performed by: FAMILY MEDICINE

## 2018-09-14 ASSESSMENT — PAIN SCALES - GENERAL: PAINLEVEL: NO PAIN (0)

## 2018-09-14 ASSESSMENT — ANXIETY QUESTIONNAIRES
4. TROUBLE RELAXING: NOT AT ALL
1. FEELING NERVOUS, ANXIOUS, OR ON EDGE: NOT AT ALL
2. NOT BEING ABLE TO STOP OR CONTROL WORRYING: NOT AT ALL
6. BECOMING EASILY ANNOYED OR IRRITABLE: NOT AT ALL
GAD7 TOTAL SCORE: 0
3. WORRYING TOO MUCH ABOUT DIFFERENT THINGS: NOT AT ALL
5. BEING SO RESTLESS THAT IT IS HARD TO SIT STILL: NOT AT ALL
7. FEELING AFRAID AS IF SOMETHING AWFUL MIGHT HAPPEN: NOT AT ALL

## 2018-09-14 NOTE — LETTER
My Depression Action Plan  Name: Sheridan Roldan   Date of Birth 1980  Date: 9/11/2018    My doctor: Danielle Waller   My clinic: [facility] [place]  [place] [address]  [phone]          GREEN    ZONE   Good Control    What it looks like:     Things are going generally well. You have normal up s and down s. You may even feel depressed from time to time, but bad moods usually last less than a day.   What you need to do:  1. Continue to care for yourself (see self care plan)  2. Check your depression survival kit and update it as needed  3. Follow your physician s recommendations including any medication.  4. Do not stop taking medication unless you consult with your physician first.           YELLOW         ZONE Getting Worse    What it looks like:     Depression is starting to interfere with your life.     It may be hard to get out of bed; you may be starting to isolate yourself from others.    Symptoms of depression are starting to last most all day and this has happened for several days.     You may have suicidal thoughts but they are not constant.   What you need to do:     1. Call your care team, your response to treatment will improve if you keep your care team informed of your progress. Yellow periods are signs an adjustment may need to be made.     2. Continue your self-care, even if you have to fake it!    3. Talk to someone in your support network    4. Open up your depression survival kit           RED    ZONE Medical Alert - Get Help    What it looks like:     Depression is seriously interfering with your life.     You may experience these or other symptoms: You can t get out of bed most days, can t work or engage in other necessary activities, you have trouble taking care of basic hygiene, or basic responsibilities, thoughts of suicide or death that will not go away, self-injurious behavior.     What you need to do:  1. Call your care team and request  a same-day appointment. If they are not available (weekends or after hours) call your local crisis line, emergency room or 911.            Depression Self Care Plan / Survival Kit    Self-Care for Depression  Here s the deal. Your body and mind are really not as separate as most people think.  What you do and think affects how you feel and how you feel influences what you do and think. This means if you do things that people who feel good do, it will help you feel better.  Sometimes this is all it takes.  There is also a place for medication and therapy depending on how severe your depression is, so be sure to consult with your medical provider and/ or Behavioral Health Consultant if your symptoms are worsening or not improving.     In order to better manage my stress, I will:    Exercise  Get some form of exercise, every day. This will help reduce pain and release endorphins, the  feel good  chemicals in your brain. This is almost as good as taking antidepressants!  This is not the same as joining a gym and then never going! (they count on that by the way ) It can be as simple as just going for a walk or doing some gardening, anything that will get you moving.      Hygiene   Maintain good hygiene (Get out of bed in the morning, Make your bed, Brush your teeth, Take a shower, and Get dressed like you were going to work, even if you are unemployed).  If your clothes don't fit try to get ones that do.    Diet  I will strive to eat foods that are good for me, drink plenty of water, and avoid excessive sugar, caffeine, alcohol, and other mood-altering substances.  Some foods that are helpful in depression are: complex carbohydrates, B vitamins, flaxseed, fish or fish oil, fresh fruits and vegetables.    Psychotherapy  I agree to participate in Individual Therapy (if recommended).    Medication  If prescribed medications, I agree to take them.  Missing doses can result in serious side effects.  I understand that drinking  alcohol, or other illicit drug use, may cause potential side effects.  I will not stop my medication abruptly without first discussing it with my provider.    Staying Connected With Others  I will stay in touch with my friends, family members, and my primary care provider/team.    Use your imagination  Be creative.  We all have a creative side; it doesn t matter if it s oil painting, sand castles, or mud pies! This will also kick up the endorphins.    Witness Beauty  (AKA stop and smell the roses) Take a look outside, even in mid-winter. Notice colors, textures. Watch the squirrels and birds.     Service to others  Be of service to others.  There is always someone else in need.  By helping others we can  get out of ourselves  and remember the really important things.  This also provides opportunities for practicing all the other parts of the program.    Humor  Laugh and be silly!  Adjust your TV habits for less news and crime-drama and more comedy.    Control your stress  Try breathing deep, massage therapy, biofeedback, and meditation. Find time to relax each day.     My support system    Clinic Contact:  Phone number:    Contact 1:  Phone number:    Contact 2:  Phone number:    Orthodox/:  Phone number:    Therapist:  Phone number:    Local crisis center:    Phone number:    Other community support:  Phone number:

## 2018-09-14 NOTE — NURSING NOTE
"Chief Complaint   Patient presents with     Pain       Initial /70  Pulse 62  Temp 98  F (36.7  C)  Ht 5' 6.75\" (1.695 m)  Wt 180 lb (81.6 kg)  SpO2 98%  BMI 28.4 kg/m2 Estimated body mass index is 28.4 kg/(m^2) as calculated from the following:    Height as of this encounter: 5' 6.75\" (1.695 m).    Weight as of this encounter: 180 lb (81.6 kg).  Medication Reconciliation: complete    Marquise Ag LPN  "

## 2018-09-14 NOTE — MR AVS SNAPSHOT
After Visit Summary   9/14/2018    Sheridan Roldan    MRN: [id number]           Patient Information     Date Of Birth          1980        Visit Information        Provider Department      9/14/2018 10:45 AM Mannie Galarza MD [facility]        Today's Diagnoses     Pain in joint, multiple sites    -  1    Chronic bilateral low back pain without sciatica           Follow-ups after your visit        Additional Services     PHYSICAL THERAPY REFERRAL       *This order will print in the [facility] Central Scheduling Office*    [facility] provides Physical Therapy evaluation and treatment and many specialty services across the [facility] system.  If requesting a specialty program, please choose from the list below.    Call [phone] to schedule [facility] at all locations, with the exception of [facility], please call [phone].     Treatment: Evaluation & Treatment  Special Instructions/Modalities: evaluate and treat - consider transition to chronic back stabilization program   REQUEST Cait [facility]   Special Programs: good HEP  And see above for chronic back program later     Please be aware that coverage of these services is subject to the terms and limitations of your health insurance plan.  Call member services at your health plan with any benefit or coverage questions.      **Note to Provider** To refer patients to therapy outside of the location list, change the order class to External Referral in the order composer.                  Your next 10 appointments already scheduled     Oct 09, 2018  8:15 AM CDT   (Arrive by 8:00 AM)   Pre-Op physical with Katty Schroeder DPM   [facility] ([facility] )    [address] [place] [id number] [phone]            José Luis 10, 2019 10:00 AM CDT   (Arrive by 9:45 AM)   PHYSICAL with REYNA Ayala CNM  "  [facility] ([facility] )    [address] [place] [address]   [phone]              Future tests that were ordered for you today     Open Future Orders        Priority Expected Expires Ordered    MR Lumbar Spine w/o Contrast Routine  9/14/2019 9/14/2018            Who to contact     If you have questions or need follow up information about today's clinic visit or your schedule please contact [facility] ([facility]) directly at [phone].  Normal or non-critical lab and imaging results will be communicated to you by Bahouihart, letter or phone within 4 business days after the clinic has received the results. If you do not hear from us within 7 days, please contact the clinic through wizboot or phone. If you have a critical or abnormal lab result, we will notify you by phone as soon as possible.  Submit refill requests through PolyTherics or call your pharmacy and they will forward the refill request to us. Please allow 3 business days for your refill to be completed.          Additional Information About Your Visit        PolyTherics Information     [employer] gives you secure access to your electronic health record. If you see a primary care provider, you can also send messages to your care team and make appointments. If you have questions, please call your primary care clinic.  If you do not have a primary care provider, please call [phone] and they will assist you.        Care EveryWhere ID     This is your Care EveryWhere ID. This could be used by other organizations to access your [place] medical records  [id number]        Your Vitals Were     Pulse Temperature Height Pulse Oximetry BMI (Body Mass Index)       62 98  F (36.7  C) 5' 6.75\" (1.695 m) 98% 28.4 kg/m2        Blood Pressure from Last 3 Encounters:   09/14/18 110/70   08/23/18 110/72   08/22/18 106/68    Weight from Last 3 Encounters:   09/14/18 180 lb (81.6 kg)   08/22/18 182 lb (82.6 kg)   06/27/18 182 lb " (82.6 kg)              We Performed the Following     CK total     CRP inflammation     Cyclic Citrullinated Peptide Antibody IgG     Erythrocyte sedimentation rate auto     HLA-B27 Typing     PHYSICAL THERAPY REFERRAL     Rheumatoid factor     SSA Ro MARIBEL Antibody IgG     SSB La MARIBEL Antibody IgG        Primary Care Provider Office Phone # Fax #    Danielle SARAH Bob [phone] [address] [address]        Equal Access to Services     [facility] MARCELO : Hadii aad ku hadasho Soomaali, waaxda luqadaha, qaybta kaalmada adeegyada, waxay idiin hayaan adeeg kharash la'aan ah. So [facility] [phone].    ATENCIÓN: Si habla español, tiene a schulz disposición servicios gratuitos de asistencia lingüística. [facility] [phone].    We comply with applicable federal civil rights laws and [place] laws. We do not discriminate on the basis of race, color, national origin, age, disability, sex, sexual orientation, or gender identity.            Thank you!     Thank you for choosing [facility]  for your care. Our goal is always to provide you with excellent care. Hearing back from our patients is one way we can continue to improve our services. Please take a few minutes to complete the written survey that you may receive in the mail after your visit with us. Thank you!             Your Updated Medication List - Protect others around you: Learn how to safely use, store and throw away your medicines at www.disposemymeds.org.          This list is accurate as of 9/14/18 12:11 PM.  Always use your most recent med list.                   Brand Name Dispense Instructions for use Diagnosis    acyclovir 5 % cream    ZOVIRAX    5 g    Apply topically 5 times daily    HSV (herpes simplex virus) infection       escitalopram 10 MG tablet    LEXAPRO    90 tablet    Take 1 tablet (10 mg) by mouth daily    Major depressive disorder, single episode, moderate (H)       ibuprofen 800 MG tablet     ADVIL/MOTRIN    40 tablet    Take 1 tablet (800 mg) by mouth every 8 hours as needed for moderate pain    Post-operative state       levonorgestrel 20 MCG/24HR IUD    MIRENA (52 MG)    1 each    1 each (20 mcg) by Intrauterine route continuous    Family planning       levothyroxine 75 MCG tablet    SYNTHROID/LEVOTHROID    90 tablet    Take 1 tablet (75 mcg) by mouth daily    Goiter       VITAMIN D (CHOLECALCIFEROL) PO      Take 6,000 Units by mouth daily

## 2018-09-15 ASSESSMENT — ANXIETY QUESTIONNAIRES: GAD7 TOTAL SCORE: 0

## 2018-09-15 ASSESSMENT — PATIENT HEALTH QUESTIONNAIRE - PHQ9: SUM OF ALL RESPONSES TO PHQ QUESTIONS 1-9: 3

## 2018-09-18 LAB
CCP AB SER IA-ACNC: 1 U/ML
ENA SS-A IGG SER IA-ACNC: <0.2 AI (ref 0–0.9)
ENA SS-B IGG SER IA-ACNC: <0.2 AI (ref 0–0.9)
HLA-B27 QL NAA+PROBE: NORMAL
RHEUMATOID FACT SER NEPH-ACNC: <20 IU/ML (ref 0–20)

## 2018-09-19 LAB
B LOCUS: NORMAL
B27TEST METHOD: NORMAL

## 2018-09-25 ENCOUNTER — HOSPITAL ENCOUNTER (OUTPATIENT)
Dept: MRI IMAGING | Facility: HOSPITAL | Age: 38
Discharge: HOME | End: 2018-09-25
Attending: FAMILY MEDICINE | Admitting: FAMILY MEDICINE
Payer: COMMERCIAL

## 2018-09-25 DIAGNOSIS — G89.29 CHRONIC BILATERAL LOW BACK PAIN WITHOUT SCIATICA: ICD-10-CM

## 2018-09-25 DIAGNOSIS — M54.50 CHRONIC BILATERAL LOW BACK PAIN WITHOUT SCIATICA: ICD-10-CM

## 2018-09-25 PROCEDURE — 72148 MRI LUMBAR SPINE W/O DYE: CPT | Mod: TC

## 2018-10-09 ENCOUNTER — OFFICE VISIT (OUTPATIENT)
Dept: PODIATRY | Facility: OTHER | Age: 38
End: 2018-10-09
Attending: PODIATRIST
Payer: COMMERCIAL

## 2018-10-09 VITALS
DIASTOLIC BLOOD PRESSURE: 75 MMHG | BODY MASS INDEX: 29.11 KG/M2 | WEIGHT: 184.5 LBS | HEART RATE: 75 BPM | TEMPERATURE: 97.6 F | SYSTOLIC BLOOD PRESSURE: 125 MMHG

## 2018-10-09 DIAGNOSIS — M79.671 FOOT PAIN, RIGHT: ICD-10-CM

## 2018-10-09 DIAGNOSIS — Z00.00 ROUTINE HISTORY AND PHYSICAL EXAMINATION OF ADULT: ICD-10-CM

## 2018-10-09 DIAGNOSIS — M20.21 HALLUX RIGIDUS OF RIGHT FOOT: Primary | ICD-10-CM

## 2018-10-09 PROCEDURE — 99213 OFFICE O/P EST LOW 20 MIN: CPT | Performed by: PODIATRIST

## 2018-10-09 ASSESSMENT — PAIN SCALES - GENERAL: PAINLEVEL: NO PAIN (0)

## 2018-10-09 NOTE — MR AVS SNAPSHOT
After Visit Summary   10/9/2018    Sheridan Roldan    MRN: [id number]           Patient Information     Date Of Birth          1980        Visit Information        Provider Department      10/9/2018 8:15 AM Katty Schroeder DPM [facility]        Today's Diagnoses     Hallux rigidus of right foot    -  1    Foot pain, right        Routine history and physical examination of adult           Follow-ups after your visit        Your next 10 appointments already scheduled     Oct 29, 2018   Procedure with Katty Schroeder DPM   [facility] ([facility] )    [facility] [address] [place] [address]   [phone]            Nov 02, 2018  8:15 AM CDT   (Arrive by 8:00 AM)   Post Op with Katty Schroeder DPM   [facility] ([facility] )    [address] [facility] [id number] [phone]            José Luis 10, 2019 10:00 AM CDT   (Arrive by 9:45 AM)   PHYSICAL with REYNA Ayala CNM   [facility] ([facility] )    [address] [place] [address]   [phone]              Future tests that were ordered for you today     Open Future Orders        Priority Expected Expires Ordered    HCG Quantitative Pregnancy, Blood (TEB332) Routine  10/9/2019 10/9/2018            Who to contact     If you have questions or need follow up information about today's clinic visit or your schedule please contact [facility] directly at [phone].  Normal or non-critical lab and imaging results will be communicated to you by MyChart, letter or phone within 4 business days after the clinic has received the results. If you do not hear from us within 7 days, please contact the clinic through MyChart or phone. If you have a critical or abnormal lab result, we will notify you by phone as soon as possible.  Submit refill requests through xaitmenthart or  call your pharmacy and they will forward the refill request to us. Please allow 3 business days for your refill to be completed.          Additional Information About Your Visit        MyChart Information     Con[employer] Networks[employer] gives you secure access to your electronic health record. If you see a primary care provider, you can also send messages to your care team and make appointments. If you have questions, please call your primary care clinic.  If you do not have a primary care provider, please call [phone] and they will assist you.        Care EveryWhere ID     This is your Care EveryWhere ID. This could be used by other organizations to access your [place] medical records  [id number]        Your Vitals Were     Pulse Temperature BMI (Body Mass Index)             75 97.6  F (36.4  C) (Temporal) 29.11 kg/m2          Blood Pressure from Last 3 Encounters:   10/09/18 125/75   09/14/18 110/70   08/23/18 110/72    Weight from Last 3 Encounters:   10/09/18 184 lb 8 oz (83.7 kg)   09/14/18 180 lb (81.6 kg)   08/22/18 182 lb (82.6 kg)              We Performed the Following     Crista-Operative Worksheet        Primary Care Provider Office Phone # Fax #    SARAH Bond [phone]       [address] [place] [address]        Equal Access to Services     AMBROSIO ROBERTSON : Hadii charan ku hadasho Soomaali, waaxda luqadaha, qaybta kaalmada adeegyada, lizz herring . So [facility] [phone].    ATENCIÓN: Si habla español, tiene a schulz disposición servicios gratuitos de asistencia lingüística. Llame al [phone].    We comply with applicable federal civil rights laws and [place] laws. We do not discriminate on the basis of race, color, national origin, age, disability, sex, sexual orientation, or gender identity.            Thank you!     Thank you for choosing [facility]  for your care. Our goal is always to provide you with excellent care. Hearing  back from our patients is one way we can continue to improve our services. Please take a few minutes to complete the written survey that you may receive in the mail after your visit with us. Thank you!             Your Updated Medication List - Protect others around you: Learn how to safely use, store and throw away your medicines at www.disposemymeds.org.          This list is accurate as of 10/9/18  5:34 PM.  Always use your most recent med list.                   Brand Name Dispense Instructions for use Diagnosis    acyclovir 5 % cream    ZOVIRAX    5 g    Apply topically 5 times daily    HSV (herpes simplex virus) infection       escitalopram 10 MG tablet    LEXAPRO    90 tablet    Take 1 tablet (10 mg) by mouth daily    Major depressive disorder, single episode, moderate (H)       ibuprofen 800 MG tablet    ADVIL/MOTRIN    40 tablet    Take 1 tablet (800 mg) by mouth every 8 hours as needed for moderate pain    Post-operative state       levonorgestrel 20 MCG/24HR IUD    MIRENA (52 MG)    1 each    1 each (20 mcg) by Intrauterine route continuous    Family planning       levothyroxine 75 MCG tablet    SYNTHROID/LEVOTHROID    90 tablet    Take 1 tablet (75 mcg) by mouth daily    Goiter       VITAMIN D (CHOLECALCIFEROL) PO      Take 6,000 Units by mouth daily

## 2018-10-09 NOTE — NURSING NOTE
"No chief complaint on file.      Initial /75 (BP Location: Right arm, Patient Position: Sitting, Cuff Size: Adult Regular)  Pulse 75  Temp 97.6  F (36.4  C) (Temporal)  Wt 184 lb 8 oz (83.7 kg)  BMI 29.11 kg/m2 Estimated body mass index is 29.11 kg/(m^2) as calculated from the following:    Height as of 9/14/18: 5' 6.75\" (1.695 m).    Weight as of this encounter: 184 lb 8 oz (83.7 kg).  Medication Reconciliation: complete    Erika Thibodeaux LPN    "

## 2018-10-09 NOTE — PROGRESS NOTES
History and Physical  Podiatry     Date of Admission:  (Not on file)    Assessment & Plan   HPI: This 37 year old female is seen for a pre-operative visit for a RIGHT foot hallux rigidus and surgical planning.  -H&P performed today--see below  -Surgery planned for Monday, 10/29/2018 for a RIGHT metatarsophalangeal joint cheilectomy with implant (will be using a 10mm Cartiva implant)  -CAM boot to be dispensed the day of surgery  -Percocet 5/325mg 40 tablets to be dispensed the day of surgery  -Zofran ODT to be dispensed the day of surgery per patient request  -Patient has a PCN allergy--anesthesia will administer Clindamycin IV prior to surgery instead of Ancef  -Discussed risks and benefits of procedure with patient including potential recurrence of the deformity and a future need for a 1st MTPJ fusion and a potential post-operative infection. Informed patient that the Cartiva implant may not significantly improve her ROM, but the goal is to decrease her pain.  Patient understands the risks and benefits and she would like to proceed with the procedure.  -hCG ordered--patient to obtain one week prior to surgery.  -Patient's work release papers dispensed to patient. She will fax them herself. She also needs an [employer] form signed, but it cannot be signed until the day of the surgery. She will bring it with her the morning of surgery.  -Post-op appointment set for Friday, 11/02/2018 at 8:15 a.m.      Active Problems:    * No active hospital problems. *      Katty Schroeder    Code Status   Full Code    Primary Care Physician   Danielle Waller    Chief Complaint   Painful RIGHT 1st MTPJ    History is obtained from the patient    History of Present Illness   Sheridan Roldan is a 37 year old female who presents for a pre-op and H&P regarding surgery for a painful RIGHT 1st MTPJ. She relates that her joint cracks when she walks and the pain is irritating when she is working. 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 She doesn't have constant pain but often gets a sharp pain when she suddenly dorsiflexes her hallux. This has been an ongoing problem that limits her daily and has not improved. She is unable to walk a day in sandals or to wear high heels at any time because of the pain. No further pedal complaints today.    Past Medical History    I have reviewed this patient's medical history and updated it with pertinent information if needed.   Past Medical History:   Diagnosis Date     Anxiety state, unspecified 3/12/2007     Breech presentation without mention of version, antepartum 1/10/2006     Galactorrhea not associated with childbirth 05/10/2005     Leukorrhea, not specified as infective 2005     Post term pregnancy, antepartum condition or complication 2006     Spotting complicating pregnancy, antepartum condition or complication 2008     Supervision of other normal pregnancy 2005     Threatened , antepartum 2007       Past Surgical History   I have reviewed this patient's surgical history and updated it with pertinent information if needed.  Past Surgical History:   Procedure Laterality Date     ABDOMINOPLASTY       CONIZATION LEEP N/A 2016    Procedure: CONIZATION LEEP;  Surgeon: Chioma Wiggins MD;  Location: [facility]     LAPAROSCOPIC SALPINGECTOMY Bilateral 2017    Procedure: LAPAROSCOPIC SALPINGECTOMY;  LAPAROSCOPIC BILATERAL SALPINGECTOMY;  Surgeon: Kalin Hernandez MD;  Location: [facility]     MAMMOPLASTY AUGMENTATION BILATERAL         Prior to Admission Medications   Cannot display prior to admission medications because the patient has not been admitted in this contact.     Allergies   Allergies   Allergen Reactions     Erythromycin Nausea and Vomiting     Erythromycin base     Penicillins Rash       Social History   I have reviewed this patient's social history and updated it with pertinent information if needed. Sheridan Roldan  reports that she  quit smoking about 16 years ago. She has never used smokeless tobacco. She reports that she drinks alcohol. She reports that she does not use illicit drugs.    Family History   I have reviewed this patient's family history and updated it with pertinent information if needed.   Family History   Problem Relation Age of Onset     Hypertension Father      Other - See Comments Father      lymphoma     Uterine Cancer Cousin          Review of Systems   CONSTITUTIONAL:  negative for  fevers, chills, sweats, fatigue, malaise, anorexia and weight loss  EYES:  positive for  double vision, blurred vision and visual disturbance  HEENT:  negative for  hearing loss, tinnitus, earaches, nasal congestion and sore throat  RESPIRATORY:  negative for  dry cough, cough with sputum, dyspnea, wheezing and chest pain  CARDIOVASCULAR:  negative for  chest pain, dyspnea, palpitations, exertional chest pressure/discomfort, edema  GASTROINTESTINAL:  negative for nausea, vomiting, abdominal pain, dysphagia and regurgitation  GENITOURINARY:  negative for nocturia  INTEGUMENT/BREAST:  negative for dryness, skin color change, changes in hair and changes in nails  HEMATOLOGIC/LYMPHATIC:  negative for easy bruising, bleeding, lymphadenopathy, and swelling/edema  ALLERGIC/IMMUNOLOGIC:  negative for hay fever, anaphylaxis and drug reactions  ENDOCRINE:  negative for heat intolerance, cold intolerance, tremor and weight changes  MUSCULOSKELETAL:  +Painful ROM of RIGHT 1st MTPJ. negative for  myalgias, muscle weakness and bone pain  NEUROLOGICAL:  negative for headaches, dizziness, seizures and memory problems  BEHAVIOR/PSYCH:  negative for poor appetite, increased appetite, decreased sleep, increased sleep and depressed mood        Physical Exam                      /75 (BP Location: Right arm, Patient Position: Sitting, Cuff Size: Adult Regular)  Pulse 75  Temp 97.6  F (36.4  C) (Temporal)  Wt 184 lb 8 oz (83.7 kg)  BMI 29.11 kg/m2          Constitutional: awake, alert, cooperative, no apparent distress, and appears stated age  Hematologic / Lymphatic: No increased bleeding, bruising, or LOWER EXTREMITY edema  Respiratory: No increased work of breathing, good air exchange, clear to auscultation bilaterally, no crackles or wheezing  Cardiovascular: Normal apical impulse, regular rate and rhythm, normal S1 and S2, no S3 or S4, and no murmur noted  GI: No scars, normal bowel sounds, soft, non-distended, non-tender, no masses palpated, no hepatosplenomegally  Skin: no bruising or bleeding, normal skin color, texture, turgor, no redness, warmth, or swelling, nails normal without discoloration or clubbing and no jaundice  Musculoskeletal: There is no redness, warmth, or swelling of the joints.  Full range of motion noted.  Motor strength is 5 out of 5 all extremities bilaterally.  Tone is normal.  Neurologic: Awake, alert, oriented to name, place and time.  Cranial nerves II-XII are grossly intact.  Motor is 5 out of 5 bilaterally.  Cerebellar finger to nose, heel to shin intact.  Sensory is intact.  Babinski down going, Romberg negative, and gait is normal.  Mental Status Exam:  Level of Alertness:   awake  Orientation:   person, place, time  Memory:   normal  Fund of Knowledge:  normal  Attention/Concentration:  normal  Motor Exam:  moves all extremities well and symmetrically  Sensory:  Sensory intact  Deep Tendon Reflexes:  Reflexes are intact and symmetrical bilaterally  Neuropsychiatric: General: normal, calm and normal eye contact  Level of consciousness: alert / normal  Affect: normal  Orientation: oriented to self, place, time and situation  Memory and insight: normal, memory for past and recent events intact and thought process normal      RIGHT FOOT FOCUSED EXAM  Constitutional: healthy, alert and no distress      Psychiatric: mentation appears normal and affect normal/bright      VASCULAR:  -Dorsalis pedis pulse +2/4   -Posterior tibial pulse  +2/4   -Hair growth present to dorsal foot  -CAPILLARY FILL TIME < 3 seconds to hallux  NEURO:  -Light touch sensation intact to plantar forefoot  DERM:  -Mild erythema and edema to dorsal RIGHT 1st MTPJ  -Skin temperature, texture and turgor WNL to RIGHT foot and leg   MSK:  -Decreased dorsiflexion of RIGHT 1st MTPJ -- Limited to 10 degrees  -Pain with end ROM dorsiflexion with RIGHT 1st MTPJ and crepitus with dorsiflexion/plantarflexion ROM (PLANTARFLEXION pain greater then DORSIFLEXION pain)  -Dorsal exostosis on dorsum of 1st MTPJ, RIGHT (no pain on palpation)  -Mild medial prominence of metatarsal head with no lateral deviation of hallux    Labs  hCG ordered--patient to obtain one week prior to surgery

## 2018-10-26 ENCOUNTER — ANESTHESIA EVENT (OUTPATIENT)
Dept: SURGERY | Facility: HOSPITAL | Age: 38
End: 2018-10-26
Payer: COMMERCIAL

## 2018-10-26 DIAGNOSIS — M79.671 FOOT PAIN, RIGHT: ICD-10-CM

## 2018-10-26 DIAGNOSIS — M20.21 HALLUX RIGIDUS OF RIGHT FOOT: ICD-10-CM

## 2018-10-26 LAB — B-HCG SERPL-ACNC: <1 IU/L (ref 0–5)

## 2018-10-26 PROCEDURE — 84702 CHORIONIC GONADOTROPIN TEST: CPT | Performed by: PODIATRIST

## 2018-10-26 PROCEDURE — 36415 COLL VENOUS BLD VENIPUNCTURE: CPT | Performed by: PODIATRIST

## 2018-10-26 ASSESSMENT — LIFESTYLE VARIABLES: TOBACCO_USE: 1

## 2018-10-26 NOTE — ANESTHESIA PREPROCEDURE EVALUATION
Anesthesia Evaluation     . Pt has had prior anesthetic. Type: General    No history of anesthetic complications          ROS/MED HX    ENT/Pulmonary:     (+)tobacco use, Past use , . .    Neurologic:  - neg neurologic ROS     Cardiovascular:  - neg cardiovascular ROS       METS/Exercise Tolerance:     Hematologic:  - neg hematologic  ROS       Musculoskeletal:   (+) arthritis, , , -       GI/Hepatic:  - neg GI/hepatic ROS       Renal/Genitourinary:  - ROS Renal section negative       Endo:     (+) thyroid problem hypothyroidism, .      Psychiatric:     (+) psychiatric history anxiety and depression      Infectious Disease:  - neg infectious disease ROS       Malignancy:      - no malignancy   Other:    - neg other ROS                 Physical Exam  Normal systems: dental    Airway   Mallampati: II  TM distance: >3 FB  Neck ROM: full    Dental     Cardiovascular   Rhythm and rate: regular and normal      Pulmonary    breath sounds clear to auscultation                    Anesthesia Plan      History & Physical Review  History and physical reviewed and following examination; no interval change.    ASA Status:  2 .    NPO Status:  > 8 hours    Plan for MAC with Intravenous induction. Reason for MAC:  Difficulty with conscious sedation (QS) and Deep or markedly invasive procedure (G8)         Postoperative Care      Consents  Anesthetic plan, risks, benefits and alternatives discussed with:  Patient..                          .

## 2018-10-29 ENCOUNTER — APPOINTMENT (OUTPATIENT)
Dept: GENERAL RADIOLOGY | Facility: HOSPITAL | Age: 38
End: 2018-10-29
Attending: PODIATRIST
Payer: COMMERCIAL

## 2018-10-29 ENCOUNTER — HOSPITAL ENCOUNTER (OUTPATIENT)
Facility: HOSPITAL | Age: 38
Discharge: HOME | End: 2018-10-29
Attending: PODIATRIST | Admitting: PODIATRIST
Payer: COMMERCIAL

## 2018-10-29 ENCOUNTER — APPOINTMENT (OUTPATIENT)
Dept: LAB | Facility: HOSPITAL | Age: 38
End: 2018-10-29
Attending: PODIATRIST
Payer: COMMERCIAL

## 2018-10-29 ENCOUNTER — SURGERY (OUTPATIENT)
Age: 38
End: 2018-10-29

## 2018-10-29 ENCOUNTER — ANESTHESIA (OUTPATIENT)
Dept: SURGERY | Facility: HOSPITAL | Age: 38
End: 2018-10-29
Payer: COMMERCIAL

## 2018-10-29 VITALS
HEIGHT: 67 IN | SYSTOLIC BLOOD PRESSURE: 103 MMHG | DIASTOLIC BLOOD PRESSURE: 77 MMHG | RESPIRATION RATE: 18 BRPM | OXYGEN SATURATION: 96 % | TEMPERATURE: 98.5 F | BODY MASS INDEX: 29.22 KG/M2 | WEIGHT: 186.2 LBS

## 2018-10-29 DIAGNOSIS — M20.21 HALLUX RIGIDUS, RIGHT FOOT: Primary | ICD-10-CM

## 2018-10-29 LAB — HCG UR QL: NEGATIVE

## 2018-10-29 PROCEDURE — 81025 URINE PREGNANCY TEST: CPT | Performed by: ANESTHESIOLOGY

## 2018-10-29 PROCEDURE — 37000008 ZZH ANESTHESIA TECHNICAL FEE, 1ST 30 MIN: Performed by: PODIATRIST

## 2018-10-29 PROCEDURE — 25000128 H RX IP 250 OP 636: Performed by: PODIATRIST

## 2018-10-29 PROCEDURE — 25000125 ZZHC RX 250: Performed by: NURSE ANESTHETIST, CERTIFIED REGISTERED

## 2018-10-29 PROCEDURE — 40000277 XR SURGERY CARM FLUORO LESS THAN 5 MIN W STILLS: Mod: TC

## 2018-10-29 PROCEDURE — 01999 UNLISTED ANES PROCEDURE: CPT | Performed by: NURSE ANESTHETIST, CERTIFIED REGISTERED

## 2018-10-29 PROCEDURE — 71000027 ZZH RECOVERY PHASE 2 EACH 15 MINS: Performed by: PODIATRIST

## 2018-10-29 PROCEDURE — 36000060 ZZH SURGERY LEVEL 3 W FLUORO 1ST 30 MIN: Performed by: PODIATRIST

## 2018-10-29 PROCEDURE — 25000128 H RX IP 250 OP 636: Performed by: NURSE ANESTHETIST, CERTIFIED REGISTERED

## 2018-10-29 PROCEDURE — 27110028 ZZH OR GENERAL SUPPLY NON-STERILE: Performed by: PODIATRIST

## 2018-10-29 PROCEDURE — C1713 ANCHOR/SCREW BN/BN,TIS/BN: HCPCS | Performed by: PODIATRIST

## 2018-10-29 PROCEDURE — 27210794 ZZH OR GENERAL SUPPLY STERILE: Performed by: PODIATRIST

## 2018-10-29 PROCEDURE — 25000125 ZZHC RX 250: Performed by: PODIATRIST

## 2018-10-29 PROCEDURE — 37000009 ZZH ANESTHESIA TECHNICAL FEE, EACH ADDTL 15 MIN: Performed by: PODIATRIST

## 2018-10-29 PROCEDURE — 36000058 ZZH SURGERY LEVEL 3 EA 15 ADDTL MIN: Performed by: PODIATRIST

## 2018-10-29 PROCEDURE — 40000305 ZZH STATISTIC PRE PROC ASSESS I: Performed by: PODIATRIST

## 2018-10-29 PROCEDURE — 25000128 H RX IP 250 OP 636: Performed by: ANESTHESIOLOGY

## 2018-10-29 PROCEDURE — 27211024 ZZHC OR SUPPLY OTHER OPNP: Performed by: PODIATRIST

## 2018-10-29 PROCEDURE — 28289 CORRJ HALUX RIGDUS W/O IMPLT: CPT | Performed by: ANESTHESIOLOGY

## 2018-10-29 PROCEDURE — 25000132 ZZH RX MED GY IP 250 OP 250 PS 637: Performed by: ANESTHESIOLOGY

## 2018-10-29 PROCEDURE — 28291 CORRJ HALUX RIGDUS W/IMPLT: CPT | Mod: RT | Performed by: PODIATRIST

## 2018-10-29 RX ORDER — PROPOFOL 10 MG/ML
INJECTION, EMULSION INTRAVENOUS PRN
Status: DISCONTINUED | OUTPATIENT
Start: 2018-10-29 | End: 2018-10-29

## 2018-10-29 RX ORDER — NALOXONE HYDROCHLORIDE 0.4 MG/ML
.1-.4 INJECTION, SOLUTION INTRAMUSCULAR; INTRAVENOUS; SUBCUTANEOUS
Status: DISCONTINUED | OUTPATIENT
Start: 2018-10-29 | End: 2018-10-29 | Stop reason: HOSPADM

## 2018-10-29 RX ORDER — CLINDAMYCIN PHOSPHATE 900 MG/50ML
900 INJECTION, SOLUTION INTRAVENOUS
Status: COMPLETED | OUTPATIENT
Start: 2018-10-29 | End: 2018-10-29

## 2018-10-29 RX ORDER — HYDROMORPHONE HYDROCHLORIDE 1 MG/ML
.3-.5 INJECTION, SOLUTION INTRAMUSCULAR; INTRAVENOUS; SUBCUTANEOUS EVERY 10 MIN PRN
Status: DISCONTINUED | OUTPATIENT
Start: 2018-10-29 | End: 2018-10-29 | Stop reason: HOSPADM

## 2018-10-29 RX ORDER — KETOROLAC TROMETHAMINE 30 MG/ML
30 INJECTION, SOLUTION INTRAMUSCULAR; INTRAVENOUS EVERY 6 HOURS PRN
Status: DISCONTINUED | OUTPATIENT
Start: 2018-10-29 | End: 2018-10-29 | Stop reason: HOSPADM

## 2018-10-29 RX ORDER — FENTANYL CITRATE 50 UG/ML
25-50 INJECTION, SOLUTION INTRAMUSCULAR; INTRAVENOUS EVERY 5 MIN PRN
Status: DISCONTINUED | OUTPATIENT
Start: 2018-10-29 | End: 2018-10-29 | Stop reason: HOSPADM

## 2018-10-29 RX ORDER — FENTANYL CITRATE 50 UG/ML
25-50 INJECTION, SOLUTION INTRAMUSCULAR; INTRAVENOUS
Status: DISCONTINUED | OUTPATIENT
Start: 2018-10-29 | End: 2018-10-29 | Stop reason: HOSPADM

## 2018-10-29 RX ORDER — FENTANYL CITRATE 50 UG/ML
INJECTION, SOLUTION INTRAMUSCULAR; INTRAVENOUS PRN
Status: DISCONTINUED | OUTPATIENT
Start: 2018-10-29 | End: 2018-10-29

## 2018-10-29 RX ORDER — SODIUM CHLORIDE, SODIUM LACTATE, POTASSIUM CHLORIDE, CALCIUM CHLORIDE 600; 310; 30; 20 MG/100ML; MG/100ML; MG/100ML; MG/100ML
INJECTION, SOLUTION INTRAVENOUS CONTINUOUS
Status: DISCONTINUED | OUTPATIENT
Start: 2018-10-29 | End: 2018-10-29 | Stop reason: HOSPADM

## 2018-10-29 RX ORDER — OXYCODONE AND ACETAMINOPHEN 5; 325 MG/1; MG/1
1 TABLET ORAL EVERY 6 HOURS PRN
Qty: 40 TABLET | Refills: 0 | Status: SHIPPED | OUTPATIENT
Start: 2018-10-29 | End: 2018-11-08

## 2018-10-29 RX ORDER — LABETALOL HYDROCHLORIDE 5 MG/ML
10 INJECTION, SOLUTION INTRAVENOUS
Status: DISCONTINUED | OUTPATIENT
Start: 2018-10-29 | End: 2018-10-29 | Stop reason: HOSPADM

## 2018-10-29 RX ORDER — DEXAMETHASONE SODIUM PHOSPHATE 10 MG/ML
INJECTION, SOLUTION INTRAMUSCULAR; INTRAVENOUS PRN
Status: DISCONTINUED | OUTPATIENT
Start: 2018-10-29 | End: 2018-10-29

## 2018-10-29 RX ORDER — ONDANSETRON 2 MG/ML
4 INJECTION INTRAMUSCULAR; INTRAVENOUS EVERY 30 MIN PRN
Status: DISCONTINUED | OUTPATIENT
Start: 2018-10-29 | End: 2018-10-29 | Stop reason: HOSPADM

## 2018-10-29 RX ORDER — ONDANSETRON 4 MG/1
4 TABLET, ORALLY DISINTEGRATING ORAL EVERY 30 MIN PRN
Status: DISCONTINUED | OUTPATIENT
Start: 2018-10-29 | End: 2018-10-29 | Stop reason: HOSPADM

## 2018-10-29 RX ORDER — ONDANSETRON 2 MG/ML
INJECTION INTRAMUSCULAR; INTRAVENOUS PRN
Status: DISCONTINUED | OUTPATIENT
Start: 2018-10-29 | End: 2018-10-29

## 2018-10-29 RX ORDER — PROPOFOL 10 MG/ML
INJECTION, EMULSION INTRAVENOUS CONTINUOUS PRN
Status: DISCONTINUED | OUTPATIENT
Start: 2018-10-29 | End: 2018-10-29

## 2018-10-29 RX ORDER — MEPERIDINE HYDROCHLORIDE 50 MG/ML
12.5 INJECTION INTRAMUSCULAR; INTRAVENOUS; SUBCUTANEOUS
Status: DISCONTINUED | OUTPATIENT
Start: 2018-10-29 | End: 2018-10-29 | Stop reason: HOSPADM

## 2018-10-29 RX ORDER — CLINDAMYCIN PHOSPHATE 900 MG/50ML
900 INJECTION, SOLUTION INTRAVENOUS SEE ADMIN INSTRUCTIONS
Status: DISCONTINUED | OUTPATIENT
Start: 2018-10-29 | End: 2018-10-29 | Stop reason: HOSPADM

## 2018-10-29 RX ORDER — ONDANSETRON 4 MG/1
4-8 TABLET, ORALLY DISINTEGRATING ORAL EVERY 8 HOURS PRN
Qty: 40 TABLET | Refills: 1 | Status: SHIPPED | OUTPATIENT
Start: 2018-10-29 | End: 2022-05-18

## 2018-10-29 RX ORDER — OXYCODONE AND ACETAMINOPHEN 5; 325 MG/1; MG/1
1 TABLET ORAL ONCE
Status: COMPLETED | OUTPATIENT
Start: 2018-10-29 | End: 2018-10-29

## 2018-10-29 RX ADMIN — PROPOFOL 40 MG: 10 INJECTION, EMULSION INTRAVENOUS at 08:09

## 2018-10-29 RX ADMIN — CLINDAMYCIN PHOSPHATE 900 MG: 18 INJECTION, SOLUTION INTRAVENOUS at 08:00

## 2018-10-29 RX ADMIN — LIDOCAINE HYDROCHLORIDE 20 ML: 10 INJECTION, SOLUTION EPIDURAL; INFILTRATION; INTRACAUDAL; PERINEURAL at 09:00

## 2018-10-29 RX ADMIN — FENTANYL CITRATE 50 MCG: 50 INJECTION, SOLUTION INTRAMUSCULAR; INTRAVENOUS at 09:54

## 2018-10-29 RX ADMIN — KETOROLAC TROMETHAMINE 30 MG: 30 INJECTION, SOLUTION INTRAMUSCULAR at 10:13

## 2018-10-29 RX ADMIN — SODIUM CHLORIDE, POTASSIUM CHLORIDE, SODIUM LACTATE AND CALCIUM CHLORIDE: 600; 310; 30; 20 INJECTION, SOLUTION INTRAVENOUS at 07:41

## 2018-10-29 RX ADMIN — FENTANYL CITRATE 25 MCG: 50 INJECTION, SOLUTION INTRAMUSCULAR; INTRAVENOUS at 08:05

## 2018-10-29 RX ADMIN — FENTANYL CITRATE 25 MCG: 50 INJECTION, SOLUTION INTRAMUSCULAR; INTRAVENOUS at 08:29

## 2018-10-29 RX ADMIN — PROPOFOL 150 MCG/KG/MIN: 10 INJECTION, EMULSION INTRAVENOUS at 08:09

## 2018-10-29 RX ADMIN — DEXAMETHASONE SODIUM PHOSPHATE 10 MG: 10 INJECTION, SOLUTION INTRAMUSCULAR; INTRAVENOUS at 08:10

## 2018-10-29 RX ADMIN — PROPOFOL 50 MG: 10 INJECTION, EMULSION INTRAVENOUS at 08:07

## 2018-10-29 RX ADMIN — FENTANYL CITRATE 25 MCG: 50 INJECTION, SOLUTION INTRAMUSCULAR; INTRAVENOUS at 08:07

## 2018-10-29 RX ADMIN — FENTANYL CITRATE 25 MCG: 50 INJECTION, SOLUTION INTRAMUSCULAR; INTRAVENOUS at 08:11

## 2018-10-29 RX ADMIN — ONDANSETRON 4 MG: 2 INJECTION INTRAMUSCULAR; INTRAVENOUS at 08:10

## 2018-10-29 RX ADMIN — PROPOFOL 50 MG: 10 INJECTION, EMULSION INTRAVENOUS at 08:05

## 2018-10-29 RX ADMIN — OXYCODONE AND ACETAMINOPHEN 1 TABLET: 5; 325 TABLET ORAL at 10:24

## 2018-10-29 RX ADMIN — MIDAZOLAM 2 MG: 1 INJECTION INTRAMUSCULAR; INTRAVENOUS at 07:58

## 2018-10-29 RX ADMIN — FENTANYL CITRATE 25 MCG: 50 INJECTION, SOLUTION INTRAMUSCULAR; INTRAVENOUS at 08:09

## 2018-10-29 NOTE — IP AVS SNAPSHOT
MRN:[id number]                      After Visit Summary   10/29/2018    Sheridan Roldan    MRN: [id number]           Thank you!     Thank you for choosing [facility] for your care. Our goal is always to provide you with excellent care. Hearing back from our patients is one way we can continue to improve our services. Please take a few minutes to complete the written survey that you may receive in the mail after you visit with us. Thank you!        Patient Information     Date Of Birth          1980        About your hospital stay     You were admitted on:  October 29, 2018 You last received care in the:  [facility] Preop/Phase II    You were discharged on:  October 29, 2018        Reason for your hospital stay       You were here today for surgery to place an implant into your RIGHT big toe joint                  Who to Call     For medical emergencies, please call 911.  For non-urgent questions about your medical care, please call your primary care provider or clinic, [phone]  For questions related to your surgery, please call your surgery clinic        Attending Provider     Provider Specialty    Katty Schroeder DPM Podiatry       Primary Care Provider Office Phone # Fax #    SARAH Bond [phone]      After Care Instructions     Activity       Your activity upon discharge: Do not walk without a CAM boot. Stay off your foot as much as possible for the first two weeks until your sutures are removed. Elevate your leg at home and use crutches often for the first two weeks. You may, however, walk with the CAM boot without crutches if needed.                  Follow-up Appointments     Follow-up and recommended labs and tests        Follow up with me,  Katty Schroeder, on Friday, 11/02/2018.                  Your next 10 appointments already scheduled     Nov 02, 2018  8:15 AM CDT   (Arrive by 8:00 AM)   Post Op with ARACELY Ling  [facility] [place] [facility] [place] [facility]    [address] [place] [address] [phone]            José Luis 10, 2019 10:00 AM CDT   (Arrive by 9:45 AM)   PHYSICAL with REYNA Ayala CNM   [facility] [place] ([facility] [place]    [address] [address]   [phone]              Further instructions from your care team           Post-Anesthesia Patient Instructions    IMMEDIATELY FOLLOWING SURGERY:  Do not drive or operate machinery for the first twenty four hours after surgery.  Do not make any important decisions for twenty four hours after surgery or while taking narcotic pain medications or sedatives.  If you develop intractable nausea and vomiting or a severe headache please notify your doctor immediately.    FOLLOW-UP:  Please make an appointment with your surgeon as instructed. You do not need to follow up with anesthesia unless specifically instructed to do so.    WOUND CARE INSTRUCTIONS (if applicable):  Keep a dry clean dressing on the anesthesia/puncture wound site if there is drainage.  Once the wound has quit draining you may leave it open to air.  Generally you should leave the bandage intact for twenty four hours unless there is drainage.  If the epidural site drains for more than 36-48 hours please call the anesthesia department.    QUESTIONS?:  Please feel free to call your physician or the hospital  if you have any questions, and they will be happy to assist you.       Pending Results     No orders found from 10/27/2018 to 10/30/2018.            Admission Information     Date & Time Provider Department Dept. Phone    10/29/2018 Katty Schroeder DPM HI Preop/Phase [phone]      Your Vitals Were     Blood Pressure Temperature Respirations Height Weight Pulse Oximetry    113/79 98.5  F (36.9  C) (Temporal) 16 1.695 m (5' 6.75\") 84.5 kg (186 lb 3.2 oz) 97%    BMI (Body Mass Index)                   29.38 kg/m2  "          [employer] Information     [employer] gives you secure access to your electronic health record. If you see a primary care provider, you can also send messages to your care team and make appointments. If you have questions, please call your primary care clinic.  If you do not have a primary care provider, please call [phone] and they will assist you.        Care EveryWhere ID     This is your Care EveryWhere ID. This could be used by other organizations to access your [place] medical records  [id number]        Equal Access to Services     AMBROSIO ROBERTSON : Hadii charan deleon hadasho Soomaali, waaxda luqadaha, qaybta kaalmada adeegyada, lizz gay. So [facility] [phone].    ATENCIÓN: Si habla español, tiene a schulz disposición servicios gratuitos de asistencia lingüística. [facility] [phone].    We comply with applicable federal civil rights laws and [place] laws. We do not discriminate on the basis of race, color, national origin, age, disability, sex, sexual orientation, or gender identity.               Review of your medicines      START taking        Dose / Directions    ondansetron 4 MG ODT tab   Commonly known as:  ZOFRAN ODT   Used for:  Hallux rigidus, right foot        Dose:  4-8 mg   Take 1-2 tablets (4-8 mg) by mouth every 8 hours as needed for nausea or vomiting   Quantity:  40 tablet   Refills:  1       oxyCODONE-acetaminophen 5-325 MG per tablet   Commonly known as:  PERCOCET   Used for:  Hallux rigidus, right foot        Dose:  1 tablet   Take 1 tablet by mouth every 6 hours as needed for pain (Post-op pain)   Quantity:  40 tablet   Refills:  0         CONTINUE these medicines which have NOT CHANGED        Dose / Directions    acyclovir 5 % cream   Commonly known as:  ZOVIRAX   Used for:  HSV (herpes simplex virus) infection        Apply topically 5 times daily   Quantity:  5 g   Refills:  12       escitalopram 10 MG tablet   Commonly known as:  LEXAPRO   Used for:   Major depressive disorder, single episode, moderate (H)        Dose:  10 mg   Take 1 tablet (10 mg) by mouth daily   Quantity:  90 tablet   Refills:  4       ibuprofen 800 MG tablet   Commonly known as:  ADVIL/MOTRIN   Used for:  Post-operative state        Dose:  800 mg   Take 1 tablet (800 mg) by mouth every 8 hours as needed for moderate pain   Quantity:  40 tablet   Refills:  1       levonorgestrel 20 MCG/24HR IUD   Commonly known as:  MIRENA (52 MG)   Used for:  Family planning        Dose:  1 each   1 each (20 mcg) by Intrauterine route continuous   Quantity:  1 each   Refills:  0       levothyroxine 75 MCG tablet   Commonly known as:  SYNTHROID/LEVOTHROID   Used for:  Goiter        Dose:  75 mcg   Take 1 tablet (75 mcg) by mouth daily   Quantity:  90 tablet   Refills:  4       VITAMIN D (CHOLECALCIFEROL) PO        Dose:  6000 Units   Take 6,000 Units by mouth daily   Refills:  0            Where to get your medicines      These medications were sent to [facility] - [address]  [address] [place] [address]     Phone:  [phone]     ondansetron 4 MG ODT tab         Some of these will need a paper prescription and others can be bought over the counter. Ask your nurse if you have questions.     Bring a paper prescription for each of these medications     oxyCODONE-acetaminophen 5-325 MG per tablet                Protect others around you: Learn how to safely use, store and throw away your medicines at www.disposemymeds.org.        Information about OPIOIDS     PRESCRIPTION OPIOIDS: WHAT YOU NEED TO KNOW   We gave you an opioid (narcotic) pain medicine. It is important to manage your pain, but opioids are not always the best choice. You should first try all the other options your care team gave you. Take this medicine for as short a time (and as few doses) as possible.    Some activities can increase your pain, such as bandage changes or therapy sessions. It may help to  take your pain medicine 30 to 60 minutes before these activities. Reduce your stress by getting enough sleep, working on hobbies you enjoy and practicing relaxation or meditation. Talk to your care team about ways to manage your pain beyond prescription opioids.    These medicines have risks:    DO NOT drive when on new or higher doses of pain medicine. These medicines can affect your alertness and reaction times, and you could be arrested for driving under the influence (DUI). If you need to use opioids long-term, talk to your care team about driving.    DO NOT operate heavy machinery    DO NOT do any other dangerous activities while taking these medicines.    DO NOT drink any alcohol while taking these medicines.     If the opioid prescribed includes acetaminophen, DO NOT take with any other medicines that contain acetaminophen. Read all labels carefully. Look for the word  acetaminophen  or  Tylenol.  Ask your pharmacist if you have questions or are unsure.    You can get addicted to pain medicines, especially if you have a history of addiction (chemical, alcohol or substance dependence). Talk to your care team about ways to reduce this risk.    All opioids tend to cause constipation. Drink plenty of water and eat foods that have a lot of fiber, such as fruits, vegetables, prune juice, apple juice and high-fiber cereal. Take a laxative (Miralax, milk of magnesia, Colace, Senna) if you don t move your bowels at least every other day. Other side effects include upset stomach, sleepiness, dizziness, throwing up, tolerance (needing more of the medicine to have the same effect), physical dependence and slowed breathing.    Store your pills in a secure place, locked if possible. We will not replace any lost or stolen medicine. If you don t finish your medicine, please throw away (dispose) as directed by your pharmacist. The [place] Pollution [employer] has more information about safe disposal:  https://www.pca.[facility].[place].us/living-green/managing-unwanted-medications             Medication List: This is a list of all your medications and when to take them. Check marks below indicate your daily home schedule. Keep this list as a reference.      Medications           Morning Afternoon Evening Bedtime As Needed    acyclovir 5 % cream   Commonly known as:  ZOVIRAX   Apply topically 5 times daily                                escitalopram 10 MG tablet   Commonly known as:  LEXAPRO   Take 1 tablet (10 mg) by mouth daily                                ibuprofen 800 MG tablet   Commonly known as:  ADVIL/MOTRIN   Take 1 tablet (800 mg) by mouth every 8 hours as needed for moderate pain                                levonorgestrel 20 MCG/24HR IUD   Commonly known as:  MIRENA (52 MG)   1 each (20 mcg) by Intrauterine route continuous                                levothyroxine 75 MCG tablet   Commonly known as:  SYNTHROID/LEVOTHROID   Take 1 tablet (75 mcg) by mouth daily                                ondansetron 4 MG ODT tab   Commonly known as:  ZOFRAN ODT   Take 1-2 tablets (4-8 mg) by mouth every 8 hours as needed for nausea or vomiting                                oxyCODONE-acetaminophen 5-325 MG per tablet   Commonly known as:  PERCOCET   Take 1 tablet by mouth every 6 hours as needed for pain (Post-op pain)   Last time this was given:  1 tablet on 10/29/2018 10:24 AM                                VITAMIN D (CHOLECALCIFEROL) PO   Take 6,000 Units by mouth daily

## 2018-10-29 NOTE — OP NOTE
[facility] Brief Operative Note    Pre-operative diagnosis: HALLUX RIGIDUS OF RIGHT FOOT, FOOT PAIN RIGHT   Post-operative diagnosis Right hallux rigidus   Procedure: Procedure(s):  RIGHT 1ST METATARSOPHALANGEAL JOINT CHEILECTOMY WITH IMPLANT  CHEILECTOMY   Surgeon: Katty Schroeder DPM   Assistants(s): None   Estimated blood loss: Minimal    Specimens: None   Findings: Right hallux rigidus     PROCEDURE IN DETAIL:  Under mild sedation, the patient was brought into the OR and placed on the operating room table in a supine position.  A pneumatic ankle tourniquet was placed about the patient's right ankle.  Following IV sedation, local anesthesia was obtained about the entire mid foot utilizing a España block technique with 20 mL of 1:1 of 0.5% Marcaine plain and 1% lidocaine plain.  The foot was then scrubbed, prepped and draped in the usual aseptic manner.  An Esmarch bandage was utilized to exsanguinate the patient's right foot and the tourniquet was inflated to 250 mmHg.      Attention was directed to the right dorsal medial aspect of the first metatarsal phalangeal joint of the right foot where a 5 cm linear, longitudinal incision was made medial and parallel to the tendon of the extensor hallucis longus and involved the contour of the deformity.  The incision was deepened through the subcutaneous tissues using sharp and blunt dissection.  Care was taken to identify and retract all vital neural and vascular structures.  All bleeders were ligated and cauterized as necessary.  At this time, a linear capsulotomy was performed over the dorsal medial aspect of the first metatarsal phalangeal joint.  The periosteal and capsular structures were then carefully dissected free of their osseous attachments and reflected medially and laterally, thus exposing the head of the first metatarsal at the operative site.      Next, utilizing an oscillating bone saw, the dorsal, lateral and medial prominent osteophytes were  resected from the metatarsal head and the base of the proximal phalanx with a mild V-like cut between the proximal phalanx and the metatarsal head. The osteophytes were passed from the operative field.      Attention was then directed to the head of the 1st metatarsal where a Cartiva placer was positioned 1-2mm dorsal to the central aspect of the metatarsal head. A guide pin was then placed into the placer and advanced perpendicular into the metatarsal head. The placer was then removed and the position was checked on fluoroscopy and noted to be in good alignment. A cannulated drill was then used to drill into the metatarsal head and advanced until the drill was 1-2 mm short of being flush with the metatarsal head surface. The drill bit and guide pin where then carefully removed from the implant site. The implant site was then carefully flushed with normal, sterile saline and all debris was removed from the implant site. The distal end of the Introducer tube was then placed at the implant site and perpendicular to the metatarsal head. Pressure was applied to the placer while maintaining the distal end of the implant site and the implant was press fit into the metatarsal head site. The implant was noted to be too prominent, so the reamer was again used to carefully ream the implant site deeper. The implant site was carefully flushed with normal, sterile saline with no debris noted within the implant site. The implant was again press fit into the metatarsal head and the implant was noted to be 1 to 1.5mm proud with good placement. Range of motion of the 1st MTPJ was noted to be increased with no restriction or limitation in range of motion. The operative site was again thoroughly flushed with normal, sterile saline.    The periosteal and capsular structures were reapproximated and coapted using 3-0 Vicryl. The subcuticular tissues were then reapproximated using 4-0 Vicryl and the skin was reapproximated using 4-0  Prolene using a vertical mattress suture technique.  The operative site was then dressed with Xeroform gauze and a dry sterile dressing consisting of 4 x 4s, fluffs, cast padding, Kerlix, and Coban.  The pneumatic ankle tourniquet was then deflated after a total of 62 minutes and a prompt hyperemic response was noted to all digits of the right foot.  A CAM boot was applied to the foot.    The patient tolerated the procedure and anesthesia well and was transferred to the recovery room with vital signs stable and vascular status intact to all toes of the rightfoot.  Following a period of postoperative monitoring, the patient was discharged home with written and oral postoperative instructions. The patient has been instructed to remain partial-weightbearing while only wearing a CAM boot. The patient has Percocet (5/325mg) x 40 tablets for pain and is to call the podiatry clinic with any concerns. The patient is to leave the sterile dressing clean, dry and intact and will present to the first post-operative appointment with podiatry on Friday, 11/02/2018.

## 2018-10-29 NOTE — IP AVS SNAPSHOT
[facility] Preop/Phase II    [address] [address]    Phone:  [phone]                                       After Visit Summary   10/29/2018    Sheridan Roldan    MRN: [id number]           After Visit Summary Signature Page     I have received my discharge instructions, and my questions have been answered. I have discussed any challenges I see with this plan with the nurse or doctor.    ..........................................................................................................................................  Patient/Patient Representative Signature      ..........................................................................................................................................  Patient Representative Print Name and Relationship to Patient    ..................................................               ................................................  Date                                   Time    ..........................................................................................................................................  Reviewed by Signature/Title    ...................................................              ..............................................  Date                                               Time          22EPIC Rev 08/18

## 2018-10-29 NOTE — OR NURSING
I have given crutches to the patient, adjusted them and provided complete instructions on safe use.  Jaimie Sal

## 2018-10-29 NOTE — OR NURSING
Discharge instructions discussed with Pt and family members. Eating, drinking, and tolerating well. Denies pain at discharge. Percocet 5/325 PO one tablet given prior to discharge. Tolerated well. [facility] filled prescriptions, and sent home with Pt. Zohreh 20. Dressed with assistance from SO, and escorted to BR. Assisted with minimal assist, and escorted to ER entrance via wheelchair.

## 2018-10-29 NOTE — ANESTHESIA CARE TRANSFER NOTE
Patient: Sheridan Roldan    Procedure(s):  RIGHT 1ST METATARSOPHALANGEAL JOINT CHEILECTOMY WITH IMPLANT  CHEILECTOMY    Diagnosis: HALLUX RIGIDUS OF RIGHT FOOT, FOOT PAIN RIGHT  Diagnosis Additional Information: No value filed.    Anesthesia Type:   MAC     Note:  Airway :Nasal Cannula  Patient transferred to:Phase II  Comments: Patient denies pain and discomfort. Report to KARLEE Watts.Handoff Report: Identifed the Patient, Identified the Reponsible Provider, Reviewed the pertinent medical history, Discussed the surgical course, Reviewed Intra-OP anesthesia mangement and issues during anesthesia, Set expectations for post-procedure period and Allowed opportunity for questions and acknowledgement of understanding      Vitals: (Last set prior to Anesthesia Care Transfer)    CRNA VITALS  10/29/2018 0858 - 10/29/2018 0938      10/29/2018             Resp Rate (observed): (!)  1                Electronically Signed By: REYNA Redding CRNA  October 29, 2018  9:38 AM

## 2018-10-29 NOTE — DISCHARGE SUMMARY
Physician Discharge Summary     Patient ID:  Sheridan Roldan  [id number]  38 year old  1980    Admit date: 10/29/2018    Discharge date and time: 10/29/2018 at 10:00 a.m.     Admitting Physician: Katty Schroeder DPM     Discharge Physician: Dr. Katty Schroeder DPM    Admission Diagnoses: HALLUX RIGIDUS OF RIGHT FOOT, FOOT PAIN RIGHT    Discharge Diagnoses: RIGHT hallux rigidus    Admission Condition: good    Discharged Condition: good    Indication for Admission: None    Hospital Course: None    Consults: none    Significant Diagnostic Studies: None    Treatments: surgery: See op note    Discharge Exam:  None    Disposition: home    Patient Instructions:   Current Discharge Medication List      START taking these medications    Details   ondansetron (ZOFRAN ODT) 4 MG ODT tab Take 1-2 tablets (4-8 mg) by mouth every 8 hours as needed for nausea or vomiting  Qty: 40 tablet, Refills: 1    Associated Diagnoses: Hallux rigidus, right foot      oxyCODONE-acetaminophen (PERCOCET) 5-325 MG per tablet Take 1 tablet by mouth every 6 hours as needed for pain (Post-op pain)  Qty: 40 tablet, Refills: 0    Associated Diagnoses: Hallux rigidus, right foot         CONTINUE these medications which have NOT CHANGED    Details   escitalopram (LEXAPRO) 10 MG tablet Take 1 tablet (10 mg) by mouth daily  Qty: 90 tablet, Refills: 4    Associated Diagnoses: Major depressive disorder, single episode, moderate (H)      ibuprofen (ADVIL/MOTRIN) 800 MG tablet Take 1 tablet (800 mg) by mouth every 8 hours as needed for moderate pain  Qty: 40 tablet, Refills: 1    Associated Diagnoses: Post-operative state      levothyroxine (SYNTHROID/LEVOTHROID) 75 MCG tablet Take 1 tablet (75 mcg) by mouth daily  Qty: 90 tablet, Refills: 4    Associated Diagnoses: Goiter      VITAMIN D, CHOLECALCIFEROL, PO Take 6,000 Units by mouth daily      acyclovir (ZOVIRAX) 5 % cream Apply topically 5 times daily  Qty: 5 g, Refills: 12    Associated  Diagnoses: HSV (herpes simplex virus) infection      levonorgestrel (MIRENA, 52 MG,) 20 MCG/24HR IUD 1 each (20 mcg) by Intrauterine route continuous  Qty: 1 each, Refills: 0    Associated Diagnoses: Family planning           Activity: CAM boot to be worn at all times and crutches used as often as possible  Diet: regular diet  Wound Care: keep wound clean and dry    Follow-up with podiatry on Friday, 11/02/2018.    Signed:  Katty Schroeder  10/29/2018  9:50 AM

## 2018-11-02 ENCOUNTER — OFFICE VISIT (OUTPATIENT)
Dept: PODIATRY | Facility: OTHER | Age: 38
End: 2018-11-02
Attending: PODIATRIST
Payer: COMMERCIAL

## 2018-11-02 ENCOUNTER — HOSPITAL ENCOUNTER (OUTPATIENT)
Dept: GENERAL RADIOLOGY | Facility: HOSPITAL | Age: 38
Discharge: HOME | End: 2018-11-02
Attending: PODIATRIST | Admitting: PODIATRIST
Payer: COMMERCIAL

## 2018-11-02 VITALS — TEMPERATURE: 98.6 F | SYSTOLIC BLOOD PRESSURE: 123 MMHG | HEART RATE: 79 BPM | DIASTOLIC BLOOD PRESSURE: 85 MMHG

## 2018-11-02 DIAGNOSIS — M79.671 FOOT PAIN, RIGHT: ICD-10-CM

## 2018-11-02 DIAGNOSIS — M20.21 HALLUX RIGIDUS OF RIGHT FOOT: Primary | ICD-10-CM

## 2018-11-02 DIAGNOSIS — M20.21 HALLUX RIGIDUS OF RIGHT FOOT: ICD-10-CM

## 2018-11-02 PROCEDURE — 73630 X-RAY EXAM OF FOOT: CPT | Mod: TC,RT

## 2018-11-02 PROCEDURE — 99024 POSTOP FOLLOW-UP VISIT: CPT | Performed by: PODIATRIST

## 2018-11-02 ASSESSMENT — PAIN SCALES - GENERAL: PAINLEVEL: NO PAIN (0)

## 2018-11-02 NOTE — PROGRESS NOTES
Chief complaint: Patient presents with:  Surgical Followup: right foot bunion        History of Present Illness: This 38 year old female is seen for her first post-op appointment (POD 4) after a RIGHT hallux Cheilectomy and Cartiva implant (DOS Monday, 10/29/2018). She had an x-ray taken right before her podiatry appointment today. She presents in a CAM walker boot with crutches. She took Vicodin for pain for two days after the surgery then switched to Tylenol, but lately she has not been taking anything for pain and she relates that she has no pain. She is wondering when she will start wearing her compression socks. She denies F/C/N/V/SOB/CP/calf pain. She has no other pedal complaints at today's appointment.       /85 (BP Location: Right arm, Patient Position: Sitting, Cuff Size: Adult Regular)  Pulse 79  Temp 98.6  F (37  C) (Tympanic)    Patient Active Problem List   Diagnosis     Family planning     Encounter for routine gynecological examination     Fatigue     Mastodynia     Goiter     Chronic tonsillitis     Tonsillar hypertrophy     Papanicolaou smear of cervix with low grade squamous intraepithelial lesion (LGSIL)     High risk HPV infection     RC II (cervical intraepithelial neoplasia II)     NO SHOW     Papanicolaou smear of cervix with atypical squamous cells of undetermined significance (ASC-US)     History of bilateral fallopian tube excision     Multiple joint pain     Throat pain       Past Surgical History:   Procedure Laterality Date     ABDOMINOPLASTY       BUNIONECTOMY Right 10/29/2018    Procedure: RIGHT 1ST METATARSOPHALANGEAL JOINT CHEILECTOMY WITH IMPLANT;  Surgeon: Katty Schreoder DPM;  [facility]     CHEILECTOMY Right 10/29/2018    Procedure: CHEILECTOMY;  Surgeon: Katty Schroeder DPM;  [facility]     CONIZATION LEEP N/A 8/1/2016    Procedure: CONIZATION LEEP;  Surgeon: Chioma Wiggins MD;  [facility]     LAPAROSCOPIC SALPINGECTOMY Bilateral 11/22/2017     Procedure: LAPAROSCOPIC SALPINGECTOMY;  LAPAROSCOPIC BILATERAL SALPINGECTOMY;  Surgeon: Kalin Hernandez MD;  [facility]     MAMMOPLASTY AUGMENTATION BILATERAL         Current Outpatient Prescriptions   Medication     escitalopram (LEXAPRO) 10 MG tablet     ibuprofen (ADVIL/MOTRIN) 800 MG tablet     levonorgestrel (MIRENA, 52 MG,) 20 MCG/24HR IUD     levothyroxine (SYNTHROID/LEVOTHROID) 75 MCG tablet     ondansetron (ZOFRAN ODT) 4 MG ODT tab     VITAMIN D, CHOLECALCIFEROL, PO     acyclovir (ZOVIRAX) 5 % cream     oxyCODONE-acetaminophen (PERCOCET) 5-325 MG per tablet     No current facility-administered medications for this visit.         Allergies   Allergen Reactions     Erythromycin Nausea and Vomiting     Erythromycin base     Penicillins Rash       Family History   Problem Relation Age of Onset     Hypertension Father      Other - See Comments Father      lymphoma     Uterine Cancer Cousin        Social History     Social History     Marital status:      Spouse name: N/A     Number of children: N/A     Years of education: N/A     Occupational History     Rn [employer]     Social History Main Topics     Smoking status: Former Smoker     Quit date: 1/1/2002     Smokeless tobacco: Never Used     Alcohol use Yes      Comment: socially     Drug use: No     Sexual activity: Yes     Partners: Male     Other Topics Concern     Caffeine Concern Yes     coffee 3 cups     Parent/Sibling W/ Cabg, Mi Or Angioplasty Before 65f 55m? No     Social History Narrative       Psychiatric: mentation appears normal and affect normal/bright    Constitutional: healthy, alert and no distress    RIGHT FOOT FOCUSED        VASCULAR:  -Dorsalis pedis pulse weakly palpable 2/2 mild post-op edema   -Posterior tibial pulse weakly palpable 2/2 mild post-op edema  -Hair growth present to dorsal foot  -CAPILLARY FILL TIME < 3 seconds to hallux  -Mild non-pitting edema to RIGHT foot -- par with post-operative  course  NEURO:  -Light touch sensation intact to plantar forefoot  DERM:  -Incision with sutures over RIGHT foot 1st ray. Incision is intact with skin edges well-approximate without any signs of dehiscence. Crista-incision has mild erythema, edema, and calor --par with post-operative course. No malodor, no purulence, no moderate color, no moderate or ascending erythema, no moderate edema, no other SOI.    MSK:  -Zero pain with palpation around incision--par with post-operative course.  -Patient able to actively dorsiflex and plantarflex her RIGHT hallux  ============================================================    ASSESSMENT:  (M20.21) Hallux rigidus of right foot  (primary encounter diagnosis)      PLAN:  -Patient evaluated and examined. Treatment options discussed with no educational barriers noted.  -Reviewed today's RIGHT foot radiographs with patient   -Patient denies pain and denies F/C/N/V/SOB/CP/Calf pain  today. She has been instructed to present to the emergency room with any signs of calf pain or shortness of breath. She acknowledges that she understands and will comply with this instruction if she experiences any s/s of DVT or PE.  -Incision site cleansed with wound  and dressed with Xeroform, gauze, Kerlix and coban. Leftover supplies dispensed to patient in case she needs a dressing change at home. Patient is to otherwise leave her dressing C/D/I until her next follow-up appointment.  -Patient may start walking with her CAM boot without crutches a week after her surgical date. She is not to remove the CAM boot while walking and she is to slowly transition from crutch walking to walking with the boot without crutches. She will start ROM exercises of the 1st MTPJ after the sutures are removed.  -Patient in agreement with the above treatment plan and all of patient's questions were answered.      RTC 1.5 weeks on Tuesday, 11/13/2018 for suture removal        Katty Schroeder DPM

## 2018-11-02 NOTE — NURSING NOTE
"Chief Complaint   Patient presents with     Surgical Followup     right foot bunion       Initial There were no vitals taken for this visit. Estimated body mass index is 29.38 kg/(m^2) as calculated from the following:    Height as of 10/29/18: 5' 6.75\" (1.695 m).    Weight as of 10/29/18: 186 lb 3.2 oz (84.5 kg).  Medication Reconciliation: complete    Erika Thibodeaux LPN  "

## 2018-11-02 NOTE — MR AVS SNAPSHOT
After Visit Summary   11/2/2018    Sheridan Roldan    MRN: [id number]           Patient Information     Date Of Birth          1980        Visit Information        Provider Department      11/2/2018 8:15 AM Katty Schroeder DPM [facility]        Today's Diagnoses     Hallux rigidus of right foot    -  1       Follow-ups after your visit        Your next 10 appointments already scheduled     Nov 13, 2018 11:15 AM CST   (Arrive by 11:00 AM)   Post Op with Katty Schroeder DPM   [facility]    [address]  [place] [address] [phone]            José Luis 10, 2019 10:00 AM CDT   (Arrive by 9:45 AM)   PHYSICAL with REYNA Ayala CNM   [facility] ([facility] )    [address]  [place] [address]   [phone]              Who to contact     If you have questions or need follow up information about today's clinic visit or your schedule please contact [facility] directly at [phone].  Normal or non-critical lab and imaging results will be communicated to you by MyChart, letter or phone within 4 business days after the clinic has received the results. If you do not hear from us within 7 days, please contact the clinic through MyChart or phone. If you have a critical or abnormal lab result, we will notify you by phone as soon as possible.  Submit refill requests through Moodsnap or call your pharmacy and they will forward the refill request to us. Please allow 3 business days for your refill to be completed.          Additional Information About Your Visit        Park[employer] Information     Moodsnap gives you secure access to your electronic health record. If you see a primary care provider, you can also send messages to your care team and make appointments. If you have questions, please call your primary care clinic.  If you do  not have a primary care provider, please call [phone] and they will assist you.        Care EveryWhere ID     This is your Care EveryWhere ID. This could be used by other organizations to access your [place] medical records  [id number]        Your Vitals Were     Pulse Temperature                79 98.6  F (37  C) (Tympanic)           Blood Pressure from Last 3 Encounters:   11/02/18 123/85   10/29/18 103/77   10/09/18 125/75    Weight from Last 3 Encounters:   10/29/18 186 lb 3.2 oz (84.5 kg)   10/09/18 184 lb 8 oz (83.7 kg)   09/14/18 180 lb (81.6 kg)              Today, you had the following     No orders found for display       Primary Care Provider Office Phone # Fax #    SARAH Bond [phone]       [address] [place] [address]        Equal Access to Services     [facility] : Hadii aad ku hadasho Soomaali, waaxda luqadaha, qaybta kaalmada adeegyada, waxay venicein haynaten jennifer herring . So [facility] [phone].    ATENCIÓN: Si habla español, tiene a schulz disposición servicios gratuitos de asistencia lingüística. Llame al [phone].    We comply with applicable federal civil rights laws and [place] laws. We do not discriminate on the basis of race, color, national origin, age, disability, sex, sexual orientation, or gender identity.            Thank you!     Thank you for choosing [facility]  for your care. Our goal is always to provide you with excellent care. Hearing back from our patients is one way we can continue to improve our services. Please take a few minutes to complete the written survey that you may receive in the mail after your visit with us. Thank you!             Your Updated Medication List - Protect others around you: Learn how to safely use, store and throw away your medicines at www.disposemymeds.org.          This list is accurate as of 11/2/18  8:59 AM.  Always use your most recent med list.                    Brand Name Dispense Instructions for use Diagnosis    acyclovir 5 % cream    ZOVIRAX    5 g    Apply topically 5 times daily    HSV (herpes simplex virus) infection       escitalopram 10 MG tablet    LEXAPRO    90 tablet    Take 1 tablet (10 mg) by mouth daily    Major depressive disorder, single episode, moderate (H)       ibuprofen 800 MG tablet    ADVIL/MOTRIN    40 tablet    Take 1 tablet (800 mg) by mouth every 8 hours as needed for moderate pain    Post-operative state       levonorgestrel 20 MCG/24HR IUD    MIRENA (52 MG)    1 each    1 each (20 mcg) by Intrauterine route continuous    Family planning       levothyroxine 75 MCG tablet    SYNTHROID/LEVOTHROID    90 tablet    Take 1 tablet (75 mcg) by mouth daily    Goiter       ondansetron 4 MG ODT tab    ZOFRAN ODT    40 tablet    Take 1-2 tablets (4-8 mg) by mouth every 8 hours as needed for nausea or vomiting    Hallux rigidus, right foot       oxyCODONE-acetaminophen 5-325 MG per tablet    PERCOCET    40 tablet    Take 1 tablet by mouth every 6 hours as needed for pain (Post-op pain)    Hallux rigidus, right foot       VITAMIN D (CHOLECALCIFEROL) PO      Take 6,000 Units by mouth daily

## 2018-11-08 ENCOUNTER — OFFICE VISIT (OUTPATIENT)
Dept: OBGYN | Facility: OTHER | Age: 38
End: 2018-11-08
Attending: ADVANCED PRACTICE MIDWIFE
Payer: COMMERCIAL

## 2018-11-08 VITALS
BODY MASS INDEX: 29.19 KG/M2 | DIASTOLIC BLOOD PRESSURE: 64 MMHG | SYSTOLIC BLOOD PRESSURE: 108 MMHG | HEIGHT: 67 IN | WEIGHT: 186 LBS

## 2018-11-08 DIAGNOSIS — Z11.1 SCREENING EXAMINATION FOR PULMONARY TUBERCULOSIS: ICD-10-CM

## 2018-11-08 DIAGNOSIS — Z02.0 ENCOUNTER FOR SCHOOL HISTORY AND PHYSICAL EXAMINATION: Primary | ICD-10-CM

## 2018-11-08 DIAGNOSIS — Z23 NEED FOR PROPHYLACTIC VACCINATION AND INOCULATION AGAINST INFLUENZA: ICD-10-CM

## 2018-11-08 PROCEDURE — 86480 TB TEST CELL IMMUN MEASURE: CPT | Mod: 90 | Performed by: ADVANCED PRACTICE MIDWIFE

## 2018-11-08 PROCEDURE — 36415 COLL VENOUS BLD VENIPUNCTURE: CPT | Performed by: ADVANCED PRACTICE MIDWIFE

## 2018-11-08 PROCEDURE — 99000 SPECIMEN HANDLING OFFICE-LAB: CPT | Performed by: ADVANCED PRACTICE MIDWIFE

## 2018-11-08 PROCEDURE — 90471 IMMUNIZATION ADMIN: CPT | Performed by: ADVANCED PRACTICE MIDWIFE

## 2018-11-08 PROCEDURE — 86787 VARICELLA-ZOSTER ANTIBODY: CPT | Mod: 90 | Performed by: ADVANCED PRACTICE MIDWIFE

## 2018-11-08 PROCEDURE — 99214 OFFICE O/P EST MOD 30 MIN: CPT | Mod: 25 | Performed by: ADVANCED PRACTICE MIDWIFE

## 2018-11-08 PROCEDURE — 90686 IIV4 VACC NO PRSV 0.5 ML IM: CPT | Performed by: ADVANCED PRACTICE MIDWIFE

## 2018-11-08 ASSESSMENT — PAIN SCALES - GENERAL: PAINLEVEL: NO PAIN (0)

## 2018-11-08 NOTE — NURSING NOTE
"Chief Complaint   Patient presents with     School Physical       Initial /64 (BP Location: Right arm, Patient Position: Chair, Cuff Size: Adult Regular)  Ht 5' 6.75\" (1.695 m)  Wt 186 lb (84.4 kg)  BMI 29.35 kg/m2 Estimated body mass index is 29.35 kg/(m^2) as calculated from the following:    Height as of this encounter: 5' 6.75\" (1.695 m).    Weight as of this encounter: 186 lb (84.4 kg).  Medication Reconciliation: complete    Dinah Trammell LPN    "

## 2018-11-08 NOTE — PROGRESS NOTES

## 2018-11-08 NOTE — PROGRESS NOTES
"Sheridan Roldan is a 38 year old female  Here for a school physical    Medications, allergies, and social history reviewed in EPIC.    Note:  Pt currently has a post-surgical boot on right foot.  Pt is scheduled to be released for work on 18.       O:   /64 (BP Location: Right arm, Patient Position: Chair, Cuff Size: Adult Regular)  Ht 5' 6.75\" (1.695 m)  Wt 186 lb (84.4 kg)  BMI 29.35 kg/m2         Past Medical History:   Diagnosis Date     Anxiety state, unspecified 3/12/2007     Breech presentation without mention of version, antepartum 1/10/2006     Galactorrhea not associated with childbirth 05/10/2005     Leukorrhea, not specified as infective 2005     Post term pregnancy, antepartum condition or complication 2006     Spotting complicating pregnancy, antepartum condition or complication 2008     Supervision of other normal pregnancy 2005     Threatened , antepartum 2007     Past Surgical History:   Procedure Laterality Date     ABDOMINOPLASTY       BUNIONECTOMY Right 10/29/2018    Procedure: RIGHT 1ST METATARSOPHALANGEAL JOINT CHEILECTOMY WITH IMPLANT;  Surgeon: Katty Schroeder DPM;  [facility]     CHEILECTOMY Right 10/29/2018    Procedure: CHEILECTOMY;  Surgeon: Katty Schroeder DPM;  [facility]     CONIZATION LEEP N/A 2016    Procedure: CONIZATION LEEP;  Surgeon: Chioma Wiggins MD;  [facility]     LAPAROSCOPIC SALPINGECTOMY Bilateral 2017    Procedure: LAPAROSCOPIC SALPINGECTOMY;  LAPAROSCOPIC BILATERAL SALPINGECTOMY;  Surgeon: Kalin Hernandez MD;  [facility]     MAMMOPLASTY AUGMENTATION BILATERAL       ROS:  CONSTITUTIONAL: NEGATIVE for fever, chills, change in weight  RESP: NEGATIVE for significant cough or SOB  CV: NEGATIVE for chest pain, palpitations or peripheral edema  GI: NEGATIVE for nausea, abdominal pain, heartburn, or change in bowel habits  : NEGATIVE for frequency, dysuria, hematuria, vaginal " "discharge  PSYCHIATRIC: NEGATIVE for changes in mood or affect    EXAM:   /64 (BP Location: Right arm, Patient Position: Chair, Cuff Size: Adult Regular)  Ht 5' 6.75\" (1.695 m)  Wt 186 lb (84.4 kg)  BMI 29.35 kg/m2   BMI: Body mass index is 29.35 kg/(m^2).  Constitutional: healthy, alert and no distress  Head: Normocephalic. No masses, lesions, tenderness or abnormalities  Neck: Neck supple. Trachea midline. No adenopathy. Thyroid symmetric, normal size.   Cardiovascular: RRR.   Respiratory: lungs clear   Breast: Breasts reveal mild symmetric fibrocystic densities, but there are no dominant, discrete, fixed or suspicious masses found.  Gastrointestinal: Abdomen soft, non-tender, non-distended. No masses, organomegaly.  Pelvic  Deferred/completed on 8/22/18  Rectal Exam: deferred  Musculoskeletal: extremities normal  Skin: no suspicious lesions or rashes  Psychiatric: Affect appropriate, cooperative,mentation appears normal.    A:    School physical  Post-surgical boot on right foot    P:   Exam and history reviewed   Boot to be removed and release to work by 12/11/18  Varicella titer  TB quantiferon  Flu shot    Total visit greater than 30 minutes with 25 minutes spent face to face counseling this patient preventative health care, healthy diet, exercise, routine physicals, screenings, and follow ups.    REYNA Koch, CNM                "

## 2018-11-08 NOTE — PATIENT INSTRUCTIONS
Return to office as needed.    Thank you for allowing Alejandro ORELLANA CNM and our OB team to participate in your care.  If you have a scheduling or an appointment question please contact [facility] Unit Coordinator at their direct line [phone].   ALL nursing questions or concerns can be directed to your OB nurse at: [phone] Honorio Nix/Debra

## 2018-11-08 NOTE — MR AVS SNAPSHOT
After Visit Summary   11/8/2018    Sheridan Roldan    MRN: [id number]           Patient Information     Date Of Birth          1980        Visit Information        Provider Department      11/8/2018 10:00 AM Alejandro Kee APRN CNM [facility]        Today's Diagnoses     Encounter for school history and physical examination    -  1    Screening examination for pulmonary tuberculosis        Need for prophylactic vaccination and inoculation against influenza          Care Instructions    Return to office as needed.    Thank you for allowing Alejandro ORELLANA CNM and our OB team to participate in your care.  If you have a scheduling or an appointment question please contact [facility] Unit Coordinator at their direct line [phone].   ALL nursing questions or concerns can be directed to your OB nurse at: [phone] Honorio Nix/Debra               Follow-ups after your visit        Your next 10 appointments already scheduled     Nov 13, 2018 11:15 AM CST   (Arrive by 11:00 AM)   Post Op with Katty Schroeder DPM   [facility] ([facility] )    [address] [place] [address] [phone]            José Luis 10, 2019 10:00 AM CDT   (Arrive by 9:45 AM)   PHYSICAL with REYNA Ayala CNM   [facility] ([facility] )    [address] [place] [id number] [phone]              Who to contact     If you have questions or need follow up information about today's clinic visit or your schedule please contact [facility] directly at [phone].  Normal or non-critical lab and imaging results will be communicated to you by MyChart, letter or phone within 4 business days after the clinic has received the results. If you do not hear from us within 7 days, please contact the clinic through MyChart or phone. If you have a critical or abnormal lab  "result, we will notify you by phone as soon as possible.  Submit refill requests through GAMEVIL or call your pharmacy and they will forward the refill request to us. Please allow 3 business days for your refill to be completed.          Additional Information About Your Visit        [employer] Information     GAMEVIL gives you secure access to your electronic health record. If you see a primary care provider, you can also send messages to your care team and make appointments. If you have questions, please call your primary care clinic.  If you do not have a primary care provider, please call [phone] and they will assist you.        Care EveryWhere ID     This is your Care EveryWhere ID. This could be used by other organizations to access your [facility] medical records  [id number]        Your Vitals Were     Height BMI (Body Mass Index)                5' 6.75\" (1.695 m) 29.35 kg/m2           Blood Pressure from Last 3 Encounters:   11/08/18 108/64   11/02/18 123/85   10/29/18 103/77    Weight from Last 3 Encounters:   11/08/18 186 lb (84.4 kg)   10/29/18 186 lb 3.2 oz (84.5 kg)   10/09/18 184 lb 8 oz (83.7 kg)              We Performed the Following     ADMIN 1st VACCINE     HC FLU VAC PRESRV FREE QUAD SPLIT VIR 3+YRS IM     Quantiferon TB Gold Plus     Varicella Zoster Virus Antibody IgG        Primary Care Provider Office Phone # Fax #    Danielle SARAH Bob [phone]       [address] [place] [phone]        Equal Access to Services     [facility] : Hadii aad ku hadasho Soomaali, waaxda luqadaha, qaybta kaalmada adeegyada, waxay venicein gin herring . So [facility] [phone].    ATENCIÓN: Si abnerla emily, tiene a schulz disposición servicios gratuitos de asistencia lingüística. Llame al [phone].    We comply with applicable federal civil rights laws and [place] laws. We do not discriminate on the basis of race, color, national origin, age, disability, sex, " sexual orientation, or gender identity.            Thank you!     Thank you for choosing [facility]  for your care. Our goal is always to provide you with excellent care. Hearing back from our patients is one way we can continue to improve our services. Please take a few minutes to complete the written survey that you may receive in the mail after your visit with us. Thank you!             Your Updated Medication List - Protect others around you: Learn how to safely use, store and throw away your medicines at www.disposemymeds.org.          This list is accurate as of 11/8/18  3:25 PM.  Always use your most recent med list.                   Brand Name Dispense Instructions for use Diagnosis    acyclovir 5 % cream    ZOVIRAX    5 g    Apply topically 5 times daily    HSV (herpes simplex virus) infection       escitalopram 10 MG tablet    LEXAPRO    90 tablet    Take 1 tablet (10 mg) by mouth daily    Major depressive disorder, single episode, moderate (H)       ibuprofen 800 MG tablet    ADVIL/MOTRIN    40 tablet    Take 1 tablet (800 mg) by mouth every 8 hours as needed for moderate pain    Post-operative state       levonorgestrel 20 MCG/24HR IUD    MIRENA (52 MG)    1 each    1 each (20 mcg) by Intrauterine route continuous    Family planning       levothyroxine 75 MCG tablet    SYNTHROID/LEVOTHROID    90 tablet    Take 1 tablet (75 mcg) by mouth daily    Goiter       ondansetron 4 MG ODT tab    ZOFRAN ODT    40 tablet    Take 1-2 tablets (4-8 mg) by mouth every 8 hours as needed for nausea or vomiting    Hallux rigidus, right foot       VITAMIN D (CHOLECALCIFEROL) PO      Take 6,000 Units by mouth daily

## 2018-11-09 LAB — VZV IGG SER QL IA: 2.4 AI (ref 0–0.8)

## 2018-11-11 LAB
GAMMA INTERFERON BACKGROUND BLD IA-ACNC: 0.02 IU/ML
M TB IFN-G BLD-IMP: NEGATIVE
M TB IFN-G CD4+ BCKGRND COR BLD-ACNC: 8.11 IU/ML
MITOGEN IGNF BCKGRD COR BLD-ACNC: 0.01 IU/ML
MITOGEN IGNF BCKGRD COR BLD-ACNC: 0.01 IU/ML

## 2018-11-13 ENCOUNTER — OFFICE VISIT (OUTPATIENT)
Dept: PODIATRY | Facility: OTHER | Age: 38
End: 2018-11-13
Attending: PODIATRIST
Payer: COMMERCIAL

## 2018-11-13 VITALS
SYSTOLIC BLOOD PRESSURE: 110 MMHG | DIASTOLIC BLOOD PRESSURE: 62 MMHG | TEMPERATURE: 97.9 F | OXYGEN SATURATION: 98 % | HEART RATE: 80 BPM

## 2018-11-13 DIAGNOSIS — M79.671 FOOT PAIN, RIGHT: ICD-10-CM

## 2018-11-13 DIAGNOSIS — M20.21 HALLUX RIGIDUS OF RIGHT FOOT: Primary | ICD-10-CM

## 2018-11-13 PROCEDURE — 99024 POSTOP FOLLOW-UP VISIT: CPT | Performed by: PODIATRIST

## 2018-11-13 ASSESSMENT — PAIN SCALES - GENERAL: PAINLEVEL: NO PAIN (0)

## 2018-11-13 NOTE — MR AVS SNAPSHOT
After Visit Summary   11/13/2018    Sheridan Roldan    MRN: [id number]           Patient Information     Date Of Birth          1980        Visit Information        Provider Department      11/13/2018 11:15 AM Katty Schroeder DPM [facility]        Today's Diagnoses     Hallux rigidus of right foot    -  1    Foot pain, right           Follow-ups after your visit        Additional Services     PHYSICAL THERAPY REFERRAL       If you have not heard from the scheduling office within 2 business days, please call [phone] for all locations, with the exception of [place], please call [phone] and [place], please call [phone].    Please be aware that coverage of these services is subject to the terms and limitations of your health insurance plan.  Call member services at your health plan with any benefit or coverage questions.    Post-op Rt hallux Cheilectomy / 1st MTPJ clean-up with Cartiva implant placement. Patient is two weeks post-op. She does not have screws or plates in the bone. She has been instructed to start ROM of the MTPJ to prevent adhesions within the joint. Please work on 1st MTPJ ROM of the RIGHT foot with iontophoresis and other recommended modalities to prevent fibrous adhesions within the joint. She will be in a CAM walker boot one more week before she will start transitioning to a stable tennis shoe.                  Your next 10 appointments already scheduled     Nov 27, 2018 11:30 AM CST   (Arrive by 11:15 AM)   Post Op with Katty Schroeder DPM   [facility] [place] ([facility] )    [address] [place] [address]  [place] [id number] [phone]            José Luis 10, 2019 10:00 AM CDT   (Arrive by 9:45 AM)   PHYSICAL with REYNA Ayala CNM   [facility] ([facility] )    [address] [place] [address]  [place] [address]   [phone]              Future tests that  were ordered for you today     Open Future Orders        Priority Expected Expires Ordered    XR FOOT RT G/E 3 VW (Clinic Performed) Routine 11/27/2018 11/13/2019 11/13/2018    PHYSICAL THERAPY REFERRAL Routine  11/13/2019 11/13/2018            Who to contact     If you have questions or need follow up information about today's clinic visit or your schedule please contact [facility] directly at [phone].  Normal or non-critical lab and imaging results will be communicated to you by [employer], letter or phone within 4 business days after the clinic has received the results. If you do not hear from us within 7 days, please contact the clinic through [employer] or phone. If you have a critical or abnormal lab result, we will notify you by phone as soon as possible.  Submit refill requests through [email or web address] or call your pharmacy and they will forward the refill request to us. Please allow 3 business days for your refill to be completed.          Additional Information About Your Visit        [employer] Information     [email or web address] gives you secure access to your electronic health record. If you see a primary care provider, you can also send messages to your care team and make appointments. If you have questions, please call your primary care clinic.  If you do not have a primary care provider, please call [phone] and they will assist you.        Care EveryWhere ID     This is your Care EveryWhere ID. This could be used by other organizations to access your [facility] medical records  [id number]        Your Vitals Were     Pulse Temperature Pulse Oximetry             80 97.9  F (36.6  C) (Tympanic) 98%          Blood Pressure from Last 3 Encounters:   11/13/18 110/62   11/08/18 108/64   11/02/18 123/85    Weight from Last 3 Encounters:   11/08/18 186 lb (84.4 kg)   10/29/18 186 lb 3.2 oz (84.5 kg)   10/09/18 184 lb 8 oz (83.7 kg)              We Performed the Following     REMOVAL OF SUTURES         Primary Care Provider Office Phone # Fax #    SARAH Bond [phone]       [facility] [address] [address]        Equal Access to Services     AMBROSIO ROBERTSON : Hadalex charan deleon salvadoro Dimitris, walitzyda luqadaha, qaybta kaalmada dean, lizz greenfield maerufus rivera nevaeh gay. So [facility] [phone].    ATENCIÓN: Si habla español, tiene a schulz disposición servicios gratuitos de asistencia lingüística. Mathieu al [phone].    We comply with applicable federal civil rights laws and [place] laws. We do not discriminate on the basis of race, color, national origin, age, disability, sex, sexual orientation, or gender identity.            Thank you!     Thank you for choosing [facility]  for your care. Our goal is always to provide you with excellent care. Hearing back from our patients is one way we can continue to improve our services. Please take a few minutes to complete the written survey that you may receive in the mail after your visit with us. Thank you!             Your Updated Medication List - Protect others around you: Learn how to safely use, store and throw away your medicines at www.disposemymeds.org.          This list is accurate as of 11/13/18 12:38 PM.  Always use your most recent med list.                   Brand Name Dispense Instructions for use Diagnosis    acyclovir 5 % cream    ZOVIRAX    5 g    Apply topically 5 times daily    HSV (herpes simplex virus) infection       escitalopram 10 MG tablet    LEXAPRO    90 tablet    Take 1 tablet (10 mg) by mouth daily    Major depressive disorder, single episode, moderate (H)       ibuprofen 800 MG tablet    ADVIL/MOTRIN    40 tablet    Take 1 tablet (800 mg) by mouth every 8 hours as needed for moderate pain    Post-operative state       levonorgestrel 20 MCG/24HR IUD    MIRENA (52 MG)    1 each    1 each (20 mcg) by Intrauterine route continuous    Family planning       levothyroxine 75 MCG tablet     SYNTHROID/LEVOTHROID    90 tablet    Take 1 tablet (75 mcg) by mouth daily    Goiter       ondansetron 4 MG ODT tab    ZOFRAN ODT    40 tablet    Take 1-2 tablets (4-8 mg) by mouth every 8 hours as needed for nausea or vomiting    Hallux rigidus, right foot       VITAMIN D (CHOLECALCIFEROL) PO      Take 6,000 Units by mouth daily

## 2018-11-13 NOTE — NURSING NOTE
"Chief Complaint   Patient presents with     Surgical Followup     right foot       Initial There were no vitals taken for this visit. Estimated body mass index is 29.35 kg/(m^2) as calculated from the following:    Height as of 11/8/18: 5' 6.75\" (1.695 m).    Weight as of 11/8/18: 186 lb (84.4 kg).  Medication Reconciliation: complete    Erika Thibodeaux LPN  "

## 2018-11-13 NOTE — PROGRESS NOTES
Chief complaint: Patient presents with:  Surgical Followup: right foot      History of Present Illness: This 38 year old female is seen for a post-op appointment following a RIGHT hallux Cheilectomy with Cartiva implant (DOS Monday, 10/29/2018). She presents in a CAM walker boot using one crutch. She has no pain although she sometimes has some pain when walking with the CAM boot without crutches. She is wondering if the sutures are coming out today and if she can get her foot wet in the shower. She has no other pedal complaints at today's appointment.     /62 (BP Location: Left arm, Patient Position: Sitting, Cuff Size: Adult Regular)  Pulse 80  Temp 97.9  F (36.6  C) (Tympanic)  SpO2 98%    Patient Active Problem List   Diagnosis     Family planning     Encounter for school history and physical examination     Fatigue     Mastodynia     Goiter     Chronic tonsillitis     Tonsillar hypertrophy     Papanicolaou smear of cervix with low grade squamous intraepithelial lesion (LGSIL)     High risk HPV infection     RC II (cervical intraepithelial neoplasia II)     NO SHOW     Papanicolaou smear of cervix with atypical squamous cells of undetermined significance (ASC-US)     History of bilateral fallopian tube excision     Multiple joint pain     Throat pain       Past Surgical History:   Procedure Laterality Date     ABDOMINOPLASTY       BUNIONECTOMY Right 10/29/2018    Procedure: RIGHT 1ST METATARSOPHALANGEAL JOINT CHEILECTOMY WITH IMPLANT;  Surgeon: Katty Schroeder DPM;  [facility]     CHEILECTOMY Right 10/29/2018    Procedure: CHEILECTOMY;  Surgeon: Katty Schroeder DPM;  [facility]     CONIZATION LEEP N/A 8/1/2016    Procedure: CONIZATION LEEP;  Surgeon: Chioma Wiggins MD;  [facility]     LAPAROSCOPIC SALPINGECTOMY Bilateral 11/22/2017    Procedure: LAPAROSCOPIC SALPINGECTOMY;  LAPAROSCOPIC BILATERAL SALPINGECTOMY;  Surgeon: Kalin Hernandez MD;  Location: [facility]     MAMMOPLASTY  AUGMENTATION BILATERAL         Current Outpatient Prescriptions   Medication     acyclovir (ZOVIRAX) 5 % cream     escitalopram (LEXAPRO) 10 MG tablet     ibuprofen (ADVIL/MOTRIN) 800 MG tablet     levonorgestrel (MIRENA, 52 MG,) 20 MCG/24HR IUD     levothyroxine (SYNTHROID/LEVOTHROID) 75 MCG tablet     ondansetron (ZOFRAN ODT) 4 MG ODT tab     VITAMIN D, CHOLECALCIFEROL, PO     No current facility-administered medications for this visit.           Allergies   Allergen Reactions     Erythromycin Nausea and Vomiting     Erythromycin base     Penicillins Rash       Family History   Problem Relation Age of Onset     Hypertension Father      Other - See Comments Father      lymphoma     Uterine Cancer Cousin        Social History     Social History     Marital status:      Spouse name: N/A     Number of children: N/A     Years of education: N/A     Occupational History     Rn [employer]     Social History Main Topics     Smoking status: Former Smoker     Quit date: 1/1/2002     Smokeless tobacco: Never Used     Alcohol use Yes      Comment: socially     Drug use: No     Sexual activity: Yes     Partners: Male     Other Topics Concern     Caffeine Concern Yes     coffee 3 cups     Parent/Sibling W/ Cabg, Mi Or Angioplasty Before 65f 55m? No     Social History Narrative       ROS: 10 point ROS neg other than the symptoms noted above in the HPI.  EXAM  Constitutional: healthy, alert and no distress    Psychiatric: mentation appears normal and affect normal/bright    RIGHT FOOT FOCUSED      VASCULAR:  -Dorsalis pedis pulse weakly palpable secondary to mild post-op edema   -Posterior tibial pulse weakly palpable secondary to mild post-op edema  -Hair growth present to dorsal foot  -CAPILLARY FILL TIME < 3 seconds to hallux  -Mild non-pitting edema to RIGHT foot -- par with post-operative course  NEURO:  -Light touch sensation intact to plantar forefoot  DERM:  -Incision with sutures intact over RIGHT foot  1st ray. Incision is intact with skin edges well-approximate without any signs of dehiscence including post removal of sutures.  -Crista-incision has mild erythema, edema, and calor --par with post-operative course.  -Incision has no malodor, no purulence, no moderate color, no moderate or ascending erythema, no moderate edema, no other SOI.   MSK:  -Zero pain with palpation around incision with mild pain upon DORSIFLEXION of the MTPJ  -Patient able to actively dorsiflex and plantarflex her RIGHT hallux  ============================================================     ASSESSMENT:  (M20.21) Hallux rigidus of right foot  (primary encounter diagnosis)       PLAN:  -Patient evaluated and examined. Treatment options discussed with no educational barriers noted.  -X-rays ordered for patient to obtain right before her next f/u appointment  -Sutures removed. Tincture applied around wound followed by steri-strips. Foot dressed with dry gauze and Medpore tape. Wrapped foot and ankle with Kerlix and Coban to limit edema.  -Patient to start wearing compression sock as tolerated.  -Physical therapy referral for 1st MTPJ ROM and other recommended modalities to limit fibrous adhesions within the MTPJ  -Patient may start getting foot wet in the shower in 48 hours (starting Thursday, 11/15/2018).  -Patient to wear her CAM boot one more week then she can slowly start transitioning to stability tennis shoe. She should start passive and active ROM of the 1st MTPJ at home prior to starting physical therapy.  -Patient in agreement with the above treatment plan and all of patient's questions were answered.        RTC two weeks with x-ray prior to appointment to evaluate pain of MTPJ    Katty Schroeder DPM

## 2018-11-20 ENCOUNTER — HOSPITAL ENCOUNTER (OUTPATIENT)
Dept: PHYSICAL THERAPY | Facility: HOSPITAL | Age: 38
Setting detail: THERAPIES SERIES
End: 2018-11-20
Attending: PODIATRIST
Payer: COMMERCIAL

## 2018-11-20 DIAGNOSIS — M20.21 HALLUX RIGIDUS OF RIGHT FOOT: ICD-10-CM

## 2018-11-20 DIAGNOSIS — M79.671 FOOT PAIN, RIGHT: ICD-10-CM

## 2018-11-20 PROCEDURE — 97161 PT EVAL LOW COMPLEX 20 MIN: CPT | Mod: GP

## 2018-11-20 PROCEDURE — 40000718 ZZHC STATISTIC PT DEPARTMENT ORTHO VISIT

## 2018-11-27 ENCOUNTER — HOSPITAL ENCOUNTER (OUTPATIENT)
Dept: PHYSICAL THERAPY | Facility: HOSPITAL | Age: 38
Setting detail: THERAPIES SERIES
End: 2018-11-27
Attending: PODIATRIST
Payer: COMMERCIAL

## 2018-11-27 ENCOUNTER — OFFICE VISIT (OUTPATIENT)
Dept: PODIATRY | Facility: OTHER | Age: 38
End: 2018-11-27
Attending: PODIATRIST
Payer: COMMERCIAL

## 2018-11-27 VITALS
SYSTOLIC BLOOD PRESSURE: 115 MMHG | DIASTOLIC BLOOD PRESSURE: 78 MMHG | BODY MASS INDEX: 28.72 KG/M2 | TEMPERATURE: 98.8 F | WEIGHT: 182 LBS | HEART RATE: 73 BPM | OXYGEN SATURATION: 97 %

## 2018-11-27 DIAGNOSIS — M20.21 HALLUX RIGIDUS OF RIGHT FOOT: Primary | ICD-10-CM

## 2018-11-27 DIAGNOSIS — M20.21 HALLUX RIGIDUS OF RIGHT FOOT: ICD-10-CM

## 2018-11-27 DIAGNOSIS — M79.671 FOOT PAIN, RIGHT: ICD-10-CM

## 2018-11-27 PROCEDURE — 97035 APP MDLTY 1+ULTRASOUND EA 15: CPT | Mod: GP

## 2018-11-27 PROCEDURE — 97140 MANUAL THERAPY 1/> REGIONS: CPT | Mod: GP

## 2018-11-27 PROCEDURE — 40000718 ZZHC STATISTIC PT DEPARTMENT ORTHO VISIT

## 2018-11-27 PROCEDURE — 73630 X-RAY EXAM OF FOOT: CPT | Mod: TC

## 2018-11-27 PROCEDURE — 99024 POSTOP FOLLOW-UP VISIT: CPT | Performed by: PODIATRIST

## 2018-11-27 ASSESSMENT — PAIN SCALES - GENERAL: PAINLEVEL: NO PAIN (0)

## 2018-11-27 NOTE — LETTER
December 4, 2018      Sheridan Roldan  [address] [place] [id number]        To Whom It May Concern:    Sheridan Roldan was seen in our podiatry clinic on 11/27/2018. She may return to work on 12/12/2018 without shoe gear restrictions and without walking restrictions, but she may be restricted to working less hours per shift for the first few weeks. She will be evaluated again by podiatry on Tuesday, 12/11/2018 and it will be determined at that time if she will need to start working with total hour restrictions based on her progress with physical therapy.      Sincerely,        Katty Schroeder DPM

## 2018-11-27 NOTE — PROGRESS NOTES
Chief complaint: Patient presents with:  Surgical Followup: right foot bunion        History of Present Illness: This 38 year old female is seen for a post-op appointment following a RIGHT hallux Cheilectomy with Cartiva implant (DOS Monday, 10/29/2018). She presents in a winter boot and says she has not been wearing her CAM boot. She has minimal pain while walking, but her toe is too swollen and stiff to fit into a regular tennis shoe. She did not know what kind of stability shoe to get and she says she only has lightweight shoes at home.   Patient also started physical therapy yesterday. She was initially evaluated a week ago by PT, but her incision looked like it was dehiscing so they had her wait until this week. She has another appointment with them tomorrow. She has not tried wearing compression socks yet. She says she is more prone to hypertrophic scars. She has no other pedal complaints at today's appointment.     /78 (BP Location: Left arm, Patient Position: Sitting, Cuff Size: Adult Regular)  Pulse 73  Temp 98.8  F (37.1  C) (Tympanic)  Wt 182 lb (82.6 kg)  SpO2 97%  BMI 28.72 kg/m2    Patient Active Problem List   Diagnosis     Family planning     Encounter for school history and physical examination     Fatigue     Mastodynia     Goiter     Chronic tonsillitis     Tonsillar hypertrophy     Papanicolaou smear of cervix with low grade squamous intraepithelial lesion (LGSIL)     High risk HPV infection     RC II (cervical intraepithelial neoplasia II)     NO SHOW     Papanicolaou smear of cervix with atypical squamous cells of undetermined significance (ASC-US)     History of bilateral fallopian tube excision     Multiple joint pain     Throat pain       Past Surgical History:   Procedure Laterality Date     ABDOMINOPLASTY       BUNIONECTOMY Right 10/29/2018    Procedure: RIGHT 1ST METATARSOPHALANGEAL JOINT CHEILECTOMY WITH IMPLANT;  Surgeon: Katty Schroeder DPM;  [facility]      CHEILECTOMY Right 10/29/2018    Procedure: CHEILECTOMY;  Surgeon: Katty Schroeder DPM;  [facility]     CONIZATION LEEP N/A 8/1/2016    Procedure: CONIZATION LEEP;  Surgeon: Chioma Wiggins MD;  [facility]     LAPAROSCOPIC SALPINGECTOMY Bilateral 11/22/2017    Procedure: LAPAROSCOPIC SALPINGECTOMY;  LAPAROSCOPIC BILATERAL SALPINGECTOMY;  Surgeon: Kalin Hernandez MD;  [facility]     MAMMOPLASTY AUGMENTATION BILATERAL         Current Outpatient Prescriptions   Medication     acyclovir (ZOVIRAX) 5 % cream     escitalopram (LEXAPRO) 10 MG tablet     ibuprofen (ADVIL/MOTRIN) 800 MG tablet     levonorgestrel (MIRENA, 52 MG,) 20 MCG/24HR IUD     levothyroxine (SYNTHROID/LEVOTHROID) 75 MCG tablet     ondansetron (ZOFRAN ODT) 4 MG ODT tab     VITAMIN D, CHOLECALCIFEROL, PO     No current facility-administered medications for this visit.           Allergies   Allergen Reactions     Erythromycin Nausea and Vomiting     Erythromycin base     Penicillins Rash       Family History   Problem Relation Age of Onset     Hypertension Father      Other - See Comments Father      lymphoma     Uterine Cancer Cousin        Social History     Social History     Marital status:      Spouse name: N/A     Number of children: N/A     Years of education: N/A     Occupational History     Rn [employer]     Social History Main Topics     Smoking status: Former Smoker     Quit date: 1/1/2002     Smokeless tobacco: Never Used     Alcohol use Yes      Comment: socially     Drug use: No     Sexual activity: Yes     Partners: Male     Other Topics Concern     Caffeine Concern Yes     coffee 3 cups     Parent/Sibling W/ Cabg, Mi Or Angioplasty Before 65f 55m? No     Social History Narrative       ROS: 10 point ROS neg other than the symptoms noted above in the HPI.  EXAM  Constitutional: healthy, alert and no distress     Psychiatric: mentation appears normal and affect normal/bright     RIGHT FOOT  FOCUSED      VASCULAR:  -Dorsalis pedis pulse weakly palpable secondary to mild post-op edema   -Posterior tibial pulse weakly palpable secondary to mild post-op edema  -Hair growth present to dorsal foot  -CAPILLARY FILL TIME < 3 seconds to hallux  -Mild non-pitting edema to RIGHT foot -- par with post-operative course  NEURO:  -Light touch sensation intact to plantar forefoot  DERM:  -Incision site intact over RIGHT foot 1st ray. Skin edges well-approximate without any signs of dehiscence. Central incision has a well-adhered eschar along the incision approximately 2cm in length. No open wounds.  -Crista-incision has mild erythema, edema, and minimal hypertrophic scaring--par with post-operative course.  -Incision has no malodor, no purulence, no moderate color, no moderate or ascending erythema, no moderate edema, no other SOI.   MSK:  -Zero pain with palpation around incision with mild pain upon DORSIFLEXION of the MTPJ  -Patient able to actively dorsiflex and plantarflex her RIGHT hallux  ============================================================      ASSESSMENT:  (M20.21) Hallux rigidus of right foot  (primary encounter diagnosis)        PLAN:  -Patient evaluated and examined. Treatment options discussed with no educational barriers noted.  -Radiographs --patient to obtain today after appt  -Advised patient to consider ordering and using [employer] care to help decrease the minimal hypertrophic scaring of her incision  -Patient to start wearing a compression sock as tolerated, elevate her foot and ice at home. This will help get her foot to fit in a shoe. She may try her lightweight tennis shoes at home, but if it is uncomfortable, recommend she try a more rigid, stability shoe.  -Patient requesting form to be filled out and faxed regarding her post-op care. Will fax today.  -Patient in agreement with the above treatment plan and all of patient's questions were answered.          RTC two weeks with x-ray after  appointment to release patient to work      Katty Schroeder DPM

## 2018-11-27 NOTE — MR AVS SNAPSHOT
After Visit Summary   11/27/2018    Sheridan Roldan    MRN: [id number]           Patient Information     Date Of Birth          1980        Visit Information        Provider Department      11/27/2018 11:30 AM Katty Schroeder DPM [facility]        Today's Diagnoses     Hallux rigidus of right foot    -  1       Follow-ups after your visit        Your next 10 appointments already scheduled     Nov 29, 2018 11:00 AM CST   Ortho Treatment with Deyanira Tornow, PTA   [facility] ([facility] )    [address] [address]   [phone]            Dec 03, 2018 11:30 AM CST   Ortho Treatment with Deyanira Tornow, PTA   [facility] ([facility] )    [address] [address]   [phone]            Dec 05, 2018 11:00 AM CST   Ortho Treatment with Deyanira Tornow, PTA   [facility] ([facility] )    [address] [address]   [phone]            Dec 11, 2018 10:30 AM CST   (Arrive by 10:15 AM)   [facility] Op with Katty Schroeder DPM   [facility]    [address] [place] [id number] [phone]            Dec 11, 2018 11:00 AM CST   Ortho Treatment with Rochelle Hernadez, PT   [facility] ([facility] )    [address] [address]   [phone]            Dec 13, 2018  4:00 PM CST   Ortho Treatment with Rochelle Hernadez, PT   [facility] ([facility] )    [address] [place] [address]   [phone]            José Luis 10, 2019 10:00 AM CDT   (Arrive by 9:45 AM)   PHYSICAL with REYNA Ayala CNM   [facility]    [address] [place] [address]   [phone]              Who to contact     If you have questions or need follow up information about today's clinic visit or your  schedule please contact [facility] directly at [phone].  Normal or non-critical lab and imaging results will be communicated to you by MyChart, letter or phone within 4 business days after the clinic has received the results. If you do not hear from us within 7 days, please contact the clinic through MyChart or phone. If you have a critical or abnormal lab result, we will notify you by phone as soon as possible.  Submit refill requests through [facility] or call your pharmacy and they will forward the refill request to us. Please allow 3 business days for your refill to be completed.          Additional Information About Your Visit        [employer] Information     [facility] gives you secure access to your electronic health record. If you see a primary care provider, you can also send messages to your care team and make appointments. If you have questions, please call your primary care clinic.  If you do not have a primary care provider, please call [phone] and they will assist you.        Care EveryWhere ID     This is your Care EveryWhere ID. This could be used by other organizations to access your [facility] medical records  [id number]        Your Vitals Were     Pulse Temperature Pulse Oximetry BMI (Body Mass Index)          73 98.8  F (37.1  C) (Tympanic) 97% 28.72 kg/m2         Blood Pressure from Last 3 Encounters:   11/27/18 115/78   11/13/18 110/62   11/08/18 108/64    Weight from Last 3 Encounters:   11/27/18 182 lb (82.6 kg)   11/08/18 186 lb (84.4 kg)   10/29/18 186 lb 3.2 oz (84.5 kg)              Today, you had the following     No orders found for display       Primary Care Provider Office Phone # Fax #    SARAH Bond [phone]       [address] [place] [address]        Equal Access to Services     AMBROSIO ROBERTSON : Jimenez francoo Sonancy, waaxda luqadaha, qaybta kaalmada jenniferyada, lizz gay. So [facility]  [phone].    ATENCIÓN: Si danitza diaz, tiene a schulz disposición servicios gratuitos de asistencia lingüística. Mathieu orr [phone].    We comply with applicable federal civil rights laws and [place] laws. We do not discriminate on the basis of race, color, national origin, age, disability, sex, sexual orientation, or gender identity.            Thank you!     Thank you for choosing [facility]  for your care. Our goal is always to provide you with excellent care. Hearing back from our patients is one way we can continue to improve our services. Please take a few minutes to complete the written survey that you may receive in the mail after your visit with us. Thank you!             Your Updated Medication List - Protect others around you: Learn how to safely use, store and throw away your medicines at www.disposemymeds.org.          This list is accurate as of 11/27/18 12:55 PM.  Always use your most recent med list.                   Brand Name Dispense Instructions for use Diagnosis    acyclovir 5 % cream    ZOVIRAX    5 g    Apply topically 5 times daily    HSV (herpes simplex virus) infection       escitalopram 10 MG tablet    LEXAPRO    90 tablet    Take 1 tablet (10 mg) by mouth daily    Major depressive disorder, single episode, moderate (H)       ibuprofen 800 MG tablet    ADVIL/MOTRIN    40 tablet    Take 1 tablet (800 mg) by mouth every 8 hours as needed for moderate pain    Post-operative state       levonorgestrel 20 MCG/24HR IUD    MIRENA (52 MG)    1 each    1 each (20 mcg) by Intrauterine route continuous    Family planning       levothyroxine 75 MCG tablet    SYNTHROID/LEVOTHROID    90 tablet    Take 1 tablet (75 mcg) by mouth daily    Goiter       ondansetron 4 MG ODT tab    ZOFRAN ODT    40 tablet    Take 1-2 tablets (4-8 mg) by mouth every 8 hours as needed for nausea or vomiting    Hallux rigidus, right foot       VITAMIN D (CHOLECALCIFEROL) PO      Take 6,000 Units  by mouth daily

## 2018-11-27 NOTE — NURSING NOTE
"Chief Complaint   Patient presents with     Surgical Followup     right foot bunion       Initial There were no vitals taken for this visit. Estimated body mass index is 29.35 kg/(m^2) as calculated from the following:    Height as of 11/8/18: 5' 6.75\" (1.695 m).    Weight as of 11/8/18: 186 lb (84.4 kg).  Medication Reconciliation: complete    Erika Thibodeaux LPN  "

## 2018-11-29 ENCOUNTER — HOSPITAL ENCOUNTER (OUTPATIENT)
Dept: PHYSICAL THERAPY | Facility: HOSPITAL | Age: 38
Setting detail: THERAPIES SERIES
End: 2018-11-29
Attending: PODIATRIST
Payer: COMMERCIAL

## 2018-11-29 PROCEDURE — 97035 APP MDLTY 1+ULTRASOUND EA 15: CPT | Mod: GP

## 2018-11-29 PROCEDURE — 40000718 ZZHC STATISTIC PT DEPARTMENT ORTHO VISIT

## 2018-11-29 PROCEDURE — 97110 THERAPEUTIC EXERCISES: CPT | Mod: GP

## 2018-12-03 ENCOUNTER — HOSPITAL ENCOUNTER (OUTPATIENT)
Dept: PHYSICAL THERAPY | Facility: HOSPITAL | Age: 38
Setting detail: THERAPIES SERIES
End: 2018-12-03
Attending: PODIATRIST
Payer: COMMERCIAL

## 2018-12-03 PROCEDURE — 40000718 ZZHC STATISTIC PT DEPARTMENT ORTHO VISIT

## 2018-12-03 PROCEDURE — 97110 THERAPEUTIC EXERCISES: CPT | Mod: GP

## 2018-12-03 PROCEDURE — 97035 APP MDLTY 1+ULTRASOUND EA 15: CPT | Mod: GP

## 2018-12-05 ENCOUNTER — HOSPITAL ENCOUNTER (OUTPATIENT)
Dept: PHYSICAL THERAPY | Facility: HOSPITAL | Age: 38
Setting detail: THERAPIES SERIES
End: 2018-12-05
Attending: PODIATRIST
Payer: COMMERCIAL

## 2018-12-05 PROCEDURE — 97140 MANUAL THERAPY 1/> REGIONS: CPT | Mod: GP

## 2018-12-05 PROCEDURE — 40000718 ZZHC STATISTIC PT DEPARTMENT ORTHO VISIT

## 2018-12-06 ENCOUNTER — DOCUMENTATION ONLY (OUTPATIENT)
Dept: PODIATRY | Facility: OTHER | Age: 38
End: 2018-12-06

## 2018-12-06 NOTE — PROGRESS NOTES
Received request to provide information to [facility] Medical Request Team for short term disability. With an authorization to release information by Sheridan Roldan. Faxed 41 pages of progress notes, PT notes and X-ray to [phone] per their request.

## 2018-12-11 ENCOUNTER — OFFICE VISIT (OUTPATIENT)
Dept: PODIATRY | Facility: OTHER | Age: 38
End: 2018-12-11
Attending: PODIATRIST
Payer: COMMERCIAL

## 2018-12-11 ENCOUNTER — TELEPHONE (OUTPATIENT)
Dept: PODIATRY | Facility: OTHER | Age: 38
End: 2018-12-11

## 2018-12-11 VITALS
TEMPERATURE: 98.1 F | SYSTOLIC BLOOD PRESSURE: 112 MMHG | WEIGHT: 182 LBS | HEART RATE: 68 BPM | OXYGEN SATURATION: 98 % | DIASTOLIC BLOOD PRESSURE: 74 MMHG | BODY MASS INDEX: 28.72 KG/M2

## 2018-12-11 DIAGNOSIS — M20.21 HALLUX RIGIDUS OF RIGHT FOOT: Primary | ICD-10-CM

## 2018-12-11 DIAGNOSIS — G89.18 POST-OP PAIN: ICD-10-CM

## 2018-12-11 PROCEDURE — 99024 POSTOP FOLLOW-UP VISIT: CPT | Performed by: PODIATRIST

## 2018-12-11 ASSESSMENT — PAIN SCALES - GENERAL: PAINLEVEL: MILD PAIN (2)

## 2018-12-11 NOTE — PROGRESS NOTES
Chief complaint: Patient presents with:  Post-op Visit: right foot      History of Present Illness: This 38 year old female is seen for a post-op appointment following a RIGHT hallux Cheilectomy with Cartiva implant (DOS Monday, 10/29/2018). She presents in a lightweight Under [place] shoe on both feet. She says she still has stiffness and swelling in the joint and numbness along the incision line. She is wondering how long this will last and if this is normal. She has not used [facility] [employer] on her incision because she thought her incision line has improved. She is still seeing physical therapy which makes her foot sore by the end of the session, but it seems to be helping. She wonders if she should continue PT while working.     In addition, patient is looking to go back to work full-time on Monday, 11/17/2018. She wants to make sure she will not cause damage to the surgical site if she goes back to work with regular, unrestricted hours. 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 She has no other pedal complaints at today's appointment.     /74 (BP Location: Right arm, Patient Position: Sitting, Cuff Size: Adult Regular)   Pulse 68   Temp 98.1  F (36.7  C) (Tympanic)   Wt 82.6 kg (182 lb)   SpO2 98%   BMI 28.72 kg/m      Patient Active Problem List   Diagnosis     Family planning     Encounter for school history and physical examination     Fatigue     Mastodynia     Goiter     Chronic tonsillitis     Tonsillar hypertrophy     Papanicolaou smear of cervix with low grade squamous intraepithelial lesion (LGSIL)     High risk HPV infection     RC II (cervical intraepithelial neoplasia II)     NO SHOW     Papanicolaou smear of cervix with atypical squamous cells of undetermined significance (ASC-US)     History of bilateral fallopian tube excision     Multiple joint pain     Throat pain       Past Surgical History:   Procedure Laterality Date     ABDOMINOPLASTY        BUNIONECTOMY Right 10/29/2018    Procedure: RIGHT 1ST METATARSOPHALANGEAL JOINT CHEILECTOMY WITH IMPLANT;  Surgeon: Katty Schroeder DPM;  [facility]     CHEILECTOMY Right 10/29/2018    Procedure: CHEILECTOMY;  Surgeon: Katty Schroeder DPM;  [facility]     CONIZATION LEEP N/A 2016    Procedure: CONIZATION LEEP;  Surgeon: Chioma Wiggins MD;  [facility]     LAPAROSCOPIC SALPINGECTOMY Bilateral 2017    Procedure: LAPAROSCOPIC SALPINGECTOMY;  LAPAROSCOPIC BILATERAL SALPINGECTOMY;  Surgeon: Kalin Hernandez MD;  [facility]     MAMMOPLASTY AUGMENTATION BILATERAL         Current Outpatient Medications   Medication     acyclovir (ZOVIRAX) 5 % cream     escitalopram (LEXAPRO) 10 MG tablet     ibuprofen (ADVIL/MOTRIN) 800 MG tablet     levonorgestrel (MIRENA, 52 MG,) 20 MCG/24HR IUD     levothyroxine (SYNTHROID/LEVOTHROID) 75 MCG tablet     ondansetron (ZOFRAN ODT) 4 MG ODT tab     VITAMIN D, CHOLECALCIFEROL, PO     No current facility-administered medications for this visit.           Allergies   Allergen Reactions     Erythromycin Nausea and Vomiting     Erythromycin base     Penicillins Rash       Family History   Problem Relation Age of Onset     Hypertension Father      Other - See Comments Father         lymphoma     Uterine Cancer Cousin        Social History     Socioeconomic History     Marital status:      Spouse name: Not on file     Number of children: Not on file     Years of education: Not on file     Highest education level: Not on file   Social Needs     Financial resource strain: Not on file     Food insecurity - worry: Not on file     Food insecurity - inability: Not on file     Transportation needs - medical: Not on file     Transportation needs - non-medical: Not on file   Occupational History     Occupation: Rn     Employer: [employer] [place]   Tobacco Use     Smoking status: Former Smoker     Last attempt to quit: 2002     Years since quittin.9      Smokeless tobacco: Never Used   Substance and Sexual Activity     Alcohol use: Yes     Comment: socially     Drug use: No     Sexual activity: Yes     Partners: Male   Other Topics Concern      Service Not Asked     Blood Transfusions Not Asked     Caffeine Concern Yes     Comment: coffee 3 cups     Occupational Exposure Not Asked     Hobby Hazards Not Asked     Sleep Concern Not Asked     Stress Concern Not Asked     Weight Concern Not Asked     Special Diet Not Asked     Back Care Not Asked     Exercise Not Asked     Bike Helmet Not Asked     Seat Belt Not Asked     Self-Exams Not Asked     Parent/sibling w/ CABG, MI or angioplasty before 65F 55M? No   Social History Narrative     Not on file       ROS: 10 point ROS neg other than the symptoms noted above in the HPI.  EXAM  Constitutional: healthy, alert and no distress     Psychiatric: mentation appears normal and affect normal/bright     RIGHT FOOT FOCUSED      VASCULAR:  -Dorsalis pedis pulse +2/4   -Posterior tibial pulse +2/4  -Hair growth present to dorsal foot  -CAPILLARY FILL TIME < 3 seconds to hallux  -Mild non-pitting edema to RIGHT foot -- par with post-operative course    NEURO:  -Light touch sensation intact to plantar forefoot    DERM:  -Incision site intact over RIGHT foot 1st ray. Skin edges well-approximate without any signs of dehiscence. Central incision has a well-adhered eschar along the incision approximately 1.5cm in length. No open wounds.  -Crista-incision has no erythema, mild edema, and minimal hypertrophic scaring but less from previous exam--par with post-operative course.  -Incision has no malodor, no purulence, no moderate color, no moderate or ascending erythema, no moderate edema, no other SOI.     MSK:  -ROM of RIGHT 1st MTPJ reaches 45 to 50 degrees DORSIFLEXION upon loading the forefoot. No crepitus with smooth ROM  -Zero pain with palpation around incision with mild pain upon DORSIFLEXION of the MTPJ  -Patient able  to actively dorsiflex and plantarflex her RIGHT hallux  ============================================================      ASSESSMENT:  (M20.21) Hallux rigidus of right foot  (primary encounter diagnosis)    (G89.18) Post-op pain        PLAN:  -Patient evaluated and examined. Treatment options discussed with no educational barriers noted.  -Radiographs reviewed with patient from prior appointment  -Radiographs ordered for today  -Continue physical therapy  -Return to work without restrictions--patient to continue physical therapy while working. She is also instructed to elevate and ice her foot as often as possible for pain relief.   -Continue compression socks daily  -Shoe Gear: Discussed proper shoe gear with patient. This involves wearing a stability shoe that bends at the toe, but has a solid midfoot shank and heel contour. If she cannot tolerate a rigid shoe, she may try a lightweight shoe.  -CMO ordered with reverse maldonado's extension of RIGHT 1st metatarsal head  -Work note written for patient to return to work on Monday, 12/17/2018 without restrictions. Letter faxed.  -Patient in agreement with the above treatment plan and all of patient's questions were answered.          RTC one month to evaluate RIGHT 1st MTPJ joint pain and stiffness after starting work      Katty Schroeder DPM

## 2018-12-11 NOTE — NURSING NOTE
"Chief Complaint   Patient presents with     Post-op Visit     right foot       Initial There were no vitals taken for this visit. Estimated body mass index is 28.72 kg/m  as calculated from the following:    Height as of 11/8/18: 1.695 m (5' 6.75\").    Weight as of 11/27/18: 82.6 kg (182 lb).  Medication Reconciliation: complete    Erika Thibodeaux LPN  "

## 2018-12-11 NOTE — LETTER
December 11, 2018      Sheridan Roldan  [address] [place] [id number]        To Whom It May Concern:    Sheridan Roldan was seen in our clinic on 12/11/2018. She may return to work without restrictions starting Monday, 12/17/2018. Patient is continuing physical therapy at least two days a week at the end of the day while she is working. She will be seen by our clinic again in one month to evaluate her foot after she has started work.      Sincerely,        Katty Schroeder DPM

## 2018-12-27 ENCOUNTER — APPOINTMENT (OUTPATIENT)
Dept: LAB | Facility: OTHER | Age: 38
End: 2018-12-27
Attending: NURSE PRACTITIONER
Payer: COMMERCIAL

## 2018-12-27 ENCOUNTER — TELEPHONE (OUTPATIENT)
Dept: CARDIOLOGY | Facility: OTHER | Age: 38
End: 2018-12-27
Payer: COMMERCIAL

## 2018-12-27 DIAGNOSIS — R68.89 FLU-LIKE SYMPTOMS: Primary | ICD-10-CM

## 2018-12-27 LAB
FLUAV+FLUBV RNA SPEC QL NAA+PROBE: NEGATIVE
FLUAV+FLUBV RNA SPEC QL NAA+PROBE: NEGATIVE
RSV RNA SPEC NAA+PROBE: NEGATIVE
SPECIMEN SOURCE: NORMAL

## 2018-12-27 PROCEDURE — 87631 RESP VIRUS 3-5 TARGETS: CPT | Performed by: NURSE PRACTITIONER

## 2018-12-27 NOTE — TELEPHONE ENCOUNTER
Requesting flu testing due to fever of 100.0 and flu like symptoms.  Ok'd verbally by PAUL Rodríguez.

## 2018-12-27 NOTE — LETTER
[facility] [place]  [address]  [place] [address]  [phone]          December 27, 2018    RE:  Sheridan Roldan                                                                                                                                                       [address] [facility] [place] [address]            To whom it may concern:        Sheridan Roldan is under my professional care is unable to work today due to illness.           Sincerely,        Trinidad GRIMMNP

## 2019-01-15 ENCOUNTER — ANCILLARY PROCEDURE (OUTPATIENT)
Dept: GENERAL RADIOLOGY | Facility: OTHER | Age: 39
End: 2019-01-15
Attending: PODIATRIST
Payer: COMMERCIAL

## 2019-01-15 ENCOUNTER — OFFICE VISIT (OUTPATIENT)
Dept: PODIATRY | Facility: OTHER | Age: 39
End: 2019-01-15
Attending: PODIATRIST
Payer: COMMERCIAL

## 2019-01-15 VITALS
DIASTOLIC BLOOD PRESSURE: 79 MMHG | HEART RATE: 75 BPM | TEMPERATURE: 98.2 F | SYSTOLIC BLOOD PRESSURE: 118 MMHG | OXYGEN SATURATION: 98 %

## 2019-01-15 DIAGNOSIS — M20.21 HALLUX RIGIDUS OF RIGHT FOOT: ICD-10-CM

## 2019-01-15 DIAGNOSIS — M20.21 HALLUX RIGIDUS OF RIGHT FOOT: Primary | ICD-10-CM

## 2019-01-15 PROCEDURE — 99024 POSTOP FOLLOW-UP VISIT: CPT | Performed by: PODIATRIST

## 2019-01-15 PROCEDURE — 73630 X-RAY EXAM OF FOOT: CPT | Mod: TC

## 2019-01-15 ASSESSMENT — PAIN SCALES - GENERAL: PAINLEVEL: NO PAIN (0)

## 2019-01-15 NOTE — NURSING NOTE
"Chief Complaint   Patient presents with     Post-op Visit     right foot       Initial There were no vitals taken for this visit. Estimated body mass index is 28.72 kg/m  as calculated from the following:    Height as of 11/8/18: 1.695 m (5' 6.75\").    Weight as of 12/11/18: 82.6 kg (182 lb).  Medication Reconciliation: complete    Erika Thibodeaux LPN  "

## 2019-01-15 NOTE — PROGRESS NOTES
Chief complaint: Patient presents with:  Post-op Visit: right foot      History of Present Illness: This 38 year old female is seen for a post-op appointment following a RIGHT hallux Cheilectomy with Cartiva implant (DOS Monday, 10/29/2018). She presents in a lightweight Under [place] shoe on both feet. She says she still has stiffness and swelling in the joint. Wearing compression socks reduces her pain by the end of her work shift. 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 She returned to work in mid-to-late November, 2018. Since she started working, she stopped going to physical therapy because it is difficult to find the time to go to her appointments. She is also having difficulty finding dress shoes that fit well because her RIGHT foot feels too compressed and swollen in the dress shoes and she says she already has a wide foot. She is wondering if this will be able to fit better into these types of shoes in the future. No further pedal complaints today.      /79 (BP Location: Right arm, Patient Position: Sitting, Cuff Size: Adult Regular)   Pulse 75   Temp 98.2  F (36.8  C) (Tympanic)   SpO2 98%     Patient Active Problem List   Diagnosis     Family planning     Encounter for school history and physical examination     Fatigue     Mastodynia     Goiter     Chronic tonsillitis     Tonsillar hypertrophy     Papanicolaou smear of cervix with low grade squamous intraepithelial lesion (LGSIL)     High risk HPV infection     RC II (cervical intraepithelial neoplasia II)     NO SHOW     Papanicolaou smear of cervix with atypical squamous cells of undetermined significance (ASC-US)     History of bilateral fallopian tube excision     Multiple joint pain     Throat pain       Past Surgical History:   Procedure Laterality Date     ABDOMINOPLASTY       BUNIONECTOMY Right 10/29/2018    Procedure: RIGHT 1ST METATARSOPHALANGEAL JOINT CHEILECTOMY WITH IMPLANT;  Surgeon: Katty Schroeder DPM;  [facility]      CHEILECTOMY Right 10/29/2018    Procedure: CHEILECTOMY;  Surgeon: Katty Schroeder DPM;  [facility]     CONIZATION LEEP N/A 2016    Procedure: CONIZATION LEEP;  Surgeon: Chioma Wiggins MD;  [facility]     LAPAROSCOPIC SALPINGECTOMY Bilateral 2017    Procedure: LAPAROSCOPIC SALPINGECTOMY;  LAPAROSCOPIC BILATERAL SALPINGECTOMY;  Surgeon: Kalin Hernandez MD;  [facility]     MAMMOPLASTY AUGMENTATION BILATERAL         Current Outpatient Medications   Medication     acyclovir (ZOVIRAX) 5 % cream     escitalopram (LEXAPRO) 10 MG tablet     ibuprofen (ADVIL/MOTRIN) 800 MG tablet     levonorgestrel (MIRENA, 52 MG,) 20 MCG/24HR IUD     levothyroxine (SYNTHROID/LEVOTHROID) 75 MCG tablet     ondansetron (ZOFRAN ODT) 4 MG ODT tab     VITAMIN D, CHOLECALCIFEROL, PO     No current facility-administered medications for this visit.           Allergies   Allergen Reactions     Erythromycin Nausea and Vomiting     Erythromycin base     Penicillins Rash       Family History   Problem Relation Age of Onset     Hypertension Father      Other - See Comments Father         lymphoma     Uterine Cancer Cousin        Social History     Socioeconomic History     Marital status:      Spouse name: None     Number of children: None     Years of education: None     Highest education level: None   Social Needs     Financial resource strain: None     Food insecurity - worry: None     Food insecurity - inability: None     Transportation needs - medical: None     Transportation needs - non-medical: None   Occupational History     Occupation: Rn     Employer: [employer] [place]   Tobacco Use     Smoking status: Former Smoker     Last attempt to quit: 2002     Years since quittin.0     Smokeless tobacco: Never Used   Substance and Sexual Activity     Alcohol use: Yes     Comment: socially     Drug use: No     Sexual activity: Yes     Partners: Male   Other Topics Concern      Service Not  Asked     Blood Transfusions Not Asked     Caffeine Concern Yes     Comment: coffee 3 cups     Occupational Exposure Not Asked     Hobby Hazards Not Asked     Sleep Concern Not Asked     Stress Concern Not Asked     Weight Concern Not Asked     Special Diet Not Asked     Back Care Not Asked     Exercise Not Asked     Bike Helmet Not Asked     Seat Belt Not Asked     Self-Exams Not Asked     Parent/sibling w/ CABG, MI or angioplasty before 65F 55M? No   Social History Narrative     None       ROS: 10 point ROS neg other than the symptoms noted above in the HPI.  EXAM  Constitutional: healthy, alert and no distress     Psychiatric: mentation appears normal and affect normal/bright     RIGHT FOOT FOCUSED      VASCULAR:  -Dorsalis pedis pulse +2/4   -Posterior tibial pulse +2/4  -Hair growth present to dorsal foot  -CAPILLARY FILL TIME < 3 seconds to hallux  -Minimal non-pitting edema to RIGHT foot compared to LEFT -- par with post-operative course     NEURO:  -Light touch sensation intact to plantar forefoot     DERM:  -Cicatrix intact over RIGHT foot 1st ray. Skin edges well-approximate without any signs of dehiscence.  -Crista-incision has no erythema, minimal edema, and minimal hypertrophic scaring but less from previous exam--par with post-operative course.  -Incision has no malodor, no purulence, no moderate color, no moderate or ascending erythema, no moderate edema, no other SOI.      MSK:  -ROM of RIGHT 1st MTPJ reaches 45 to 50 degrees DORSIFLEXION upon loading the forefoot. No crepitus with smooth ROM  -Zero pain with palpation around incision with mild pain upon DORSIFLEXION of the MTPJ  -Patient able to actively dorsiflex and plantarflex her RIGHT hallux    RADIOGRAPHS RIGHT FOOT 01/15/2019  FINDINGS: There is a focal defect in the first metatarsal head. This  is relatively stable dating back to 11/2/2018. This is most consistent  with stable post operative change. There has also been resection of  the medial  first metatarsal head.     There is some increased narrowing of the first metatarsal phalangeal  joint. There are very small calcaneal spurs.                                                                        IMPRESSION: Changes in the first metatarsal head which are stable and  likely postsurgical. Increase degenerative change in first metatarsal  phalangeal joint with narrowing of the joint but no significant spur  formation.     BEATRICE THOMAS MD  ============================================================      ASSESSMENT:  (M20.21) Hallux rigidus of right foot  (primary encounter diagnosis)       PLAN:  -Patient evaluated and examined. Treatment options discussed with no educational barriers noted.  -Radiographs were ordered at last appointment--patient to go to clinic after today's appointment for a last post-op x-ray. She will not need further radiographs after today's appointment unless she has complications or pain around her surgical site.  -Advised patient to resume physical therapy for a few more sessions for ROM and iontophoresis which can further break up scar tissue along the surgical site. She can continue to have improvement in pain and ROM for up to six months after surgery. Edema at the end of a work day is normal and will likely continue even up to a year after surgery. She is still within three months of her surgery date, so pain is also normal, but pain should continue to progress over the next few months. She should return if she does not notice improvement in pain.  -Continue ROM at home.   -Ice and elevate foot after each work shift.  -Continue compression socks daily  -Patient in agreement with the above treatment plan and all of patient's questions were answered.          RTC as needed       Katty Schroeder DPM

## 2019-02-28 ENCOUNTER — HOSPITAL ENCOUNTER (OUTPATIENT)
Dept: PHYSICAL THERAPY | Facility: HOSPITAL | Age: 39
Setting detail: THERAPIES SERIES
End: 2019-02-28
Attending: PODIATRIST
Payer: COMMERCIAL

## 2019-02-28 DIAGNOSIS — M20.21 HALLUX RIGIDUS OF RIGHT FOOT: ICD-10-CM

## 2019-02-28 PROCEDURE — 97110 THERAPEUTIC EXERCISES: CPT | Mod: GP

## 2019-02-28 PROCEDURE — 97140 MANUAL THERAPY 1/> REGIONS: CPT | Mod: GP

## 2019-02-28 PROCEDURE — 97161 PT EVAL LOW COMPLEX 20 MIN: CPT | Mod: GP

## 2019-02-28 PROCEDURE — 97530 THERAPEUTIC ACTIVITIES: CPT | Mod: GP

## 2019-02-28 NOTE — PROGRESS NOTES
Initial Physical Therapy Evaluation      Name: Sheridan Roldan MRN# [id number]   Age: 38 year old YOB: 1980     Date of Consultation: February 28, 2019  Primary care provider: Danielle Waller    Referring Physician: Dr. Schroeder  Orders: Eval and Treat  Medical Diagnosis: Hallux rigidus of right foot [M20.21]   Physician Comments: Patient still has stiff ROM of the 1st MTPJ post op after a joint clean up procedure. Please work with her more on ROM on the 1st MTPJ, iontophoresis to the joint, and strengthening exercises fr intrinsic muscles of the foot. Thank you.  Onset of Illness/Injury: 10/29/19 for RIGHT 1ST METATARSOPHALANGEAL JOINT CHEILECTOMY WITH IMPLANT  CHEILECTOMY. Overall onset was last 3-4 years. Low back pain for over 5 years.   [employer]    Reason for PT Visit: Patient is a 37 y/o female who presents with R great toe pain that is almost always present. Low level pain usually of 3/10. Is present at rest and during ambulation. Most bothersome with prolonged walking and prolonged standing. Underwent surgical procedure for symptoms and has not noticed improvement in overall symptoms. Pain does not limit sleep. Is present with daily mobility and work tasks. 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     In discussion of other factors, patient brings up that she has back pain intermittently present as well. Denies sciatica. Pain that is present does interfere with work and is present with prolonged sitting and prolonged standing. Stiff in mornings.     Has had MRI of lumbar spine.   Prior Level of Function: Baseline pain of 3/10 for past three years in R great toe.    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 No  Have you fallen and had  an injury in the last year? No  Timed up & go: NT   Is patient a fall risk? No    Past Medical History:   Past Medical History:   Diagnosis Date     Anxiety state, unspecified 3/12/2007     Breech presentation without mention of version, antepartum 1/10/2006     Galactorrhea not associated with childbirth 05/10/2005     Leukorrhea, not specified as infective 2005     Post term pregnancy, antepartum condition or complication 2006     Spotting complicating pregnancy, antepartum condition or complication 2008     Supervision of other normal pregnancy 2005     Threatened , antepartum 2007       Past Surgical History:  Past Surgical History:   Procedure Laterality Date     ABDOMINOPLASTY       BUNIONECTOMY Right 10/29/2018    Procedure: RIGHT 1ST METATARSOPHALANGEAL JOINT CHEILECTOMY WITH IMPLANT;  Surgeon: Katty Schroeder DPM;  [facility]     CHEILECTOMY Right 10/29/2018    Procedure: CHEILECTOMY;  Surgeon: Katty Schroeder DPM;  [facility]     CONIZATION LEEP N/A 2016    Procedure: CONIZATION LEEP;  Surgeon: Chioma Wiggins MD;  [facility]     LAPAROSCOPIC SALPINGECTOMY Bilateral 2017    Procedure: LAPAROSCOPIC SALPINGECTOMY;  LAPAROSCOPIC BILATERAL SALPINGECTOMY;  Surgeon: Kalin Hernandez MD;  [facility]: [facility]     MAMMOPLASTY AUGMENTATION BILATERAL         Medications:   Current Outpatient Medications   Medication Sig     acyclovir (ZOVIRAX) 5 % cream Apply topically 5 times daily     escitalopram (LEXAPRO) 10 MG tablet Take 1 tablet (10 mg) by mouth daily     ibuprofen (ADVIL/MOTRIN) 800 MG tablet Take 1 tablet (800 mg) by mouth every 8 hours as needed for moderate pain     levonorgestrel (MIRENA, 52 MG,) 20 MCG/24HR IUD 1 each (20 mcg) by Intrauterine route continuous     levothyroxine (SYNTHROID/LEVOTHROID) 75 MCG tablet Take 1 tablet (75 mcg) by mouth daily     ondansetron (ZOFRAN ODT) 4 MG ODT tab Take 1-2 tablets (4-8 mg) by mouth every 8 hours  as needed for nausea or vomiting     VITAMIN D, CHOLECALCIFEROL, PO Take 6,000 Units by mouth daily     No current facility-administered medications for this encounter.        Imaging:  MRI Back: 9/2018   IMPRESSION:   1.  Broad-based left dorsolateral disc protrusion at L5-S1 mildly  narrows the left neural foramen with left L5 ganglionic impingement.     2.   Mild/moderate bilateral facet joint degeneration at L4-5 and  L5-S1.     3.  Annular fissuring along the left dorsolateral aspect of the L5-S1  disc.    X-ray Foot: 1/15/19 IMPRESSION: Changes in the first metatarsal head which are stable and  likely postsurgical. Increase degenerative change in first metatarsal  phalangeal joint with narrowing of the joint but no significant spur  formation.    Musculoskeletal Findings:     OBJECTIVE   Observation:   Patient appears to PT in no acute distress      Palpation: Limited mobility of 1st ray and MTPJ Limited great toe extension to less than 1/2 of uninvolved side.     Assessed rearfoot mobility: comparable to uninvolved.   Midfoot mobility: grossly limited due to muscle and myofascial tension.       Gait: Mildly antalgic with compensated push off during gait cycle related to tension and pain. NO assistive device used.     Crepitus present at planar surface of foot through are into forefoot. FDB tension also appreciated bilaterally.     Balance: Mild decreased balance on R LE compared to L.     Posture: Mild forward flexion at waist.       Ankle Range of Motion  And Strength    R ankle - Active ROM              Dorsiflexion        WFL, but increased GS tension compared to L         Plantarflexion    Full         Inversion           Full         Eversion            Full             Strength: (_/5)    Dorsiflexion    5/5  Plantarflexion 5/5  Inversion        5/5  Eversion        5/5    Great toe extension: 4+/5        L ankle - Active ROM    Dorsiflexion: Full                         Plantaflexion: Full  Inversion:  Full                         Eversion: Full                         Strength: (_/5)    Dorsiflexion    5/5  Plantarflexion 5/5  Inversion        5/5  Eversion        5/5    Great toe extension ROM: R limited by 50% compared to L with also lost  mobility of 1st ray.     Thoracolumbar ROM: flexion finger tips to distal tibia. Extension 40% limited by tension. L lateral flexion: limited and painful R lateral flexion: limited and painful   Pain centered at LB. No radicular symptoms, weakness or paresthesia identified.     Repeated movement: extension procedures resulted in improved extension and lateral flexion motions.     Prognosis/Plan of Care: good  Appropriate for Physical Therapy Intervention: Yes     GOALS:   Short-term goals:  To be achieved in 2 weeks:  Patient will be independent and compliant with HEP within 1-2 weeks.      Long-term goals: 6-8 weeks  1: Patient will be able to perform community distance and in home ambulation without limitation from R great toe pain.   2: Patient will be able to rest and sit on a daily basis without limitation from R great toe pain.   3: Patient will be able to perform bending, lifting and carrying without limitation from LBP during or after performance.   4: Patient will be able to perform prolonged sitting, standing and other work tasks with good postural awareness and without limitation from LBP during or after work.        Planned Interventions: Therapeutic exercise, manual therapy, therapeutic activity, patient education    Iontophoresis as needed for reduction of great toe pain.     Patient presents today with signs and symptoms consistent with high tension and decreased mobility at R great toe and chronic LBP that limits tolerance of daily tasks and work duties. Therapy today consisted of evaluation, patient education, manual techniques. Home exercise development.. Patient would benefit from continued PT sessions addressing overall pain and dysfunction with use of  appropriate interventions.    Treatment Rendered/Intervention:  Evaluation completed as described above followed by discussion of exam findings and plan of care.    Therapeutic exercise: Patient instructed in and demonstrated proper performance of home exercise program consisting of:  Self tennis ball plantar mobilization of foot, self stretch of great toe and forefoot into extension. Prone to prone on elbows and postural support at work.     Educated in posture principles and neutral spine positioning. Patient was instructed in home use of heat and/or ice for pain management    Clinical Impressions:  Criteria for Skilled Therapeutic Intervention Met: yes  PT Diagnosis: Patient presents with pain and limited mobility of R great toe related to long standing pain and more recent surgical intervention. Patient is also experiencing back pain that limits tolerance of daily tasks and work activities.   Influenced by the following impairments: Pain, limited mobility, tension, myofascial tension.   Functional limitations due to impairment: Pain in R foot/great toe with rest and activity. Pain in LB with prolonged sitting and standing. Both of which limit tolerance to functional activities and work tasks.   Clinical presentation: Stable/Uncomplicated  Clinical presentation rationale: clinical judgement  Clinical Decision making (complexity): Moderate Complexity  Predicted Duration of Therapy Intervention (days/wks): 8 weeks  Risks and Benefits of therapy have been explained: Yes  Patient, Family & other staff in agreement with plan of care: Yes  Frequency: 1-2 times/week      Total Evaluation Time: 25    I certify the need for these services furnished under this plan of treatment and while under my care. (Physician co-signature of this document indicates review and certification of the therapy plan).

## 2019-03-05 ENCOUNTER — HOSPITAL ENCOUNTER (OUTPATIENT)
Dept: PHYSICAL THERAPY | Facility: HOSPITAL | Age: 39
Setting detail: THERAPIES SERIES
End: 2019-03-05
Attending: PODIATRIST
Payer: COMMERCIAL

## 2019-03-05 PROCEDURE — 97140 MANUAL THERAPY 1/> REGIONS: CPT | Mod: GP

## 2019-03-21 ENCOUNTER — HOSPITAL ENCOUNTER (OUTPATIENT)
Dept: PHYSICAL THERAPY | Facility: HOSPITAL | Age: 39
Setting detail: THERAPIES SERIES
End: 2019-03-21
Attending: PODIATRIST
Payer: COMMERCIAL

## 2019-03-21 PROCEDURE — 97140 MANUAL THERAPY 1/> REGIONS: CPT | Mod: GP

## 2019-03-21 PROCEDURE — 97110 THERAPEUTIC EXERCISES: CPT | Mod: GP

## 2019-03-25 ENCOUNTER — HOSPITAL ENCOUNTER (OUTPATIENT)
Dept: PHYSICAL THERAPY | Facility: HOSPITAL | Age: 39
Setting detail: THERAPIES SERIES
End: 2019-03-25
Attending: PODIATRIST
Payer: COMMERCIAL

## 2019-03-25 PROCEDURE — 97140 MANUAL THERAPY 1/> REGIONS: CPT | Mod: GP

## 2019-03-28 ENCOUNTER — HOSPITAL ENCOUNTER (OUTPATIENT)
Dept: PHYSICAL THERAPY | Facility: HOSPITAL | Age: 39
Setting detail: THERAPIES SERIES
End: 2019-03-28
Attending: PODIATRIST
Payer: COMMERCIAL

## 2019-04-16 ENCOUNTER — HOSPITAL ENCOUNTER (OUTPATIENT)
Dept: PHYSICAL THERAPY | Facility: HOSPITAL | Age: 39
Setting detail: THERAPIES SERIES
End: 2019-04-16
Attending: PODIATRIST
Payer: COMMERCIAL

## 2019-04-16 PROCEDURE — 97140 MANUAL THERAPY 1/> REGIONS: CPT | Mod: GP

## 2019-04-23 ENCOUNTER — HOSPITAL ENCOUNTER (OUTPATIENT)
Dept: PHYSICAL THERAPY | Facility: HOSPITAL | Age: 39
Setting detail: THERAPIES SERIES
End: 2019-04-23
Attending: PODIATRIST
Payer: COMMERCIAL

## 2019-04-23 PROCEDURE — 97140 MANUAL THERAPY 1/> REGIONS: CPT | Mod: GP

## 2019-05-07 ENCOUNTER — HOSPITAL ENCOUNTER (OUTPATIENT)
Dept: PHYSICAL THERAPY | Facility: HOSPITAL | Age: 39
Setting detail: THERAPIES SERIES
End: 2019-05-07
Attending: PHYSICIAN ASSISTANT
Payer: COMMERCIAL

## 2019-05-07 PROCEDURE — 97140 MANUAL THERAPY 1/> REGIONS: CPT | Mod: GP

## 2019-06-10 ENCOUNTER — OFFICE VISIT (OUTPATIENT)
Dept: OBGYN | Facility: OTHER | Age: 39
End: 2019-06-10
Attending: ADVANCED PRACTICE MIDWIFE
Payer: COMMERCIAL

## 2019-06-10 VITALS
BODY MASS INDEX: 28.88 KG/M2 | DIASTOLIC BLOOD PRESSURE: 62 MMHG | SYSTOLIC BLOOD PRESSURE: 100 MMHG | HEIGHT: 67 IN | WEIGHT: 184 LBS | HEART RATE: 64 BPM

## 2019-06-10 DIAGNOSIS — E04.9 GOITER: ICD-10-CM

## 2019-06-10 DIAGNOSIS — Z12.31 ENCOUNTER FOR SCREENING MAMMOGRAM FOR BREAST CANCER: ICD-10-CM

## 2019-06-10 DIAGNOSIS — Z01.419 WELL WOMAN EXAM WITH ROUTINE GYNECOLOGICAL EXAM: Primary | ICD-10-CM

## 2019-06-10 DIAGNOSIS — Z12.4 ENCOUNTER FOR SCREENING FOR CERVICAL CANCER: ICD-10-CM

## 2019-06-10 LAB
CHOLEST SERPL-MCNC: 163 MG/DL
ERYTHROCYTE [DISTWIDTH] IN BLOOD BY AUTOMATED COUNT: 12.6 % (ref 10–15)
EST. AVERAGE GLUCOSE BLD GHB EST-MCNC: 94 MG/DL
GLUCOSE SERPL-MCNC: 89 MG/DL (ref 70–99)
HBA1C MFR BLD: 4.9 % (ref 0–5.6)
HCT VFR BLD AUTO: 40.4 % (ref 35–47)
HDLC SERPL-MCNC: 63 MG/DL
HGB BLD-MCNC: 14.4 G/DL (ref 11.7–15.7)
LDLC SERPL CALC-MCNC: 85 MG/DL
MCH RBC QN AUTO: 30.3 PG (ref 26.5–33)
MCHC RBC AUTO-ENTMCNC: 35.6 G/DL (ref 31.5–36.5)
MCV RBC AUTO: 85 FL (ref 78–100)
NONHDLC SERPL-MCNC: 100 MG/DL
PLATELET # BLD AUTO: 208 10E9/L (ref 150–450)
RBC # BLD AUTO: 4.76 10E12/L (ref 3.8–5.2)
TRIGL SERPL-MCNC: 73 MG/DL
TSH SERPL DL<=0.005 MIU/L-ACNC: 3.12 MU/L (ref 0.4–4)
WBC # BLD AUTO: 5.3 10E9/L (ref 4–11)

## 2019-06-10 PROCEDURE — 36415 COLL VENOUS BLD VENIPUNCTURE: CPT | Performed by: ADVANCED PRACTICE MIDWIFE

## 2019-06-10 PROCEDURE — 99396 PREV VISIT EST AGE 40-64: CPT | Performed by: ADVANCED PRACTICE MIDWIFE

## 2019-06-10 PROCEDURE — 82947 ASSAY GLUCOSE BLOOD QUANT: CPT | Performed by: ADVANCED PRACTICE MIDWIFE

## 2019-06-10 PROCEDURE — 87624 HPV HI-RISK TYP POOLED RSLT: CPT | Mod: 90 | Performed by: ADVANCED PRACTICE MIDWIFE

## 2019-06-10 PROCEDURE — 80061 LIPID PANEL: CPT | Performed by: ADVANCED PRACTICE MIDWIFE

## 2019-06-10 PROCEDURE — 99000 SPECIMEN HANDLING OFFICE-LAB: CPT | Performed by: ADVANCED PRACTICE MIDWIFE

## 2019-06-10 PROCEDURE — 85027 COMPLETE CBC AUTOMATED: CPT | Performed by: ADVANCED PRACTICE MIDWIFE

## 2019-06-10 PROCEDURE — 84443 ASSAY THYROID STIM HORMONE: CPT | Performed by: ADVANCED PRACTICE MIDWIFE

## 2019-06-10 PROCEDURE — 88142 CYTOPATH C/V THIN LAYER: CPT | Performed by: ADVANCED PRACTICE MIDWIFE

## 2019-06-10 PROCEDURE — 83036 HEMOGLOBIN GLYCOSYLATED A1C: CPT | Performed by: ADVANCED PRACTICE MIDWIFE

## 2019-06-10 RX ORDER — LEVOTHYROXINE SODIUM 75 UG/1
75 TABLET ORAL DAILY
Qty: 90 TABLET | Refills: 4 | Status: SHIPPED | OUTPATIENT
Start: 2019-06-10 | End: 2020-07-23

## 2019-06-10 ASSESSMENT — ANXIETY QUESTIONNAIRES
GAD7 TOTAL SCORE: 0
4. TROUBLE RELAXING: NOT AT ALL
1. FEELING NERVOUS, ANXIOUS, OR ON EDGE: NOT AT ALL
2. NOT BEING ABLE TO STOP OR CONTROL WORRYING: NOT AT ALL
5. BEING SO RESTLESS THAT IT IS HARD TO SIT STILL: NOT AT ALL
3. WORRYING TOO MUCH ABOUT DIFFERENT THINGS: NOT AT ALL
6. BECOMING EASILY ANNOYED OR IRRITABLE: NOT AT ALL
7. FEELING AFRAID AS IF SOMETHING AWFUL MIGHT HAPPEN: NOT AT ALL

## 2019-06-10 ASSESSMENT — MIFFLIN-ST. JEOR: SCORE: 1537.25

## 2019-06-10 ASSESSMENT — PATIENT HEALTH QUESTIONNAIRE - PHQ9: SUM OF ALL RESPONSES TO PHQ QUESTIONS 1-9: 0

## 2019-06-10 ASSESSMENT — PAIN SCALES - GENERAL: PAINLEVEL: NO PAIN (0)

## 2019-06-10 NOTE — PATIENT INSTRUCTIONS
Return to office in one year for well woman care and as needed.    Thank you for allowing Alejandro ORELLANA CNM and our OB team to participate in your care.  If you have a scheduling or an appointment question please contact [facility] Unit Coordinator at their direct line [phone].   ALL nursing questions or concerns can be directed to your OB nurse at: [phone] Honorio Nix/Debra

## 2019-06-10 NOTE — PROGRESS NOTES
ANNUAL    Sheridan is a 38 year old  female who presents for annual exam.   Youngest child 9  Largest Child 8 lb 2 oz  GDM n  Hypertension end of first pregnancy  No LMP recorded. (Menstrual status: IUD).  Menses:  Cycles Amenorrhea  When did they start 11  How many days bleed Occasional spotting pain Contraception Mirena IUD and shannon. salpingectomy.  Planning pregnancy: n  Concerns in addition to routine health maintenance?  none.  GYNECOLOGIC HISTORY:   Surgery: Bilateral tubal removal, LEEP  History sexually transmitted infections:No STD history   Safe?  y  STI testing offered?  y  History of abnormal Pap smear: y  Problems with sex? (bleeding/pain) n  Family history of: breast cancer: n   Uterine cancer: 1st cousin ovarian cancer: n   colon cancer: n    HEALTH MAINTENANCE:  Cholesterol: (  Cholesterol   Date Value Ref Range Status   2018 144 <200 mg/dL Final   2017 140 <200 mg/dL Final    History of abnormal lipids: n  Mammo: y . History of abnormal Mammo:n   Regular Self Breast Exams: n Calcium/Vitamin D or Vitamin: y Exercise y walking    TSH: (  TSH   Date Value Ref Range Status   2018 1.95 0.40 - 4.00 mU/L Final    )  Pap; (  Lab Results   Component Value Date    PAP NIL 2018    PAP ASC-US 2017    PAP LSIL 2016    )    HISTORY:  OB History    Para Term  AB Living   2 2 2 0 0 2   SAB TAB Ectopic Multiple Live Births   0 0 0 0 2      # Outcome Date GA Lbr Renato/2nd Weight Sex Delivery Anes PTL Lv   2 Term 2008 39w0d  3.685 kg (8 lb 2 oz) F Vag-Spont   RITA      Name: Araceli   1 Term 2006 41w0d 08:00 3.459 kg (7 lb 10 oz) F Vag-Spont   RITA      Name: Bladimir     Past Medical History:   Diagnosis Date     Anxiety state, unspecified 3/12/2007     Breech presentation without mention of version, antepartum 1/10/2006     Galactorrhea not associated with childbirth 05/10/2005     Leukorrhea, not specified as infective 2005     Post term pregnancy,  antepartum condition or complication 2006     Spotting complicating pregnancy, antepartum condition or complication 2008     Supervision of other normal pregnancy 2005     Threatened , antepartum 2007     Past Surgical History:   Procedure Laterality Date     ABDOMINOPLASTY       BUNIONECTOMY Right 10/29/2018    Procedure: RIGHT 1ST METATARSOPHALANGEAL JOINT CHEILECTOMY WITH IMPLANT;  Surgeon: Katty Schroeder DPM;  [facility]     CHEILECTOMY Right 10/29/2018    Procedure: CHEILECTOMY;  Surgeon: Katty Schroeder DPM;  [facility]     CONIZATION LEEP N/A 2016    Procedure: CONIZATION LEEP;  Surgeon: Chioma Wiggins MD;  [facility]     LAPAROSCOPIC SALPINGECTOMY Bilateral 2017    Procedure: LAPAROSCOPIC SALPINGECTOMY;  LAPAROSCOPIC BILATERAL SALPINGECTOMY;  Surgeon: Kalin Hernandez MD;  [facility]     MAMMOPLASTY AUGMENTATION BILATERAL       Family History   Problem Relation Age of Onset     Hypertension Father      Other - See Comments Father         lymphoma     Uterine Cancer Cousin      Social History     Socioeconomic History     Marital status:      Spouse name: None     Number of children: None     Years of education: None     Highest education level: None   Occupational History     Occupation: Rn     Employer: [employer]   Social Needs     Financial resource strain: None     Food insecurity:     Worry: None     Inability: None     Transportation needs:     Medical: None     Non-medical: None   Tobacco Use     Smoking status: Former Smoker     Last attempt to quit: 2002     Years since quittin.4     Smokeless tobacco: Never Used   Substance and Sexual Activity     Alcohol use: Yes     Comment: socially     Drug use: No     Sexual activity: Yes     Partners: Male   Lifestyle     Physical activity:     Days per week: None     Minutes per session: None     Stress: None   Relationships     Social connections:     Talks on  phone: None     Gets together: None     Attends Quaker service: None     Active member of club or organization: None     Attends meetings of clubs or organizations: None     Relationship status: None     Intimate partner violence:     Fear of current or ex partner: None     Emotionally abused: None     Physically abused: None     Forced sexual activity: None   Other Topics Concern      Service Not Asked     Blood Transfusions Not Asked     Caffeine Concern Yes     Comment: coffee 3 cups     Occupational Exposure Not Asked     Hobby Hazards Not Asked     Sleep Concern Not Asked     Stress Concern Not Asked     Weight Concern Not Asked     Special Diet Not Asked     Back Care Not Asked     Exercise Not Asked     Bike Helmet Not Asked     Seat Belt Not Asked     Self-Exams Not Asked     Parent/sibling w/ CABG, MI or angioplasty before 65F 55M? No   Social History Narrative     None       Current Outpatient Medications:      ibuprofen (ADVIL/MOTRIN) 800 MG tablet, Take 1 tablet (800 mg) by mouth every 8 hours as needed for moderate pain, Disp: 40 tablet, Rfl: 1     levonorgestrel (MIRENA, 52 MG,) 20 MCG/24HR IUD, 1 each (20 mcg) by Intrauterine route continuous, Disp: 1 each, Rfl: 0     levothyroxine (SYNTHROID/LEVOTHROID) 75 MCG tablet, Take 1 tablet (75 mcg) by mouth daily, Disp: 90 tablet, Rfl: 4     VITAMIN D, CHOLECALCIFEROL, PO, Take 6,000 Units by mouth daily, Disp: , Rfl:      acyclovir (ZOVIRAX) 5 % cream, Apply topically 5 times daily (Patient not taking: Reported on 6/10/2019), Disp: 5 g, Rfl: 12     ondansetron (ZOFRAN ODT) 4 MG ODT tab, Take 1-2 tablets (4-8 mg) by mouth every 8 hours as needed for nausea or vomiting (Patient not taking: Reported on 6/10/2019), Disp: 40 tablet, Rfl: 1     Allergies   Allergen Reactions     Erythromycin Nausea and Vomiting     Erythromycin base     Penicillins Rash       Past medical, surgical, social and family history were reviewed and updated in EPIC.    ROS:   "  CONSTITUTIONAL:     NEGATIVE for fever, chills, change in weight  INTEGUMENTARY/SKIN:       NEGATIVE for worrisome rashes, moles or lesions  EYES:     NEGATIVE for vision changes or irritation  ENT/MOUTH: NEGATIVE for ear, mouth and throat problems  RESP:     NEGATIVE for significant cough or SOB  CV:   NEGATIVE for chest pain, palpitations or peripheral edema  GI:     NEGATIVE for nausea, abdominal pain, heartburn, or change in bowel habits  :   NEGATIVE for frequency, dysuria, hematuria, vaginal discharge, or irregular bleeding,incontinence   MUSCULOSKELETAL:     NEGATIVE for significant arthralgias or myalgia  NEURO:      NEGATIVE for weakness, dizziness or paresthesias  ENDOCRINE:      NEGATIVE for temperature intolerance, skin/hair changes  PSYCHIATRIC:      NEGATIVE for changes in mood or affect.     EXAM:   /62   Pulse 64   Ht 1.702 m (5' 7\")   Wt 83.5 kg (184 lb)   BMI 28.82 kg/m     BMI: Body mass index is 28.82 kg/m .  Constitutional: healthy, alert and no distress  Head: Normocephalic. No masses, lesions, tenderness or abnormalities  Neck: Neck supple. Trachea midline. No adenopathy. Thyroid symmetric, normal size.   Cardiovascular: RRR.   Respiratory: lungs clear   Breast: Breasts reveal mild symmetric fibrocystic densities, but there are no dominant, discrete, fixed or suspicious masses found.  Gastrointestinal: Abdomen soft, non-tender, non-distended. No masses, organomegaly.  :  Vulva:  No external lesions, normal female hair distribution, no inguinal adenopathy.    Urethra:  Midline, non-tender, well supported, no discharge  Vagina:  Moist, pink, no abnormal discharge, no lesions  Cervix: clean   Uterus:   Normal size, non-tender, freely mobile  Ovaries:  No masses appreciated  Rectal Exam: deferred  Musculoskeletal: extremities normal  Skin: no suspicious lesions or rashes  Psychiatric: Affect appropriate, cooperative,mentation appears normal.     COUNSELING:   regular " exercise  healthy diet/nutrition  Immunizations   contraception  family planning  Folic Acid Counseling   reports that she quit smoking about 17 years ago. She has never used smokeless tobacco.  Tobacco Cessation Action Plan: na  Body mass index is 28.82 kg/m .  Weight management plan: healthy eating and exercise  FRAX Risk Assessment    ASSESSMENT:  38 year old female with satisfactory annual exam  Need of cervical cancer screening  Hx of bilateral salpingectomy  Mirena IUD for bleeding control  IUD strings visualized  Hx of abnormal Pap  Need of annual mammogram  Hx of goiter    PLAN:   Pap with High Risk hpv  Tsh,fasting cholesterol with lipid profile,fasting blood sugar,hp,hga1c  Order annual mammogram   Synthroid renewed  Return to office: 1 year for well woman care and as needed.    Total visit greater than 30 minutes with 25 minutes spent discussing well woman care, health promotion, and disease prevention.    REYNA Koch, CNM

## 2019-06-11 ASSESSMENT — ANXIETY QUESTIONNAIRES: GAD7 TOTAL SCORE: 0

## 2019-06-17 LAB
COPATH REPORT: NORMAL
PAP: NORMAL

## 2019-07-03 ENCOUNTER — ANCILLARY PROCEDURE (OUTPATIENT)
Dept: MAMMOGRAPHY | Facility: OTHER | Age: 39
End: 2019-07-03
Attending: ADVANCED PRACTICE MIDWIFE
Payer: COMMERCIAL

## 2019-07-03 DIAGNOSIS — Z12.31 ENCOUNTER FOR SCREENING MAMMOGRAM FOR BREAST CANCER: ICD-10-CM

## 2019-07-03 DIAGNOSIS — Z01.419 WELL WOMAN EXAM WITH ROUTINE GYNECOLOGICAL EXAM: ICD-10-CM

## 2019-07-03 PROCEDURE — 77067 SCR MAMMO BI INCL CAD: CPT | Mod: TC

## 2019-07-03 PROCEDURE — 77063 BREAST TOMOSYNTHESIS BI: CPT | Mod: TC

## 2019-11-19 ENCOUNTER — APPOINTMENT (OUTPATIENT)
Dept: LAB | Facility: OTHER | Age: 39
End: 2019-11-19
Attending: NURSE PRACTITIONER
Payer: COMMERCIAL

## 2019-11-19 DIAGNOSIS — B96.89 BV (BACTERIAL VAGINOSIS): Primary | ICD-10-CM

## 2019-11-19 DIAGNOSIS — N89.8 VAGINAL DISCHARGE: Primary | ICD-10-CM

## 2019-11-19 DIAGNOSIS — N76.0 BV (BACTERIAL VAGINOSIS): Primary | ICD-10-CM

## 2019-11-19 LAB
SPECIMEN SOURCE: ABNORMAL
WET PREP SPEC: ABNORMAL

## 2019-11-19 PROCEDURE — 87210 SMEAR WET MOUNT SALINE/INK: CPT | Performed by: NURSE PRACTITIONER

## 2019-11-19 RX ORDER — METRONIDAZOLE 500 MG/1
500 TABLET ORAL 2 TIMES DAILY
Qty: 14 TABLET | Refills: 0 | Status: SHIPPED | OUTPATIENT
Start: 2019-11-19 | End: 2020-04-03

## 2019-12-02 ENCOUNTER — ALLIED HEALTH/NURSE VISIT (OUTPATIENT)
Dept: FAMILY MEDICINE | Facility: OTHER | Age: 39
End: 2019-12-02
Attending: PHYSICIAN ASSISTANT
Payer: COMMERCIAL

## 2019-12-02 DIAGNOSIS — Z11.1 VISIT FOR MANTOUX TEST: Primary | ICD-10-CM

## 2019-12-02 PROCEDURE — 86580 TB INTRADERMAL TEST: CPT

## 2019-12-02 NOTE — PROGRESS NOTES
The patient is asked the following questions today and these are her answers:    -Have you had a mantoux administered in the past 30 days?    No  -Have you had a previous positive Mantoux.  No  -Have you received BCG in the past.  No  -Have you had a live vaccine  (MMR, Varicella, OPV, Yellow Fever) in the last 6 weeks.  No  -Have you had and active  viral or bacterial infection in the past 6 weeks.  No  -Have you received corticosteroids or immunosuppressive agents in the past 6 weeks.  No  -Have you been diagnosed with HIV?  No  -Do you have a malignancy?  No    Mantoux Questionnaire: answers were all negative.    Given in Right Forearm at 1552. Patient will come back in 48-72 hours to have read.    Rima Maxwell LPN

## 2019-12-04 ENCOUNTER — ALLIED HEALTH/NURSE VISIT (OUTPATIENT)
Dept: FAMILY MEDICINE | Facility: OTHER | Age: 39
End: 2019-12-04
Attending: FAMILY MEDICINE
Payer: COMMERCIAL

## 2019-12-04 DIAGNOSIS — Z11.1 VISIT FOR MANTOUX TEST: Primary | ICD-10-CM

## 2019-12-04 NOTE — PROGRESS NOTES
Mantoux results: No induration.  No swelling.  No redness.    Mantoux read in Right Forearm at 1622.    Rima Maxwell LPN

## 2020-04-02 ENCOUNTER — MYC MEDICAL ADVICE (OUTPATIENT)
Dept: FAMILY MEDICINE | Facility: OTHER | Age: 40
End: 2020-04-02

## 2020-04-03 ENCOUNTER — OFFICE VISIT (OUTPATIENT)
Dept: FAMILY MEDICINE | Facility: OTHER | Age: 40
End: 2020-04-03
Attending: NURSE PRACTITIONER
Payer: COMMERCIAL

## 2020-04-03 ENCOUNTER — HOSPITAL ENCOUNTER (OUTPATIENT)
Dept: ULTRASOUND IMAGING | Facility: HOSPITAL | Age: 40
End: 2020-04-03
Attending: NURSE PRACTITIONER
Payer: COMMERCIAL

## 2020-04-03 ENCOUNTER — ANCILLARY PROCEDURE (OUTPATIENT)
Dept: MAMMOGRAPHY | Facility: OTHER | Age: 40
End: 2020-04-03
Attending: NURSE PRACTITIONER
Payer: COMMERCIAL

## 2020-04-03 VITALS
HEIGHT: 67 IN | DIASTOLIC BLOOD PRESSURE: 60 MMHG | BODY MASS INDEX: 27.94 KG/M2 | SYSTOLIC BLOOD PRESSURE: 103 MMHG | TEMPERATURE: 98.8 F | WEIGHT: 178 LBS | OXYGEN SATURATION: 98 % | HEART RATE: 98 BPM

## 2020-04-03 DIAGNOSIS — N64.4 BREAST PAIN: Primary | ICD-10-CM

## 2020-04-03 DIAGNOSIS — N64.4 BREAST PAIN: ICD-10-CM

## 2020-04-03 LAB
ALBUMIN SERPL-MCNC: 4.2 G/DL (ref 3.4–5)
ALP SERPL-CCNC: 51 U/L (ref 40–150)
ALT SERPL W P-5'-P-CCNC: 24 U/L (ref 0–50)
ANION GAP SERPL CALCULATED.3IONS-SCNC: 5 MMOL/L (ref 3–14)
AST SERPL W P-5'-P-CCNC: 14 U/L (ref 0–45)
BASOPHILS # BLD AUTO: 0 10E9/L (ref 0–0.2)
BASOPHILS NFR BLD AUTO: 0.9 %
BILIRUB SERPL-MCNC: 0.8 MG/DL (ref 0.2–1.3)
BUN SERPL-MCNC: 9 MG/DL (ref 7–30)
CALCIUM SERPL-MCNC: 8.9 MG/DL (ref 8.5–10.1)
CHLORIDE SERPL-SCNC: 108 MMOL/L (ref 94–109)
CO2 SERPL-SCNC: 26 MMOL/L (ref 20–32)
CREAT SERPL-MCNC: 0.76 MG/DL (ref 0.52–1.04)
CRP SERPL-MCNC: <2.9 MG/L (ref 0–8)
DIFFERENTIAL METHOD BLD: ABNORMAL
EOSINOPHIL # BLD AUTO: 0.1 10E9/L (ref 0–0.7)
EOSINOPHIL NFR BLD AUTO: 1.8 %
ERYTHROCYTE [DISTWIDTH] IN BLOOD BY AUTOMATED COUNT: 12.5 % (ref 10–15)
GFR SERPL CREATININE-BSD FRML MDRD: >90 ML/MIN/{1.73_M2}
GLUCOSE SERPL-MCNC: 99 MG/DL (ref 70–99)
HCT VFR BLD AUTO: 44.9 % (ref 35–47)
HGB BLD-MCNC: 15.4 G/DL (ref 11.7–15.7)
IMM GRANULOCYTES # BLD: 0 10E9/L (ref 0–0.4)
IMM GRANULOCYTES NFR BLD: 0.2 %
LYMPHOCYTES # BLD AUTO: 2.1 10E9/L (ref 0.8–5.3)
LYMPHOCYTES NFR BLD AUTO: 46.8 %
MCH RBC QN AUTO: 29.4 PG (ref 26.5–33)
MCHC RBC AUTO-ENTMCNC: 34.3 G/DL (ref 31.5–36.5)
MCV RBC AUTO: 86 FL (ref 78–100)
MONOCYTES # BLD AUTO: 0.3 10E9/L (ref 0–1.3)
MONOCYTES NFR BLD AUTO: 6.4 %
NEUTROPHILS # BLD AUTO: 2 10E9/L (ref 1.6–8.3)
NEUTROPHILS NFR BLD AUTO: 43.9 %
NRBC # BLD AUTO: 0 10*3/UL
NRBC BLD AUTO-RTO: 0 /100
PLATELET # BLD AUTO: 242 10E9/L (ref 150–450)
POTASSIUM SERPL-SCNC: 3.8 MMOL/L (ref 3.4–5.3)
PROT SERPL-MCNC: 7.3 G/DL (ref 6.8–8.8)
RBC # BLD AUTO: 5.24 10E12/L (ref 3.8–5.2)
SODIUM SERPL-SCNC: 139 MMOL/L (ref 133–144)
TSH SERPL DL<=0.005 MIU/L-ACNC: 4.99 MU/L (ref 0.4–4)
WBC # BLD AUTO: 4.6 10E9/L (ref 4–11)

## 2020-04-03 PROCEDURE — 86140 C-REACTIVE PROTEIN: CPT | Performed by: NURSE PRACTITIONER

## 2020-04-03 PROCEDURE — G0279 TOMOSYNTHESIS, MAMMO: HCPCS | Mod: TC

## 2020-04-03 PROCEDURE — 76642 ULTRASOUND BREAST LIMITED: CPT | Mod: TC,50

## 2020-04-03 PROCEDURE — 80050 GENERAL HEALTH PANEL: CPT | Performed by: NURSE PRACTITIONER

## 2020-04-03 PROCEDURE — 76641 ULTRASOUND BREAST COMPLETE: CPT | Mod: TC,50

## 2020-04-03 PROCEDURE — 99214 OFFICE O/P EST MOD 30 MIN: CPT | Performed by: NURSE PRACTITIONER

## 2020-04-03 PROCEDURE — 36415 COLL VENOUS BLD VENIPUNCTURE: CPT | Performed by: NURSE PRACTITIONER

## 2020-04-03 PROCEDURE — 77066 DX MAMMO INCL CAD BI: CPT | Mod: TC

## 2020-04-03 ASSESSMENT — MIFFLIN-ST. JEOR: SCORE: 1505.03

## 2020-04-03 ASSESSMENT — PAIN SCALES - GENERAL: PAINLEVEL: MILD PAIN (2)

## 2020-04-03 NOTE — NURSING NOTE
"Chief Complaint   Patient presents with     bilateral breat pain       Initial /60 (BP Location: Right arm, Patient Position: Sitting, Cuff Size: Adult Regular)   Pulse 98   Temp 98.8  F (37.1  C) (Tympanic)   Ht 1.702 m (5' 7\")   Wt 80.7 kg (178 lb)   SpO2 98%   BMI 27.88 kg/m   Estimated body mass index is 27.88 kg/m  as calculated from the following:    Height as of this encounter: 1.702 m (5' 7\").    Weight as of this encounter: 80.7 kg (178 lb).  Medication Reconciliation: complete  Anamaria Mayes LPN  "

## 2020-07-20 DIAGNOSIS — E04.9 GOITER: ICD-10-CM

## 2020-07-22 NOTE — TELEPHONE ENCOUNTER
Levothyroxine 75 MCG      Last Written Prescription Date:  6-10-19  Last Fill Quantity: 90,   # refills: 4  Last Office Visit: 4-3-2020  Future Office visit:

## 2020-07-23 RX ORDER — LEVOTHYROXINE SODIUM 75 UG/1
TABLET ORAL
Qty: 90 TABLET | Refills: 0 | Status: SHIPPED | OUTPATIENT
Start: 2020-07-23 | End: 2021-01-20

## 2020-07-24 ENCOUNTER — MYC MEDICAL ADVICE (OUTPATIENT)
Dept: OBGYN | Facility: OTHER | Age: 40
End: 2020-07-24

## 2020-07-24 DIAGNOSIS — N76.0 BACTERIAL VAGINOSIS: Primary | ICD-10-CM

## 2020-07-24 DIAGNOSIS — B96.89 BACTERIAL VAGINOSIS: Primary | ICD-10-CM

## 2020-07-24 RX ORDER — METRONIDAZOLE 500 MG/1
2000 TABLET ORAL ONCE
Qty: 4 TABLET | Refills: 0 | Status: SHIPPED | OUTPATIENT
Start: 2020-07-24 | End: 2020-07-24

## 2020-07-24 NOTE — TELEPHONE ENCOUNTER
Pt is notified Alejandro is out of the office until Monday but is requesting a prescription of Flagyl to be sent in to [facility] in [place] for recurrent BV. Rx pended if OK and encounter forwarded to Dr. Jimenez.

## 2020-10-02 ENCOUNTER — APPOINTMENT (OUTPATIENT)
Dept: OCCUPATIONAL MEDICINE | Facility: OTHER | Age: 40
End: 2020-10-02

## 2020-10-02 PROCEDURE — 86580 TB INTRADERMAL TEST: CPT

## 2020-10-12 ENCOUNTER — APPOINTMENT (OUTPATIENT)
Dept: OCCUPATIONAL MEDICINE | Facility: OTHER | Age: 40
End: 2020-10-12

## 2020-10-12 PROCEDURE — 86580 TB INTRADERMAL TEST: CPT

## 2020-11-24 ENCOUNTER — OFFICE VISIT (OUTPATIENT)
Dept: OBGYN | Facility: OTHER | Age: 40
End: 2020-11-24
Attending: OBSTETRICS & GYNECOLOGY
Payer: COMMERCIAL

## 2020-11-24 VITALS
HEIGHT: 67 IN | DIASTOLIC BLOOD PRESSURE: 78 MMHG | BODY MASS INDEX: 28.56 KG/M2 | SYSTOLIC BLOOD PRESSURE: 110 MMHG | WEIGHT: 182 LBS | HEART RATE: 60 BPM

## 2020-11-24 DIAGNOSIS — N90.89 VULVAR LUMP: Primary | ICD-10-CM

## 2020-11-24 PROCEDURE — 99212 OFFICE O/P EST SF 10 MIN: CPT | Performed by: OBSTETRICS & GYNECOLOGY

## 2020-11-24 ASSESSMENT — PAIN SCALES - GENERAL: PAINLEVEL: NO PAIN (0)

## 2020-11-24 ASSESSMENT — MIFFLIN-ST. JEOR: SCORE: 1518.18

## 2020-11-24 NOTE — NURSING NOTE
"Chief Complaint   Patient presents with     Vaginal Problem       Initial /78   Pulse 60   Ht 1.702 m (5' 7\")   Wt 82.6 kg (182 lb)   BMI 28.51 kg/m   Estimated body mass index is 28.51 kg/m  as calculated from the following:    Height as of this encounter: 1.702 m (5' 7\").    Weight as of this encounter: 82.6 kg (182 lb).  Medication Reconciliation: complete  Debra Dave LPN    "

## 2020-11-24 NOTE — PROGRESS NOTES
S:   Felt some vulvar irregularity on both sides when in the shower.  Can't see anything unusual.  She any sores, lesion, pain,itching, irritation.      O:   External, medial, internal exam is normal.  NO unusual lesions or irregularities seen.      A:   Normal vulvar exam      P:   Self exam every month.        Keep annual with Alejandro.      :

## 2020-12-01 ENCOUNTER — OFFICE VISIT (OUTPATIENT)
Dept: OBGYN | Facility: OTHER | Age: 40
End: 2020-12-01
Attending: ADVANCED PRACTICE MIDWIFE
Payer: COMMERCIAL

## 2020-12-01 VITALS
HEIGHT: 67 IN | DIASTOLIC BLOOD PRESSURE: 68 MMHG | WEIGHT: 182 LBS | BODY MASS INDEX: 28.56 KG/M2 | SYSTOLIC BLOOD PRESSURE: 115 MMHG

## 2020-12-01 DIAGNOSIS — R22.9 PERINEAL LUMP: Primary | ICD-10-CM

## 2020-12-01 PROCEDURE — 99213 OFFICE O/P EST LOW 20 MIN: CPT | Performed by: ADVANCED PRACTICE MIDWIFE

## 2020-12-01 ASSESSMENT — PAIN SCALES - GENERAL: PAINLEVEL: NO PAIN (0)

## 2020-12-01 ASSESSMENT — MIFFLIN-ST. JEOR: SCORE: 1518.18

## 2020-12-01 NOTE — PATIENT INSTRUCTIONS
Return to office in one year for well woman care and as needed.    Thank you for allowing Alejandro ORELLANA CNM and our OB team to participate in your care.  If you have a scheduling or an appointment question please contact [facility] [place] [place] [place] [facility] [place] [facility] Coordinator at their direct line [phone].   ALL nursing questions or concerns can be directed to your OB nurse at: [phone] Honorio Nix/Debra

## 2020-12-01 NOTE — PROGRESS NOTES
"Sheridan Roldan is a 40 year old female  Here today for perineal lump.      O:   /68 (BP Location: Left arm, Patient Position: Chair, Cuff Size: Adult Regular)   Ht 1.702 m (5' 7\")   Wt 82.6 kg (182 lb)   BMI 28.51 kg/m       Pleasant without acute distress.  Perineum clean with a several small papules without redness or drainage.    A:    Possible folliculitis   Lesions without redness or drainage      P:    Discussed shaving and perineal hygiene  May try benzoyl peroxide PRN    Total visit greater than 15 minutes with 10 minutes spent face to face counseling this patient on folliculitis, perineum acne, shaving, in-grown hairs, perineum care, benzoyl peroxide treatment, and signs of infection.    Alejandro Kee, REYNA, CNM    "

## 2020-12-01 NOTE — NURSING NOTE
"Chief Complaint   Patient presents with     Vaginal Problem       Initial /68 (BP Location: Left arm, Patient Position: Chair, Cuff Size: Adult Regular)   Ht 1.702 m (5' 7\")   Wt 82.6 kg (182 lb)   BMI 28.51 kg/m   Estimated body mass index is 28.51 kg/m  as calculated from the following:    Height as of this encounter: 1.702 m (5' 7\").    Weight as of this encounter: 82.6 kg (182 lb).  Medication Reconciliation: complete  Dinah Trammell LPN    "

## 2020-12-20 ENCOUNTER — HEALTH MAINTENANCE LETTER (OUTPATIENT)
Age: 40
End: 2020-12-20

## 2021-01-19 ENCOUNTER — MYC REFILL (OUTPATIENT)
Dept: OBGYN | Facility: OTHER | Age: 41
End: 2021-01-19

## 2021-01-19 DIAGNOSIS — E04.9 GOITER: ICD-10-CM

## 2021-01-19 RX ORDER — LEVOTHYROXINE SODIUM 75 UG/1
75 TABLET ORAL DAILY
Qty: 90 TABLET | Refills: 0 | OUTPATIENT
Start: 2021-01-19

## 2021-01-19 NOTE — TELEPHONE ENCOUNTER
SYNTHROID      Last Written Prescription Date:  7-  Last Fill Quantity: 90,   # refills: 0  Last Office Visit: 12-1-2020  Future Office visit:       Routing refill request to provider for review/approval because:

## 2021-01-20 RX ORDER — LEVOTHYROXINE SODIUM 75 UG/1
75 TABLET ORAL DAILY
Qty: 90 TABLET | Refills: 3 | Status: SHIPPED | OUTPATIENT
Start: 2021-01-20 | End: 2022-02-17

## 2021-02-15 ENCOUNTER — OFFICE VISIT (OUTPATIENT)
Dept: OBGYN | Facility: OTHER | Age: 41
End: 2021-02-15
Attending: ADVANCED PRACTICE MIDWIFE
Payer: COMMERCIAL

## 2021-02-15 VITALS
HEART RATE: 73 BPM | SYSTOLIC BLOOD PRESSURE: 98 MMHG | BODY MASS INDEX: 28.56 KG/M2 | DIASTOLIC BLOOD PRESSURE: 62 MMHG | WEIGHT: 182 LBS | OXYGEN SATURATION: 98 % | HEIGHT: 67 IN | TEMPERATURE: 96.1 F

## 2021-02-15 DIAGNOSIS — Z12.4 ENCOUNTER FOR SCREENING FOR CERVICAL CANCER: ICD-10-CM

## 2021-02-15 DIAGNOSIS — Z12.31 ENCOUNTER FOR SCREENING MAMMOGRAM FOR BREAST CANCER: ICD-10-CM

## 2021-02-15 DIAGNOSIS — Z86.39 HISTORY OF HYPOTHYROIDISM: ICD-10-CM

## 2021-02-15 DIAGNOSIS — Z01.419 WELL WOMAN EXAM WITH ROUTINE GYNECOLOGICAL EXAM: Primary | ICD-10-CM

## 2021-02-15 LAB — TSH SERPL DL<=0.005 MIU/L-ACNC: 1.77 MU/L (ref 0.4–4)

## 2021-02-15 PROCEDURE — 36415 COLL VENOUS BLD VENIPUNCTURE: CPT | Performed by: ADVANCED PRACTICE MIDWIFE

## 2021-02-15 PROCEDURE — 90714 TD VACC NO PRESV 7 YRS+ IM: CPT | Performed by: ADVANCED PRACTICE MIDWIFE

## 2021-02-15 PROCEDURE — G0123 SCREEN CERV/VAG THIN LAYER: HCPCS | Performed by: ADVANCED PRACTICE MIDWIFE

## 2021-02-15 PROCEDURE — 87624 HPV HI-RISK TYP POOLED RSLT: CPT | Performed by: ADVANCED PRACTICE MIDWIFE

## 2021-02-15 PROCEDURE — 84443 ASSAY THYROID STIM HORMONE: CPT | Performed by: ADVANCED PRACTICE MIDWIFE

## 2021-02-15 PROCEDURE — 99396 PREV VISIT EST AGE 40-64: CPT | Mod: 25 | Performed by: ADVANCED PRACTICE MIDWIFE

## 2021-02-15 PROCEDURE — 90471 IMMUNIZATION ADMIN: CPT | Performed by: ADVANCED PRACTICE MIDWIFE

## 2021-02-15 ASSESSMENT — PAIN SCALES - GENERAL: PAINLEVEL: NO PAIN (0)

## 2021-02-15 ASSESSMENT — ANXIETY QUESTIONNAIRES
GAD7 TOTAL SCORE: 0
3. WORRYING TOO MUCH ABOUT DIFFERENT THINGS: NOT AT ALL
1. FEELING NERVOUS, ANXIOUS, OR ON EDGE: NOT AT ALL
4. TROUBLE RELAXING: NOT AT ALL
6. BECOMING EASILY ANNOYED OR IRRITABLE: NOT AT ALL
7. FEELING AFRAID AS IF SOMETHING AWFUL MIGHT HAPPEN: NOT AT ALL
2. NOT BEING ABLE TO STOP OR CONTROL WORRYING: NOT AT ALL
5. BEING SO RESTLESS THAT IT IS HARD TO SIT STILL: NOT AT ALL

## 2021-02-15 ASSESSMENT — MIFFLIN-ST. JEOR: SCORE: 1514.21

## 2021-02-15 ASSESSMENT — PATIENT HEALTH QUESTIONNAIRE - PHQ9: SUM OF ALL RESPONSES TO PHQ QUESTIONS 1-9: 1

## 2021-02-15 NOTE — PATIENT INSTRUCTIONS
Return to office in one year for well woman care and as needed.    Thank you for allowing Alejandro ORELLANA CNM and our OB team to participate in your care.  If you have a scheduling or an appointment question please contact [facility] Coordinator at their direct line [phone].   ALL nursing questions or concerns can be directed to your OB nurse at: [phone] Honorio Nix/Debra

## 2021-02-15 NOTE — PROGRESS NOTES
ANNUAL    Sheridan is a 40 year old  female who presents for annual exam.   Youngest child 10  Largest Child 8 lb 2 oz  GDMn   Hypertension end of the first  No LMP recorded. (Menstrual status: IUD).  Menses:  Cycles Amenorrhea with IUD When did they start13 How many days bleed 4-5 days x once in a year pain n Contraception Mirena IUD.  Planning pregnancy: n    Concerns in addition to routine health maintenance?  none.  GYNECOLOGIC HISTORY:   Surgery: LEEP, BTO  History sexually transmitted infections:No STD history  Safe?  y  STI testing offered?  y  History of abnormal Pap smear: y  Problems with sex? (bleeding/pain) n  Family history of: breast cancer: n   Uterine cancer: n ovarian cancer: n   colon cancer: n    HEALTH MAINTENANCE:  Cholesterol: (  Cholesterol   Date Value Ref Range Status   06/10/2019 163 <200 mg/dL Final   2018 144 <200 mg/dL Final    History of abnormal lipids: n  Mammo: y . History of abnormal Mammo: n   Regular Self Breast Exams: n Calcium/Vitamin D or Vitamin: n Exercise n    TSH: (  TSH   Date Value Ref Range Status   2020 4.99 (H) 0.40 - 4.00 mU/L Final    )  Pap; (  Lab Results   Component Value Date    PAP NIL 06/10/2019    PAP NIL 2018    PAP ASC-US 2017    )    HISTORY:  OB History    Para Term  AB Living   2 2 2 0 0 2   SAB TAB Ectopic Multiple Live Births   0 0 0 0 2      # Outcome Date GA Lbr Renato/2nd Weight Sex Delivery Anes PTL Lv   2 Term 2008 39w0d  3.685 kg (8 lb 2 oz) F Vag-Spont   [facility]      Name: Araceli   1 Term 2006 41w0d 08:00 3.459 kg (7 lb 10 oz) F Vag-Spont   [facility]ITA      Name: Bladimir     Past Medical History:   Diagnosis Date     Anxiety state, unspecified 3/12/2007     Breech presentation without mention of version, antepartum 1/10/2006     Galactorrhea not associated with childbirth 05/10/2005     Leukorrhea, not specified as infective 2005     Post term pregnancy, antepartum condition or complication 2006      Spotting complicating pregnancy, antepartum condition or complication 2008     Supervision of other normal pregnancy 2005     Threatened , antepartum 2007     Past Surgical History:   Procedure Laterality Date     ABDOMINOPLASTY       BUNIONECTOMY Right 10/29/2018    Procedure: RIGHT 1ST METATARSOPHALANGEAL JOINT CHEILECTOMY WITH IMPLANT;  Surgeon: Katty Schroeder DPM;  Location: [facility]     CHEILECTOMY Right 10/29/2018    Procedure: CHEILECTOMY;  Surgeon: Katty Schroeder DPM;  Location: [facility]     CONIZATION LEEP N/A 2016    Procedure: CONIZATION LEEP;  Surgeon: Chioma Wiggins MD;  Location: [facility]     LAPAROSCOPIC SALPINGECTOMY Bilateral 2017    Procedure: LAPAROSCOPIC SALPINGECTOMY;  LAPAROSCOPIC BILATERAL SALPINGECTOMY;  Surgeon: Kalin Hernandez MD;  Location: [facility]     MAMMOPLASTY AUGMENTATION BILATERAL       Family History   Problem Relation Age of Onset     Hypertension Father      Other - See Comments Father         lymphoma     Uterine Cancer Cousin      Social History     Socioeconomic History     Marital status:      Spouse name: None     Number of children: None     Years of education: None     Highest education level: None   Occupational History     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   Lifestyle     Physical activity     Days per week: None     Minutes per session: None     Stress: None   Relationships     Social connections     Talks on phone: None     Gets  together: None     Attends Hinduism service: None     Active member of club or organization: None     Attends meetings of clubs or organizations: None     Relationship status: None     Intimate partner violence     Fear of current or ex partner: None     Emotionally abused: None     Physically abused: None     Forced sexual activity: None   Other Topics Concern      Service Not Asked     Blood Transfusions Not Asked     Caffeine Concern Yes     Comment: coffee 3 cups     Occupational Exposure Not Asked     Hobby Hazards Not Asked     Sleep Concern Not Asked     Stress Concern Not Asked     Weight Concern Not Asked     Special Diet Not Asked     Back Care Not Asked     Exercise Not Asked     Bike Helmet Not Asked     Seat Belt Not Asked     Self-Exams Not Asked     Parent/sibling w/ CABG, MI or angioplasty before 65F 55M? No   Social History Narrative     None       Current Outpatient Medications:      ibuprofen (ADVIL/MOTRIN) 800 MG tablet, Take 1 tablet (800 mg) by mouth every 8 hours as needed for moderate pain, Disp: 40 tablet, Rfl: 1     levonorgestrel (MIRENA, 52 MG,) 20 MCG/24HR IUD, 1 each (20 mcg) by Intrauterine route continuous, Disp: 1 each, Rfl: 0     levothyroxine (SYNTHROID/LEVOTHROID) 75 MCG tablet, Take 1 tablet (75 mcg) by mouth daily, Disp: 90 tablet, Rfl: 3     ondansetron (ZOFRAN ODT) 4 MG ODT tab, Take 1-2 tablets (4-8 mg) by mouth every 8 hours as needed for nausea or vomiting (Patient not taking: Reported on 2/15/2021), Disp: 40 tablet, Rfl: 1     VITAMIN D, CHOLECALCIFEROL, PO, Take 6,000 Units by mouth daily, Disp: , Rfl:      Allergies   Allergen Reactions     Erythromycin Nausea and Vomiting     Erythromycin base     Penicillins Rash       Past medical, surgical, social and family history were reviewed and updated in [facility].    ROS:    CONSTITUTIONAL:     NEGATIVE for fever, chills. Change in weight approx 10 lb  INTEGUMENTARY/SKIN:       NEGATIVE for worrisome rashes, moles or  "lesions  EYES:     NEGATIVE for vision changes or irritation  ENT/MOUTH: NEGATIVE for ear, mouth and throat problems  RESP:     NEGATIVE for significant cough or SOB  CV:   NEGATIVE for chest pain, palpitations or peripheral edema  GI:     NEGATIVE for nausea, abdominal pain, heartburn, or change in bowel habits  :   NEGATIVE for frequency, dysuria, hematuria, vaginal discharge, or irregular bleeding,incontinence   MUSCULOSKELETAL:     NEGATIVE for significant arthralgias or myalgia  NEURO:      NEGATIVE for weakness, dizziness or paresthesias  ENDOCRINE:      NEGATIVE for temperature intolerance, skin/hair changes  PSYCHIATRIC:      NEGATIVE for changes in mood or affect.     EXAM:   BP 98/62   Pulse 73   Temp 96.1  F (35.6  C)   Ht 1.695 m (5' 6.75\")   Wt 82.6 kg (182 lb)   SpO2 98%   BMI 28.72 kg/m     BMI: Body mass index is 28.72 kg/m .  Constitutional: healthy, alert and no distress  Head: Normocephalic. No masses, lesions, tenderness or abnormalities  Neck: Neck supple. Trachea midline. No adenopathy. Thyroid symmetric, normal size.   Cardiovascular: RRR.   Respiratory: lungs clear   Breast: Breasts reveal mild symmetric fibrocystic densities, but there are no dominant, discrete, fixed or suspicious masses found.  Gastrointestinal: Abdomen soft, non-tender, non-distended. No masses, organomegaly.  :  Vulva:  No external lesions, normal female hair distribution, no inguinal adenopathy.    Urethra:  Midline, non-tender, well supported, no discharge  Vagina:  Moist, pink, no abnormal discharge, no lesions  Cervix: clean IUD string visible  Uterus:  Normal size, non-tender, freely mobile  Ovaries:  No masses appreciated  Rectal Exam: deferr  Musculoskeletal: extremities normal  Skin: no suspicious lesions or rashes  Psychiatric: Affect appropriate, cooperative,mentation appears normal.     COUNSELING:   regular exercise  healthy diet/nutrition  Immunizations   contraception  family planning  Folic Acid " Counseling     reports that she quit smoking about 19 years ago. She has never used smokeless tobacco.  Tobacco Cessation Action Plan: na  Body mass index is 28.72 kg/m .  Weight management plan: Healthy eating and exercise  FRAX Risk Assessment    ASSESSMENT:  40 year old female with satisfactory annual exam  Need of cervical cancer screening   IUD strings visualized  Mammogram due in March, 2021  Need of Tdap  Hx of hypothyroidism   Flu shot done  PLAN:   Pap with High Risk hpv  Tsh  IUD check  mammogram due in 3/2021    Return to office: 1 year for well woman care and as needed    25 minutes spent on the date of the encounter doing chart review, history and exam, documentation and further activities as noted above     REYNA Koch, CNM

## 2021-02-18 LAB
COPATH REPORT: NORMAL
PAP: NORMAL

## 2021-03-29 ENCOUNTER — TELEPHONE (OUTPATIENT)
Dept: OBGYN | Facility: OTHER | Age: 41
End: 2021-03-29

## 2021-03-29 ENCOUNTER — MYC MEDICAL ADVICE (OUTPATIENT)
Dept: OBGYN | Facility: OTHER | Age: 41
End: 2021-03-29

## 2021-03-29 DIAGNOSIS — M79.622 PAIN IN LEFT AXILLA: Primary | ICD-10-CM

## 2021-03-29 DIAGNOSIS — Z12.31 ENCOUNTER FOR SCREENING MAMMOGRAM FOR BREAST CANCER: ICD-10-CM

## 2021-04-09 ENCOUNTER — HOSPITAL ENCOUNTER (OUTPATIENT)
Dept: MAMMOGRAPHY | Facility: OTHER | Age: 41
End: 2021-04-09
Attending: ADVANCED PRACTICE MIDWIFE
Payer: COMMERCIAL

## 2021-04-09 ENCOUNTER — HOSPITAL ENCOUNTER (OUTPATIENT)
Dept: ULTRASOUND IMAGING | Facility: HOSPITAL | Age: 41
End: 2021-04-09
Attending: ADVANCED PRACTICE MIDWIFE
Payer: COMMERCIAL

## 2021-04-09 DIAGNOSIS — M79.622 PAIN IN LEFT AXILLA: ICD-10-CM

## 2021-04-09 DIAGNOSIS — Z12.31 ENCOUNTER FOR SCREENING MAMMOGRAM FOR BREAST CANCER: ICD-10-CM

## 2021-04-09 PROCEDURE — 76882 US LMTD JT/FCL EVL NVASC XTR: CPT | Mod: 50

## 2021-04-09 PROCEDURE — 77066 DX MAMMO INCL CAD BI: CPT | Mod: TC | Performed by: RADIOLOGY

## 2021-04-09 PROCEDURE — G0279 TOMOSYNTHESIS, MAMMO: HCPCS | Mod: TC | Performed by: RADIOLOGY

## 2021-10-03 ENCOUNTER — HEALTH MAINTENANCE LETTER (OUTPATIENT)
Age: 41
End: 2021-10-03

## 2021-10-13 ENCOUNTER — APPOINTMENT (OUTPATIENT)
Dept: OCCUPATIONAL MEDICINE | Facility: OTHER | Age: 41
End: 2021-10-13

## 2021-10-13 PROCEDURE — 86580 TB INTRADERMAL TEST: CPT

## 2021-10-26 ENCOUNTER — APPOINTMENT (OUTPATIENT)
Dept: OCCUPATIONAL MEDICINE | Facility: OTHER | Age: 41
End: 2021-10-26

## 2021-10-26 PROCEDURE — 86580 TB INTRADERMAL TEST: CPT

## 2021-11-30 ENCOUNTER — TELEPHONE (OUTPATIENT)
Dept: FAMILY MEDICINE | Facility: OTHER | Age: 41
End: 2021-11-30
Payer: COMMERCIAL

## 2021-11-30 ENCOUNTER — OFFICE VISIT (OUTPATIENT)
Dept: FAMILY MEDICINE | Facility: OTHER | Age: 41
End: 2021-11-30
Attending: NURSE PRACTITIONER
Payer: COMMERCIAL

## 2021-11-30 ENCOUNTER — ANCILLARY PROCEDURE (OUTPATIENT)
Dept: GENERAL RADIOLOGY | Facility: OTHER | Age: 41
End: 2021-11-30
Attending: NURSE PRACTITIONER
Payer: COMMERCIAL

## 2021-11-30 ENCOUNTER — NURSE TRIAGE (OUTPATIENT)
Dept: FAMILY MEDICINE | Facility: OTHER | Age: 41
End: 2021-11-30
Payer: COMMERCIAL

## 2021-11-30 VITALS
SYSTOLIC BLOOD PRESSURE: 138 MMHG | WEIGHT: 188 LBS | OXYGEN SATURATION: 100 % | HEIGHT: 67 IN | TEMPERATURE: 99 F | RESPIRATION RATE: 16 BRPM | DIASTOLIC BLOOD PRESSURE: 88 MMHG | BODY MASS INDEX: 29.51 KG/M2 | HEART RATE: 97 BPM

## 2021-11-30 DIAGNOSIS — R10.11 RIGHT UPPER QUADRANT PAIN: ICD-10-CM

## 2021-11-30 DIAGNOSIS — R10.11 RIGHT UPPER QUADRANT PAIN: Primary | ICD-10-CM

## 2021-11-30 LAB
ALBUMIN SERPL-MCNC: 4.2 G/DL (ref 3.4–5)
ALBUMIN UR-MCNC: NEGATIVE MG/DL
ALP SERPL-CCNC: 53 U/L (ref 40–150)
ALT SERPL W P-5'-P-CCNC: 27 U/L (ref 0–50)
ANION GAP SERPL CALCULATED.3IONS-SCNC: 6 MMOL/L (ref 3–14)
APPEARANCE UR: CLEAR
AST SERPL W P-5'-P-CCNC: 13 U/L (ref 0–45)
BASOPHILS # BLD AUTO: 0 10E3/UL (ref 0–0.2)
BASOPHILS NFR BLD AUTO: 1 %
BILIRUB SERPL-MCNC: 0.7 MG/DL (ref 0.2–1.3)
BILIRUB UR QL STRIP: NEGATIVE
BUN SERPL-MCNC: 12 MG/DL (ref 7–30)
CALCIUM SERPL-MCNC: 9.1 MG/DL (ref 8.5–10.1)
CHLORIDE BLD-SCNC: 106 MMOL/L (ref 94–109)
CO2 SERPL-SCNC: 27 MMOL/L (ref 20–32)
COLOR UR AUTO: ABNORMAL
CREAT SERPL-MCNC: 0.8 MG/DL (ref 0.52–1.04)
EOSINOPHIL # BLD AUTO: 0 10E3/UL (ref 0–0.7)
EOSINOPHIL NFR BLD AUTO: 0 %
ERYTHROCYTE [DISTWIDTH] IN BLOOD BY AUTOMATED COUNT: 12.5 % (ref 10–15)
GFR SERPL CREATININE-BSD FRML MDRD: >90 ML/MIN/1.73M2
GLUCOSE BLD-MCNC: 90 MG/DL (ref 70–99)
GLUCOSE UR STRIP-MCNC: NEGATIVE MG/DL
HCT VFR BLD AUTO: 43.8 % (ref 35–47)
HGB BLD-MCNC: 14.8 G/DL (ref 11.7–15.7)
HGB UR QL STRIP: NEGATIVE
IMM GRANULOCYTES # BLD: 0 10E3/UL
IMM GRANULOCYTES NFR BLD: 0 %
KETONES UR STRIP-MCNC: 10 MG/DL
LEUKOCYTE ESTERASE UR QL STRIP: NEGATIVE
LYMPHOCYTES # BLD AUTO: 2.2 10E3/UL (ref 0.8–5.3)
LYMPHOCYTES NFR BLD AUTO: 32 %
MCH RBC QN AUTO: 29 PG (ref 26.5–33)
MCHC RBC AUTO-ENTMCNC: 33.8 G/DL (ref 31.5–36.5)
MCV RBC AUTO: 86 FL (ref 78–100)
MONOCYTES # BLD AUTO: 0.4 10E3/UL (ref 0–1.3)
MONOCYTES NFR BLD AUTO: 5 %
NEUTROPHILS # BLD AUTO: 4.2 10E3/UL (ref 1.6–8.3)
NEUTROPHILS NFR BLD AUTO: 62 %
NITRATE UR QL: NEGATIVE
NRBC # BLD AUTO: 0 10E3/UL
NRBC BLD AUTO-RTO: 0 /100
PH UR STRIP: 6.5 [PH] (ref 4.7–8)
PLATELET # BLD AUTO: 202 10E3/UL (ref 150–450)
POTASSIUM BLD-SCNC: 3.6 MMOL/L (ref 3.4–5.3)
PROT SERPL-MCNC: 7.5 G/DL (ref 6.8–8.8)
RBC # BLD AUTO: 5.1 10E6/UL (ref 3.8–5.2)
SODIUM SERPL-SCNC: 139 MMOL/L (ref 133–144)
SP GR UR STRIP: 1 (ref 1–1.03)
UROBILINOGEN UR STRIP-MCNC: NORMAL MG/DL
WBC # BLD AUTO: 6.8 10E3/UL (ref 4–11)

## 2021-11-30 PROCEDURE — 99214 OFFICE O/P EST MOD 30 MIN: CPT | Performed by: NURSE PRACTITIONER

## 2021-11-30 PROCEDURE — 74019 RADEX ABDOMEN 2 VIEWS: CPT | Mod: TC | Performed by: RADIOLOGY

## 2021-11-30 PROCEDURE — 81003 URINALYSIS AUTO W/O SCOPE: CPT | Performed by: NURSE PRACTITIONER

## 2021-11-30 PROCEDURE — 85025 COMPLETE CBC W/AUTO DIFF WBC: CPT | Performed by: NURSE PRACTITIONER

## 2021-11-30 PROCEDURE — 36415 COLL VENOUS BLD VENIPUNCTURE: CPT | Performed by: NURSE PRACTITIONER

## 2021-11-30 PROCEDURE — 80053 COMPREHEN METABOLIC PANEL: CPT | Performed by: NURSE PRACTITIONER

## 2021-11-30 RX ORDER — ACYCLOVIR 400 MG/1
TABLET ORAL
COMMUNITY
Start: 2021-05-11 | End: 2022-02-17

## 2021-11-30 ASSESSMENT — ENCOUNTER SYMPTOMS
DIARRHEA: 1
VOMITING: 0
ARTHRALGIAS: 0
SHORTNESS OF BREATH: 0
MYALGIAS: 0
PSYCHIATRIC NEGATIVE: 1
DIZZINESS: 0
CONSTIPATION: 0
COUGH: 0
FEVER: 1
ABDOMINAL PAIN: 1
NAUSEA: 1
HEMATOCHEZIA: 0

## 2021-11-30 ASSESSMENT — MIFFLIN-ST. JEOR: SCORE: 1536.42

## 2021-11-30 NOTE — PROGRESS NOTES
"        Jazmyne Swartz is a 41 year old who presents for the following health issues   RUQ pain, ate pizza last night and  Had pain/fullness bloating.        HPI       ABDOMINAL PAIN (and/or Flank Pain)    Onset: 4 days     Description:   Location: right side pain   Radiation: None and Back  Character: pressure     Frequency (if intermittent):7 day(s)        Pain-free between episodes: YES    History:    Previous similar pain? no   Previous tests done? none  Any related trauma?: no    Accompanying Signs & Symptoms:   Fever? no  Nausea YES  Vomiting (bloody?, coffee-grounds?) no  Dysuria? no  Hematuria: no  Change in stool color: lighter and softer   Change in stool pattern: YES- diarrhea off and on for a couple weeks   Weight loss: no    Precipitating and/or Alleviating factors:   Does the pain change with: Food YES- worse                                                 BM no                                                Body movement YES- when sitting and stands up seems to be better                                                 Urination no  Possibility of Pregnancy: no ; No LMP recorded. (Menstrual status: IUD).    Therapies tried and outcome: none                Review of Systems   Constitutional: Positive for fever.   Respiratory: Negative for cough and shortness of breath.         Covid a month ago   Gastrointestinal: Positive for abdominal pain, diarrhea and nausea. Negative for constipation, hematochezia and vomiting.        Feels bloated to Right upper quadrant.  Tightness  Like discomfort  Worse after eats.     Musculoskeletal: Negative for arthralgias and myalgias.   Skin: Negative for rash.   Neurological: Negative for dizziness.   Psychiatric/Behavioral: Negative.             Objective    /88 (BP Location: Left arm, Patient Position: Chair, Cuff Size: Adult Large)   Pulse 97   Temp 99  F (37.2  C) (Tympanic)   Resp 16   Ht 1.695 m (5' 6.75\")   Wt 85.3 kg (188 lb)   SpO2 100%   " BMI 29.67 kg/m    Body mass index is 29.67 kg/m .  Physical Exam  Constitutional:       Appearance: Normal appearance.   Cardiovascular:      Rate and Rhythm: Normal rate and regular rhythm.      Heart sounds: Normal heart sounds. No murmur heard.      Pulmonary:      Effort: Pulmonary effort is normal.      Breath sounds: Normal breath sounds.   Abdominal:      General: Abdomen is flat. There is no distension.      Palpations: Abdomen is soft. There is no mass.      Tenderness: There is abdominal tenderness. There is no right CVA tenderness, left CVA tenderness, guarding or rebound.   Musculoskeletal:         General: Normal range of motion.      Cervical back: Normal range of motion and neck supple.   Lymphadenopathy:      Cervical: No cervical adenopathy.   Skin:     General: Skin is warm and dry.      Findings: No rash.   Neurological:      General: No focal deficit present.      Mental Status: She is alert and oriented to person, place, and time.   Psychiatric:         Mood and Affect: Mood normal.         Behavior: Behavior normal.        Results for orders placed or performed in visit on 11/30/21   XR Abdomen 2 Views     Status: None    Narrative    PROCEDURE: XR ABDOMEN 2VIEWS 11/30/2021 5:00 PM    HISTORY: Right upper quadrant pain    COMPARISONS: None.    TECHNIQUE: Flat and upright    FINDINGS: The intestinal gas pattern is normal. There is no  extraluminal gas or pathologic intra-abdominal calcifications. An IUD  is seen centrally in the pelvis.         Impression    IMPRESSION: Normal abdominal gas pattern    SUNI WANG MD         SYSTEM ID:  [id number]   Results for orders placed or performed in visit on 11/30/21   UA reflex to Microscopic and Culture - [place]     Status: Abnormal    Specimen: Urine, Midstream   Result Value Ref Range    Color Urine Straw Colorless, Straw, Light Yellow, Yellow    Appearance Urine Clear Clear    Glucose Urine Negative Negative mg/dL    Bilirubin Urine Negative  Negative    Ketones Urine 10  (A) Negative mg/dL    Specific Gravity Urine 1.005 1.003 - 1.035    Blood Urine Negative Negative    pH Urine 6.5 4.7 - 8.0    Protein Albumin Urine Negative Negative mg/dL    Urobilinogen Urine Normal Normal, 2.0 mg/dL    Nitrite Urine Negative Negative    Leukocyte Esterase Urine Negative Negative    Narrative    Microscopic not indicated   Comprehensive metabolic panel (BMP + Alb, Alk Phos, ALT, AST, Total. Bili, TP)     Status: Normal   Result Value Ref Range    Sodium 139 133 - 144 mmol/L    Potassium 3.6 3.4 - 5.3 mmol/L    Chloride 106 94 - 109 mmol/L    Carbon Dioxide (CO2) 27 20 - 32 mmol/L    Anion Gap 6 3 - 14 mmol/L    Urea Nitrogen 12 7 - 30 mg/dL    Creatinine 0.80 0.52 - 1.04 mg/dL    Calcium 9.1 8.5 - 10.1 mg/dL    Glucose 90 70 - 99 mg/dL    Alkaline Phosphatase 53 40 - 150 U/L    AST 13 0 - 45 U/L    ALT 27 0 - 50 U/L    Protein Total 7.5 6.8 - 8.8 g/dL    Albumin 4.2 3.4 - 5.0 g/dL    Bilirubin Total 0.7 0.2 - 1.3 mg/dL    GFR Estimate >90 >60 mL/min/1.73m2   CBC with platelets and differential     Status: None   Result Value Ref Range    WBC Count 6.8 4.0 - 11.0 10e3/uL    RBC Count 5.10 3.80 - 5.20 10e6/uL    Hemoglobin 14.8 11.7 - 15.7 g/dL    Hematocrit 43.8 35.0 - 47.0 %    MCV 86 78 - 100 fL    MCH 29.0 26.5 - 33.0 pg    MCHC 33.8 31.5 - 36.5 g/dL    RDW 12.5 10.0 - 15.0 %    Platelet Count 202 150 - 450 10e3/uL    % Neutrophils 62 %    % Lymphocytes 32 %    % Monocytes 5 %    % Eosinophils 0 %    % Basophils 1 %    % Immature Granulocytes 0 %    NRBCs per 100 WBC 0 <1 /100    Absolute Neutrophils 4.2 1.6 - 8.3 10e3/uL    Absolute Lymphocytes 2.2 0.8 - 5.3 10e3/uL    Absolute Monocytes 0.4 0.0 - 1.3 10e3/uL    Absolute Eosinophils 0.0 0.0 - 0.7 10e3/uL    Absolute Basophils 0.0 0.0 - 0.2 10e3/uL    Absolute Immature Granulocytes 0.0 <=0.4 10e3/uL    Absolute NRBCs 0.0 10e3/uL   CBC with platelets and differential     Status: None    Narrative    The following  orders were created for panel order CBC with platelets and differential.  Procedure                               Abnormality         Status                     ---------                               -----------         ------                     CBC with platelets and d...[[id number]]                      Final result                 Please view results for these tests on the individual orders.           ASSESSMENT / PLAN:  (R10.11) Right upper quadrant pain  (primary encounter diagnosis)  Comment:   Plan:  1. MOM  1 oz today  2. No fat in diet til we do CT .   3. Don't worry!  4. CT of abdomen.     Comprehensive metabolic panel (BMP + Alb, Alk         Phos, ALT, AST, Total. Bili, TP), CBC with         platelets and differential, UA reflex to         Microscopic and Culture - [place], XR Abdomen 2        Views, CT Abdomen w Contrast

## 2021-11-30 NOTE — NURSING NOTE
"Chief Complaint   Patient presents with     Nausea       Initial /88 (BP Location: Left arm, Patient Position: Chair, Cuff Size: Adult Large)   Pulse 97   Temp 99  F (37.2  C) (Tympanic)   Resp 16   Ht 1.695 m (5' 6.75\")   Wt 85.3 kg (188 lb)   SpO2 100%   BMI 29.67 kg/m   Estimated body mass index is 29.67 kg/m  as calculated from the following:    Height as of this encounter: 1.695 m (5' 6.75\").    Weight as of this encounter: 85.3 kg (188 lb).  Medication Reconciliation: complete  Pamela M. Lechevalier, LPN    "

## 2021-11-30 NOTE — TELEPHONE ENCOUNTER
I called and left message that she is already seeing Orion Gallagher today. Does not need to call us back.

## 2021-11-30 NOTE — TELEPHONE ENCOUNTER
11:53 AM    Reason for Call: OVERBOOK      This message came into MyChart requests. No appointments available in timeframe requested. Please advise pt    Message    Appointment Request From: Sheridan Roldan      With Provider: SAARH Brennan [[facility]]      Preferred Date Range: 12/2/2021 - 12/3/2021      Preferred Times: Wednesday Afternoon, Thursday Afternoon, Friday Morning, Friday Afternoon      Reason for visit: Request an Appointment      Comments:   R sided pain in rib

## 2021-11-30 NOTE — TELEPHONE ENCOUNTER
Covid positive x 1 month ago. Patient reports fullness in right side of abdomen after eating on 11/29/21 with nausea. Patient states the abdominal fullness has been off and on x 1 week.     Denies fever, denies abdominal pain.     Next 5 appointments (look out 90 days)    Nov 30, 2021  3:30 PM  (Arrive by 3:15 PM)  SHORT with Orion Gallagher NP  [facility] [place] ([facility] [place] ) [address]  [place] [address]  [phone]   Feb 16, 2022 10:30 AM  (Arrive by 10:15 AM)  PHYSICAL with REYNA Ayala CNM  [facility] [place] ([facility] [place] ) [address]  [place] [address]  [phone]          Reason for Disposition    Patient wants to be seen    Additional Information    Negative: Shock suspected (e.g., cold/pale/clammy skin, too weak to stand, low BP, rapid pulse)    Negative: Sounds like a life-threatening emergency to the triager    Negative: Nausea or vomiting and pregnancy < 20 weeks    Negative: Menstrual Period - Missed or Late (i.e., pregnancy suspected)    Negative: Heat exhaustion suspected (i.e., dehydration from heat exposure)    Negative: Anxiety or stress suspected (i.e., nausea with anxiety attacks or stressful situations)    Negative: Traumatic Brain Injury (TBI) suspected    Negative: Vomiting occurs    Negative: Other symptom is present, see that guideline.  (e.g., chest pain, headache, dizziness, abdominal pain, colds, sore throat, etc.).    Negative: Unable to walk, or can only walk with assistance (e.g., requires support)    Negative: Difficulty breathing    Negative: Insulin-dependent diabetes (Type I) and glucose > 400 mg/dL (22 mmol/L)    Negative: Drinking very little and dehydration suspected (e.g., no urine > 12 hours, very dry mouth, very lightheaded)    Negative: Patient sounds very sick or weak to the triager    Negative: Fever > 104 F (40 C)    Negative: Fever > 101 F (38.3 C) and age > 60    Negative:  "Fever > 100.0 F (37.8 C) and bedridden (e.g., nursing home patient, CVA, chronic illness, recovering from surgery)    Negative: Fever > 100.0 F (37.8 C) and diabetes mellitus or weak immune system (e.g., HIV positive, cancer chemo, splenectomy, chronic steroids)    Negative: Taking any of the following medications: digoxin (Lanoxin), lithium, theophylline, phenytoin (Dilantin)    Negative: Yellowish color of the skin or white of the eye (i.e., jaundice)    Negative: Fever present > 3 days (72 hours)    Answer Assessment - Initial Assessment Questions  1. NAUSEA SEVERITY: \"How bad is the nausea?\" (e.g., mild, moderate, severe; dehydration, weight loss)    - MILD: loss of appetite without change in eating habits    - MODERATE: decreased oral intake without significant weight loss, dehydration, or malnutrition    - SEVERE: inadequate caloric or fluid intake, significant weight loss, symptoms of dehydration      Mild- moderate     2. ONSET: \"When did the nausea begin?\"      11/29/21    3. VOMITING: \"Any vomiting?\" If so, ask: \"How many times today?\"      No     4. RECURRENT SYMPTOM: \"Have you had nausea before?\" If so, ask: \"When was the last time?\" \"What happened that time?\"      No     5. CAUSE: \"What do you think is causing the nausea?\"      Unknown    6. PREGNANCY: \"Is there any chance you are pregnant?\" (e.g., unprotected intercourse, missed birth control pill, broken condom)      No    Protocols used: NAUSEA-A-OH      "

## 2021-12-03 ENCOUNTER — OFFICE VISIT (OUTPATIENT)
Dept: SURGERY | Facility: OTHER | Age: 41
End: 2021-12-03
Attending: CLINICAL NURSE SPECIALIST
Payer: COMMERCIAL

## 2021-12-03 ENCOUNTER — HOSPITAL ENCOUNTER (OUTPATIENT)
Dept: CT IMAGING | Facility: HOSPITAL | Age: 41
End: 2021-12-03
Attending: NURSE PRACTITIONER
Payer: COMMERCIAL

## 2021-12-03 VITALS
HEART RATE: 98 BPM | HEIGHT: 67 IN | BODY MASS INDEX: 29.74 KG/M2 | DIASTOLIC BLOOD PRESSURE: 88 MMHG | WEIGHT: 189.5 LBS | SYSTOLIC BLOOD PRESSURE: 132 MMHG | TEMPERATURE: 98.4 F | OXYGEN SATURATION: 99 %

## 2021-12-03 DIAGNOSIS — R10.11 RUQ ABDOMINAL PAIN: Primary | ICD-10-CM

## 2021-12-03 DIAGNOSIS — R10.11 RIGHT UPPER QUADRANT PAIN: ICD-10-CM

## 2021-12-03 PROCEDURE — 250N000011 HC RX IP 250 OP 636: Performed by: RADIOLOGY

## 2021-12-03 PROCEDURE — 74160 CT ABDOMEN W/CONTRAST: CPT

## 2021-12-03 PROCEDURE — 99203 OFFICE O/P NEW LOW 30 MIN: CPT | Performed by: SURGERY

## 2021-12-03 RX ORDER — IOPAMIDOL 755 MG/ML
92 INJECTION, SOLUTION INTRAVASCULAR ONCE
Status: COMPLETED | OUTPATIENT
Start: 2021-12-03 | End: 2021-12-03

## 2021-12-03 RX ADMIN — IOPAMIDOL 92 ML: 755 INJECTION, SOLUTION INTRAVENOUS at 08:43

## 2021-12-03 ASSESSMENT — MIFFLIN-ST. JEOR: SCORE: 1543.23

## 2021-12-03 ASSESSMENT — PAIN SCALES - GENERAL: PAINLEVEL: NO PAIN (0)

## 2021-12-03 NOTE — PATIENT INSTRUCTIONS
Thank you for allowing Dr. Morgan and our surgical team to participate in your care. Please call our health unit coordinator at [phone] with scheduling questions or the nurse at [phone] with any other questions or concerns.

## 2021-12-03 NOTE — Clinical Note
Unfortunately was noted to have a lung nodule on CT scan of abdomen, not sure the guidelines for which of these get followed. Her ultrasound was not concerning, will get HIDA scan of her gallbladder, and discussed repeating her liver ultrasound in 6 months, but I believe these liver things to be nothing. Patient just very anxious about them.

## 2021-12-03 NOTE — NURSING NOTE
"Chief Complaint   Patient presents with     Results     CT results       Initial /88   Pulse 98   Temp 98.4  F (36.9  C)   Ht 1.695 m (5' 6.75\")   Wt 86 kg (189 lb 8 oz)   SpO2 99%   BMI 29.90 kg/m   Estimated body mass index is 29.9 kg/m  as calculated from the following:    Height as of this encounter: 1.695 m (5' 6.75\").    Weight as of this encounter: 86 kg (189 lb 8 oz).  Medication Reconciliation: complete  Kecia Olson LPN    "

## 2021-12-07 ENCOUNTER — HOSPITAL ENCOUNTER (OUTPATIENT)
Dept: ULTRASOUND IMAGING | Facility: HOSPITAL | Age: 41
Discharge: HOME | End: 2021-12-07
Attending: SURGERY | Admitting: SURGERY
Payer: COMMERCIAL

## 2021-12-07 DIAGNOSIS — R10.11 RUQ ABDOMINAL PAIN: ICD-10-CM

## 2021-12-07 PROCEDURE — 76705 ECHO EXAM OF ABDOMEN: CPT

## 2021-12-13 ENCOUNTER — TELEPHONE (OUTPATIENT)
Dept: OBGYN | Facility: OTHER | Age: 41
End: 2021-12-13
Payer: COMMERCIAL

## 2021-12-13 NOTE — PROGRESS NOTES
[facility] Surgery Consultation    CC:  RUQ bloating, pain     HPI:  This 41 year old year old female is seen at the request of Orion Gallagher for evaluation of RUQ pain and bloating. She notes that she had an episode of pain and bloating over a week ago after eating some pizza. This prompted her to go to urgent care/clinic. During that visit a CT scan of abdomen was ordered. She is here to discuss results. She is not currently in any pain. This is not a recurring thing for her. She has noted increased bloating and looser stools. She has had a tubal ligation in the past.     Past Medical History:   Diagnosis Date     Anxiety state, unspecified 3/12/2007     Breech presentation without mention of version, antepartum 1/10/2006     Galactorrhea not associated with childbirth 05/10/2005     Leukorrhea, not specified as infective 2005     Post term pregnancy, antepartum condition or complication 2006     Spotting complicating pregnancy, antepartum condition or complication 2008     Supervision of other normal pregnancy 2005     Threatened , antepartum 2007       Past Surgical History:   Procedure Laterality Date     ABDOMINOPLASTY       BUNIONECTOMY Right 10/29/2018    Procedure: RIGHT 1ST METATARSOPHALANGEAL JOINT CHEILECTOMY WITH IMPLANT;  Surgeon: Katty Schroeder DPM;  Location: [facility]     CHEILECTOMY Right 10/29/2018    Procedure: CHEILECTOMY;  Surgeon: Katty Schroeder DPM;  [facility]: [facility]     CONIZATION LEEP N/A 2016    Procedure: CONIZATION LEEP;  Surgeon: Chioma Wiggins MD;  Location: [facility]     LAPAROSCOPIC SALPINGECTOMY Bilateral 2017    Procedure: LAPAROSCOPIC SALPINGECTOMY;  LAPAROSCOPIC BILATERAL SALPINGECTOMY;  Surgeon: Kalin Hernandez MD;  Location: [facility]     MAMMOPLASTY AUGMENTATION BILATERAL         Allergies   Allergen Reactions     Erythromycin Nausea and Vomiting     Erythromycin base     Penicillins Rash       Current Outpatient Medications  "  Medication     levonorgestrel (MIRENA, 52 MG,) 20 MCG/24HR IUD     levothyroxine (SYNTHROID/LEVOTHROID) 75 MCG tablet     acyclovir (ZOVIRAX) 400 MG tablet     ibuprofen (ADVIL/MOTRIN) 800 MG tablet     ondansetron (ZOFRAN ODT) 4 MG ODT tab     VITAMIN D, CHOLECALCIFEROL, PO     No current facility-administered medications for this visit.       HABITS:    Social History     Tobacco Use     Smoking status: Former Smoker     Quit date: 2002     Years since quittin.9     Smokeless tobacco: Never Used   Substance Use Topics     Alcohol use: Yes     Comment: socially     Drug use: No       Family History   Problem Relation Age of Onset     Hypertension Father      Other - See Comments Father         lymphoma     Uterine Cancer Cousin        REVIEW OF SYSTEMS:  Ten point review of systems negative except those mentioned in the HPI.     OBJECTIVE:    /88   Pulse 98   Temp 98.4  F (36.9  C)   Ht 1.695 m (5' 6.75\")   Wt 86 kg (189 lb 8 oz)   SpO2 99%   BMI 29.90 kg/m      GENERAL: Generally appears well, in no distress with appropriate affect.  HEENT:   Sclerae anicteric - normocephalic atraumatic   Respiratory:  No acute distress, no splinting   Cardiovascular:  Regular Rate and Rhythm  Abdomen: soft non-tender non-distended.   :  deferred  Extremities:  Extremities normal. No deformities, edema, or skin discoloration.  Skin:  no suspicious lesions or rashes  Neurological: grossly intact  Psych:  Alert, oriented, affect appropriate with normal decision making ability.    IMPRESSION:    41 y.o. female with RUQ fullness, mild pain after eating. CT scan of abdomen was obtained as part of workup which shows some low density lesions in liver. Discussed with her this is very common, I did discuss the best test to look for gallstones is ultrasound and at the same time we can look at the liver cysts. She does have quite a bit of anxiety about this results but tried to reemphasize how common it is to have " nodules/cysts in liver and that almost all of them are benign. Possible that symptoms are related to gastritis.      PLAN:    Will plan on Ultrasound of RUQ, follow up pending results.     Evelio Morgan MD,     12/13/2021  8:46 AM

## 2022-01-05 ENCOUNTER — ALLIED HEALTH/NURSE VISIT (OUTPATIENT)
Dept: PEDIATRICS | Facility: OTHER | Age: 42
End: 2022-01-05
Attending: PHYSICIAN ASSISTANT
Payer: COMMERCIAL

## 2022-01-05 DIAGNOSIS — Z23 NEED FOR PROPHYLACTIC VACCINATION AND INOCULATION AGAINST INFLUENZA: Primary | ICD-10-CM

## 2022-01-05 PROCEDURE — 90471 IMMUNIZATION ADMIN: CPT

## 2022-01-05 PROCEDURE — 90686 IIV4 VACC NO PRSV 0.5 ML IM: CPT

## 2022-02-17 ENCOUNTER — OFFICE VISIT (OUTPATIENT)
Dept: OBGYN | Facility: OTHER | Age: 42
End: 2022-02-17
Attending: NURSE PRACTITIONER
Payer: COMMERCIAL

## 2022-02-17 VITALS
BODY MASS INDEX: 29.51 KG/M2 | HEIGHT: 67 IN | HEART RATE: 85 BPM | SYSTOLIC BLOOD PRESSURE: 112 MMHG | OXYGEN SATURATION: 99 % | WEIGHT: 188 LBS | DIASTOLIC BLOOD PRESSURE: 66 MMHG

## 2022-02-17 DIAGNOSIS — Z12.4 SCREENING FOR CERVICAL CANCER: Primary | ICD-10-CM

## 2022-02-17 DIAGNOSIS — Z23 IMMUNIZATION DUE: ICD-10-CM

## 2022-02-17 DIAGNOSIS — A60.04 HERPES SIMPLEX VULVOVAGINITIS: ICD-10-CM

## 2022-02-17 DIAGNOSIS — Z01.419 WELL WOMAN EXAM WITH ROUTINE GYNECOLOGICAL EXAM: ICD-10-CM

## 2022-02-17 DIAGNOSIS — E04.9 GOITER: ICD-10-CM

## 2022-02-17 PROCEDURE — G0145 SCR C/V CYTO,THINLAYER,RESCR: HCPCS | Performed by: NURSE PRACTITIONER

## 2022-02-17 PROCEDURE — 90632 HEPA VACCINE ADULT IM: CPT | Performed by: NURSE PRACTITIONER

## 2022-02-17 PROCEDURE — 99396 PREV VISIT EST AGE 40-64: CPT | Mod: 25 | Performed by: NURSE PRACTITIONER

## 2022-02-17 PROCEDURE — 87624 HPV HI-RISK TYP POOLED RSLT: CPT | Performed by: NURSE PRACTITIONER

## 2022-02-17 PROCEDURE — 90471 IMMUNIZATION ADMIN: CPT | Performed by: NURSE PRACTITIONER

## 2022-02-17 RX ORDER — LEVOTHYROXINE SODIUM 75 UG/1
75 TABLET ORAL DAILY
Qty: 90 TABLET | Refills: 3 | Status: SHIPPED | OUTPATIENT
Start: 2022-02-17 | End: 2022-09-19

## 2022-02-17 RX ORDER — ACYCLOVIR 400 MG/1
TABLET ORAL
Qty: 15 TABLET | Refills: 4 | Status: SHIPPED | OUTPATIENT
Start: 2022-02-17 | End: 2022-09-19

## 2022-02-17 ASSESSMENT — PAIN SCALES - GENERAL: PAINLEVEL: NO PAIN (0)

## 2022-02-17 NOTE — NURSING NOTE
"Chief Complaint   Patient presents with     Gyn Exam       Initial /66 (BP Location: Left arm, Patient Position: Sitting, Cuff Size: Adult Large)   Pulse 85   Ht 1.689 m (5' 6.5\")   Wt 85.3 kg (188 lb)   SpO2 99%   BMI 29.89 kg/m   Estimated body mass index is 29.89 kg/m  as calculated from the following:    Height as of this encounter: 1.689 m (5' 6.5\").    Weight as of this encounter: 85.3 kg (188 lb).  Medication Reconciliation: complete  TREVA CAST LPN  "

## 2022-02-18 ASSESSMENT — PATIENT HEALTH QUESTIONNAIRE - PHQ9: SUM OF ALL RESPONSES TO PHQ QUESTIONS 1-9: 0

## 2022-02-18 NOTE — PROGRESS NOTES
CC:  Annual exam  HPI:  Sheridan Roldan is a 41 year old female P2 No LMP recorded. (Menstrual status: IUD).  Occasional light spotting with mirena.  Would like to do fasting labs.  Immunizations reviewed and she would like to complete hepatitis A vaccine. Mammogram due in April. 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   No other c/o.      Past GYN history:  No STD history and HSV  STI testing offered?  Declined       Last PAP smear:  Normal  Mammograms up to date: yes  Last TSH: , normal?  Yes  Last Cholesterol: , normal? Yes    Past Medical History:   Diagnosis Date     Anxiety state, unspecified 3/12/2007     Breech presentation without mention of version, antepartum 1/10/2006     Galactorrhea not associated with childbirth 05/10/2005     Leukorrhea, not specified as infective 2005     Post term pregnancy, antepartum condition or complication 2006     Spotting complicating pregnancy, antepartum condition or complication 2008     Supervision of other normal pregnancy 2005     Threatened , antepartum 2007       Past Surgical History:   Procedure Laterality Date     ABDOMINOPLASTY       BUNIONECTOMY Right 10/29/2018    Procedure: RIGHT 1ST METATARSOPHALANGEAL JOINT CHEILECTOMY WITH IMPLANT;  Surgeon: Katty Schroeder DPM;  [facility]: [facility]     CHEILECTOMY Right 10/29/2018    Procedure: CHEILECTOMY;  Surgeon: Katty Schroeder DPM;  [facility]     CONIZATION LEEP N/A 2016    Procedure: CONIZATION LEEP;  Surgeon: Chioma Wiggins MD;  [facility]     LAPAROSCOPIC SALPINGECTOMY Bilateral 2017    Procedure: LAPAROSCOPIC SALPINGECTOMY;  LAPAROSCOPIC BILATERAL SALPINGECTOMY;  Surgeon: Kalin Hernandez MD;  [facility]: [facility]     MAMMOPLASTY AUGMENTATION BILATERAL         Family History   Problem Relation Age of Onset     Hypertension Father      Other - See Comments Father         lymphoma     Uterine Cancer Cousin        Current Outpatient Medications   Medication Sig  "Dispense Refill     acyclovir (ZOVIRAX) 400 MG tablet TAKE 1 TABLET BY MOUTH EVERY 8 HOURS FOR 5 DAYS 15 tablet 4     ibuprofen (ADVIL/MOTRIN) 800 MG tablet Take 1 tablet (800 mg) by mouth every 8 hours as needed for moderate pain 40 tablet 1     levonorgestrel (MIRENA, 52 MG,) 20 MCG/24HR IUD 1 each (20 mcg) by Intrauterine route continuous 1 each 0     levothyroxine (SYNTHROID/LEVOTHROID) 75 MCG tablet Take 1 tablet (75 mcg) by mouth daily 90 tablet 3     ondansetron (ZOFRAN ODT) 4 MG ODT tab Take 1-2 tablets (4-8 mg) by mouth every 8 hours as needed for nausea or vomiting (Patient not taking: Reported on 2/15/2021) 40 tablet 1     VITAMIN D, CHOLECALCIFEROL, PO Take 6,000 Units by mouth daily (Patient not taking: Reported on 11/30/2021)         Allergies: Erythromycin and Penicillins    ROS:  CONSTITUTIONAL:NEGATIVE for fever, chills  RESP:NEGATIVE for significant cough or SOB  BREAST: NEGATIVE for masses, tenderness or discharge  CV: NEGATIVE for chest pain, palpitations or peripheral edema  GI: NEGATIVE for nausea, abdominal pain, heartburn, or change in bowel habits  : negative for, dysparunia, incontinence, vaginal discharge and bleeding  ENDOCRINE: NEGATIVE for temperature intolerance, skin/hair changes  PSYCHIATRIC: NEGATIVE for changes in mood or affect    EXAM:  Blood pressure 112/66, pulse 85, height 1.689 m (5' 6.5\"), weight 85.3 kg (188 lb), SpO2 99 %, not currently breastfeeding.   BMI= Body mass index is 29.89 kg/m .  General - pleasant female in no acute distress.  Neck - supple without lymphadenopathy or thyromegaly.  Lungs - clear to auscultation bilaterally.  Heart - regular rate and rhythm without murmur.  Breast - no nodularity, asymmetry or nipple discharge bilaterally.  Abdomen - soft, nontender, nondistended, no hepatosplenomegaly.  Pelvic - EG: normal adult female, BUS: within normal limits, Vagina: well rugated, no discharge, Cervix: no lesions or CMT, IUD strings present.  Uterus: firm, " normal sized and nontender, Adnexae: no masses or tenderness.  Rectovaginal - deferred.  Musculoskeletal - no gross deformities.  Neurological - normal strength, sensation, and mental status.      ASSESSMENT/PLAN:  (Z12.4) Screening for cervical cancer  (primary encounter diagnosis)  Comment:   Plan: A pap thin layer screen with  HPV - recommended        age 30 - 65 years (select HPV order below)            (Z01.419) Well woman exam with routine gynecological exam  Comment:   Plan: Lipid Profile (Chol, Trig, HDL, LDL calc), TSH,        T4, free, Glucose, Vitamin D Deficiency            (Z23) Immunization due  Comment:   Plan: HEPATITIS A VACCINE (ADULT)            (A60.04) Herpes simplex vulvovaginitis  Comment:   Plan: acyclovir (ZOVIRAX) 400 MG tablet            (E04.9) Goiter  Comment:   Plan: levothyroxine (SYNTHROID/LEVOTHROID) 75 MCG         tablet              Discussed exercise and healthy eating, including calcium intake.  She should return to the clinic in one year, or sooner if problems arise.

## 2022-02-22 LAB
BKR LAB AP GYN ADEQUACY: NORMAL
BKR LAB AP GYN INTERPRETATION: NORMAL
BKR LAB AP HPV REFLEX: NORMAL
BKR LAB AP PREVIOUS ABNORMAL: NORMAL
PATH REPORT.COMMENTS IMP SPEC: NORMAL
PATH REPORT.COMMENTS IMP SPEC: NORMAL
PATH REPORT.RELEVANT HX SPEC: NORMAL

## 2022-02-24 LAB
HUMAN PAPILLOMA VIRUS 16 DNA: NEGATIVE
HUMAN PAPILLOMA VIRUS 18 DNA: NEGATIVE
HUMAN PAPILLOMA VIRUS FINAL DIAGNOSIS: NORMAL
HUMAN PAPILLOMA VIRUS OTHER HR: NEGATIVE

## 2022-03-14 ENCOUNTER — LAB (OUTPATIENT)
Dept: LAB | Facility: OTHER | Age: 42
End: 2022-03-14
Payer: COMMERCIAL

## 2022-03-14 DIAGNOSIS — Z01.419 WELL WOMAN EXAM WITH ROUTINE GYNECOLOGICAL EXAM: ICD-10-CM

## 2022-03-14 LAB
CHOLEST SERPL-MCNC: 162 MG/DL
FASTING STATUS PATIENT QL REPORTED: YES
FASTING STATUS PATIENT QL REPORTED: YES
GLUCOSE BLD-MCNC: 92 MG/DL (ref 70–99)
HDLC SERPL-MCNC: 53 MG/DL
LDLC SERPL CALC-MCNC: 97 MG/DL
NONHDLC SERPL-MCNC: 109 MG/DL
T4 FREE SERPL-MCNC: 1.25 NG/DL (ref 0.76–1.46)
TRIGL SERPL-MCNC: 59 MG/DL
TSH SERPL DL<=0.005 MIU/L-ACNC: 1.06 MU/L (ref 0.4–4)

## 2022-03-14 PROCEDURE — 36415 COLL VENOUS BLD VENIPUNCTURE: CPT

## 2022-03-14 PROCEDURE — 82947 ASSAY GLUCOSE BLOOD QUANT: CPT

## 2022-03-14 PROCEDURE — 84443 ASSAY THYROID STIM HORMONE: CPT

## 2022-03-14 PROCEDURE — 82306 VITAMIN D 25 HYDROXY: CPT

## 2022-03-14 PROCEDURE — 80061 LIPID PANEL: CPT

## 2022-03-14 PROCEDURE — 84439 ASSAY OF FREE THYROXINE: CPT

## 2022-03-16 LAB — DEPRECATED CALCIDIOL+CALCIFEROL SERPL-MC: 26 UG/L (ref 20–75)

## 2022-03-31 ENCOUNTER — HOSPITAL ENCOUNTER (EMERGENCY)
Facility: HOSPITAL | Age: 42
Discharge: HOME | End: 2022-03-31
Attending: NURSE PRACTITIONER | Admitting: NURSE PRACTITIONER
Payer: COMMERCIAL

## 2022-03-31 ENCOUNTER — APPOINTMENT (OUTPATIENT)
Dept: CT IMAGING | Facility: HOSPITAL | Age: 42
End: 2022-03-31
Attending: NURSE PRACTITIONER
Payer: COMMERCIAL

## 2022-03-31 VITALS
TEMPERATURE: 99.2 F | SYSTOLIC BLOOD PRESSURE: 124 MMHG | RESPIRATION RATE: 18 BRPM | DIASTOLIC BLOOD PRESSURE: 89 MMHG | OXYGEN SATURATION: 98 % | HEART RATE: 83 BPM

## 2022-03-31 DIAGNOSIS — S30.0XXA TRAUMATIC HEMATOMA OF BUTTOCK, INITIAL ENCOUNTER: ICD-10-CM

## 2022-03-31 LAB
ALBUMIN SERPL-MCNC: 3.8 G/DL (ref 3.4–5)
ALP SERPL-CCNC: 60 U/L (ref 40–150)
ALT SERPL W P-5'-P-CCNC: 27 U/L (ref 0–50)
ANION GAP SERPL CALCULATED.3IONS-SCNC: 6 MMOL/L (ref 3–14)
AST SERPL W P-5'-P-CCNC: 17 U/L (ref 0–45)
BASOPHILS # BLD AUTO: 0.1 10E3/UL (ref 0–0.2)
BASOPHILS NFR BLD AUTO: 1 %
BILIRUB SERPL-MCNC: 0.5 MG/DL (ref 0.2–1.3)
BUN SERPL-MCNC: 12 MG/DL (ref 7–30)
CALCIUM SERPL-MCNC: 9.1 MG/DL (ref 8.5–10.1)
CHLORIDE BLD-SCNC: 109 MMOL/L (ref 94–109)
CO2 SERPL-SCNC: 24 MMOL/L (ref 20–32)
CREAT SERPL-MCNC: 0.66 MG/DL (ref 0.52–1.04)
EOSINOPHIL # BLD AUTO: 0.1 10E3/UL (ref 0–0.7)
EOSINOPHIL NFR BLD AUTO: 1 %
ERYTHROCYTE [DISTWIDTH] IN BLOOD BY AUTOMATED COUNT: 12.2 % (ref 10–15)
GFR SERPL CREATININE-BSD FRML MDRD: >90 ML/MIN/1.73M2
GLUCOSE BLD-MCNC: 114 MG/DL (ref 70–99)
HCT VFR BLD AUTO: 42.6 % (ref 35–47)
HGB BLD-MCNC: 14.6 G/DL (ref 11.7–15.7)
IMM GRANULOCYTES # BLD: 0 10E3/UL
IMM GRANULOCYTES NFR BLD: 0 %
LYMPHOCYTES # BLD AUTO: 2.4 10E3/UL (ref 0.8–5.3)
LYMPHOCYTES NFR BLD AUTO: 33 %
MCH RBC QN AUTO: 29.3 PG (ref 26.5–33)
MCHC RBC AUTO-ENTMCNC: 34.3 G/DL (ref 31.5–36.5)
MCV RBC AUTO: 85 FL (ref 78–100)
MONOCYTES # BLD AUTO: 0.4 10E3/UL (ref 0–1.3)
MONOCYTES NFR BLD AUTO: 6 %
NEUTROPHILS # BLD AUTO: 4.3 10E3/UL (ref 1.6–8.3)
NEUTROPHILS NFR BLD AUTO: 59 %
NRBC # BLD AUTO: 0 10E3/UL
NRBC BLD AUTO-RTO: 0 /100
PLATELET # BLD AUTO: 211 10E3/UL (ref 150–450)
POTASSIUM BLD-SCNC: 3.4 MMOL/L (ref 3.4–5.3)
PROT SERPL-MCNC: 7 G/DL (ref 6.8–8.8)
RBC # BLD AUTO: 4.99 10E6/UL (ref 3.8–5.2)
SODIUM SERPL-SCNC: 139 MMOL/L (ref 133–144)
WBC # BLD AUTO: 7.2 10E3/UL (ref 4–11)

## 2022-03-31 PROCEDURE — 250N000011 HC RX IP 250 OP 636: Performed by: NURSE PRACTITIONER

## 2022-03-31 PROCEDURE — 99285 EMERGENCY DEPT VISIT HI MDM: CPT | Mod: 25

## 2022-03-31 PROCEDURE — 74174 CTA ABD&PLVS W/CONTRAST: CPT

## 2022-03-31 PROCEDURE — 82947 ASSAY GLUCOSE BLOOD QUANT: CPT | Performed by: NURSE PRACTITIONER

## 2022-03-31 PROCEDURE — 85025 COMPLETE CBC W/AUTO DIFF WBC: CPT | Performed by: NURSE PRACTITIONER

## 2022-03-31 PROCEDURE — 36415 COLL VENOUS BLD VENIPUNCTURE: CPT | Performed by: NURSE PRACTITIONER

## 2022-03-31 PROCEDURE — 99284 EMERGENCY DEPT VISIT MOD MDM: CPT | Performed by: NURSE PRACTITIONER

## 2022-03-31 RX ORDER — ACETAMINOPHEN AND CODEINE PHOSPHATE 300; 30 MG/1; MG/1
TABLET ORAL
Qty: 15 TABLET | Refills: 0 | Status: SHIPPED | OUTPATIENT
Start: 2022-03-31 | End: 2022-03-31

## 2022-03-31 RX ORDER — IOPAMIDOL 755 MG/ML
70 INJECTION, SOLUTION INTRAVASCULAR ONCE
Status: COMPLETED | OUTPATIENT
Start: 2022-03-31 | End: 2022-03-31

## 2022-03-31 RX ORDER — KETOROLAC TROMETHAMINE 30 MG/ML
30 INJECTION, SOLUTION INTRAMUSCULAR; INTRAVENOUS ONCE
Status: DISCONTINUED | OUTPATIENT
Start: 2022-03-31 | End: 2022-03-31

## 2022-03-31 RX ORDER — ACETAMINOPHEN AND CODEINE PHOSPHATE 300; 30 MG/1; MG/1
TABLET ORAL
Qty: 15 TABLET | Refills: 0 | Status: SHIPPED | OUTPATIENT
Start: 2022-03-31 | End: 2022-05-18

## 2022-03-31 RX ADMIN — IOPAMIDOL 70 ML: 755 INJECTION, SOLUTION INTRAVENOUS at 22:12

## 2022-03-31 ASSESSMENT — ENCOUNTER SYMPTOMS
PSYCHIATRIC NEGATIVE: 1
GASTROINTESTINAL NEGATIVE: 1
CARDIOVASCULAR NEGATIVE: 1
RESPIRATORY NEGATIVE: 1
EYES NEGATIVE: 1
ENDOCRINE NEGATIVE: 1
HEMATOLOGIC/LYMPHATIC NEGATIVE: 1
CONSTITUTIONAL NEGATIVE: 1
NEUROLOGICAL NEGATIVE: 1
ALLERGIC/IMMUNOLOGIC NEGATIVE: 1

## 2022-04-01 ENCOUNTER — MYC MEDICAL ADVICE (OUTPATIENT)
Dept: FAMILY MEDICINE | Facility: OTHER | Age: 42
End: 2022-04-01
Payer: COMMERCIAL

## 2022-04-01 NOTE — ED NOTES
Face to face report given with opportunity to observe patient.    Report given to Letha CONTRERAS RN   3/31/2022  11:01 PM

## 2022-04-01 NOTE — ED NOTES
Pt reported this morning she slipped on her steps and landed on her buttocks. The left buttocks per pt had a small hematoma and then throughout the day is has grown greatly in size and the hematoma is larger.  Area is hardened.  Around 1600 pt took tylenol and ibuprofen.  Declines any pain meds at this time.  denies any urinary problem.  No bruising in flank area or abdominal area.

## 2022-04-01 NOTE — ED PROVIDER NOTES
History   No chief complaint on file.    HPI   History of presenting illness given by patient.    Sheridan Roldan is a 41 year old female who who presents to the emergency room for evaluation of left buttocks hematoma.  This morning when she was on her way to work she stepped outside and slid down her steps landing on her left buttocks.  She developed a large hematoma that has continued to grow.  She is here for evaluation of bleeding as her buttocks continues to swell.  Her pain is a 5/10, she has used ibuprofen with good relief of pain.  She denies any other pain or injury.    Allergies:  Allergies   Allergen Reactions     Erythromycin Nausea and Vomiting     Erythromycin base     Penicillins Rash       Problem List:    Patient Active Problem List    Diagnosis Date Noted     Perineal lump 12/01/2020     Priority: Medium     Well woman exam with routine gynecological exam 06/10/2019     Priority: Medium     Multiple joint pain 08/22/2018     Priority: Medium     Throat pain 08/22/2018     Priority: Medium     Declined ENT referral       History of bilateral fallopian tube excision 06/06/2018     Priority: Medium     Papanicolaou smear of cervix with atypical squamous cells of undetermined significance (ASC-US) 05/31/2017     Priority: Medium     Neg hr hpv       NO SHOW 09/13/2016     Priority: Medium     No showed Dr. Wiggins 9/13/16       RC II (cervical intraepithelial neoplasia II) 06/13/2016     Priority: Medium     LEEP=cervicitis only       High risk HPV infection 05/24/2016     Priority: Medium     other       Papanicolaou smear of cervix with low grade squamous intraepithelial lesion (LGSIL) 05/23/2016     Priority: Medium     Chronic tonsillitis 04/15/2015     Priority: Medium     Tonsillar hypertrophy 04/15/2015     Priority: Medium     Encounter for school history and physical examination 01/08/2014     Priority: Medium     Problem list name updated by automated process. Provider to review        Fatigue 2014     Priority: Medium     Mastodynia 2014     Priority: Medium     Goiter 2014     Priority: Medium     Family planning 05/15/2013     Priority: Medium     MIrena due for change in           Past Medical History:    Past Medical History:   Diagnosis Date     Anxiety state, unspecified 3/12/2007     Breech presentation without mention of version, antepartum 1/10/2006     Galactorrhea not associated with childbirth 05/10/2005     Leukorrhea, not specified as infective 2005     Post term pregnancy, antepartum condition or complication 2006     Spotting complicating pregnancy, antepartum condition or complication 2008     Supervision of other normal pregnancy 2005     Threatened , antepartum 2007       Past Surgical History:    Past Surgical History:   Procedure Laterality Date     ABDOMINOPLASTY       BUNIONECTOMY Right 10/29/2018    Procedure: RIGHT 1ST METATARSOPHALANGEAL JOINT CHEILECTOMY WITH IMPLANT;  Surgeon: Katty Schroeder DPM;  [facility]     CHEILECTOMY Right 10/29/2018    Procedure: CHEILECTOMY;  Surgeon: Katty Schroeder DPM;  [facility]     CONIZATION LEEP N/A 2016    Procedure: CONIZATION LEEP;  Surgeon: Chioma Wiggins MD;  [facility]     LAPAROSCOPIC SALPINGECTOMY Bilateral 2017    Procedure: LAPAROSCOPIC SALPINGECTOMY;  LAPAROSCOPIC BILATERAL SALPINGECTOMY;  Surgeon: Kalin Hernandez MD;  [facility]     MAMMOPLASTY AUGMENTATION BILATERAL         Family History:    Family History   Problem Relation Age of Onset     Hypertension Father      Other - See Comments Father         lymphoma     Uterine Cancer Cousin        Social History:  Marital Status:   [4]  Social History     Tobacco Use     Smoking status: Former Smoker     Quit date: 2002     Years since quittin.2     Smokeless tobacco: Never Used   Substance Use Topics     Alcohol use: Yes     Comment: socially     Drug use: No         Medications:    acetaminophen-codeine (TYLENOL W/CODEINE #3) 300-30 MG per tablet  acyclovir (ZOVIRAX) 400 MG tablet  ibuprofen (ADVIL/MOTRIN) 800 MG tablet  levonorgestrel (MIRENA, 52 MG,) 20 MCG/24HR IUD  levothyroxine (SYNTHROID/LEVOTHROID) 75 MCG tablet  ondansetron (ZOFRAN ODT) 4 MG ODT tab  VITAMIN D, CHOLECALCIFEROL, PO          Review of Systems   Constitutional: Negative.    HENT: Negative.    Eyes: Negative.    Respiratory: Negative.    Cardiovascular: Negative.    Gastrointestinal: Negative.    Endocrine: Negative.    Genitourinary: Negative.    Musculoskeletal:        Buttock pain and swelling   Skin: Negative.    Allergic/Immunologic: Negative.    Neurological: Negative.    Hematological: Negative.    Psychiatric/Behavioral: Negative.        Physical Exam   BP: 146/100  Pulse: (!) 129  Temp: 99.2  F (37.3  C)  Resp: 18  SpO2: 98 %      Physical Exam  Vitals and nursing note reviewed.   Constitutional:       General: She is not in acute distress.     Appearance: Normal appearance. She is normal weight.   HENT:      Head: Normocephalic and atraumatic.      Nose: Nose normal.      Mouth/Throat:      Mouth: Mucous membranes are moist.      Pharynx: Oropharynx is clear.   Eyes:      Extraocular Movements: Extraocular movements intact.      Conjunctiva/sclera: Conjunctivae normal.      Pupils: Pupils are equal, round, and reactive to light.   Cardiovascular:      Rate and Rhythm: Normal rate and regular rhythm.      Pulses: Normal pulses.   Pulmonary:      Effort: Pulmonary effort is normal.      Breath sounds: Normal breath sounds.   Abdominal:      General: Abdomen is flat. Bowel sounds are normal.      Palpations: Abdomen is soft.      Tenderness: There is no abdominal tenderness. There is no right CVA tenderness or left CVA tenderness.   Musculoskeletal:         General: Swelling and tenderness present. Normal range of motion.      Cervical back: Normal range of motion and neck supple. No  tenderness.      Comments: Left buttock swelling, tenderness, and ecchymosis   Skin:     General: Skin is warm.      Findings: Bruising present. No erythema.      Comments: Large hematoma on the left buttock with severe ecchymosis   Neurological:      General: No focal deficit present.      Mental Status: She is alert and oriented to person, place, and time.   Psychiatric:         Mood and Affect: Mood normal.         Behavior: Behavior normal.         Thought Content: Thought content normal.         Judgment: Judgment normal.         ED Course              No results found for this or any previous visit (from the past 24 hour(s)).    Medications   iopamidol (ISOVUE-370) solution 70 mL (70 mLs Intravenous Given 3/31/22 2212)   sodium chloride (PF) 0.9% PF flush 100 mL (100 mLs Intravenous Given 3/31/22 2212)       Assessments & Plan (with Medical Decision Making)   Findings as above.  41-year-old female presents with hematoma to the left buttocks after falling on concrete stills outside this morning.  She reports between the time this morning when she fell and now her buttocks has significantly increased in size 4 times.  She has severe pain, but her concern is that she is bleeding internally into this buttocks.  On assessment she has a significant hematoma and ecchymosis present to the left buttocks that is this band of the lower buttocks into the upper thigh.  Area is very firm and painful for patient to sit on.  Patient has declined pain medication and is only requesting to make sure that she is not continuously bleeding into the space.      CT abdomen pelvis with contrast:                                                                   IMPRESSION:   Large subcutaneous hematoma in the left buttock without evidence of  active hemorrhage. No evidence of apparent intramuscular hematoma.     Subtle atelectasis/pneumonia in the right lower lobe.    Discharge plan: I reviewed imaging results with patient and that she  indeed has a large subcutaneous hematoma to the left buttocks without evidence of active hemorrhage.  We discussed the discharge plan of icing, resting, and using Tylenol ibuprofen for discomfort.  I did prescribe patient narcotic medication in case ibuprofen and Tylenol did not decrease pain.  Patient instructed to use a memory foam pillow or a doughnut to sit on to relieve pressure off that buttocks.  Patient instructed to follow-up if her symptoms worsen or she develops any new concerning symptoms in ER or in clinic.       I have reviewed the nursing notes.    I have reviewed the findings, diagnosis, plan and need for follow up with the patient.      Discharge Medication List as of 3/31/2022 11:34 PM          Final diagnoses:   Traumatic hematoma of buttock, initial encounter       3/31/2022   [facility]     Nori Cunningham APRN CNP  04/06/22 [id number]

## 2022-04-01 NOTE — ED TRIAGE NOTES
Pt slipped while walking down the stairs this am. Fell backwards, hit the back of her head and hit her lower back and buttock on the stair. C/o hematoma to left buttock has enlarged significantly in the past couple of hours.

## 2022-04-01 NOTE — ED NOTES
IV removed.  Discharge instructions discussed with the patient.  Patient verbalized understanding and has no further questions at this time.  Patient is given print out for Intsty medication.  Patient will follow up with SANNA Waller and/or return to the emergency room with worsening symptoms/new concerns.  Home with mom.

## 2022-04-01 NOTE — DISCHARGE INSTRUCTIONS
Thank you for choosing [facility] for your healthcare needs today.  For your traumatic hematoma on your buttocks, please apply ice every 2-3 hours for no longer than 20 minutes, you may want to sit on a pillow.  Use Tylenol or ibuprofen for pain.  If your symptoms worsen or you develop any new concerning symptoms please return to ER. Thank you    What to expect when you have contrast    During your exam, we will inject  contrast  into your vein or artery. (Contrast is a clear liquid with iodine in it. It shows up on X-rays.)    You may feel warm or hot. You may have a metal taste in your mouth and a slight upset stomach. You may also feel pressure near the kidneys and bladder. These effects will last about 1 to 3 minutes.    Please tell us if you have:   Sneezing    Itching   Hives    Swelling in the face   A hoarse voice   Breathing problems   Other new symptoms    Serious problems are rare.  They may include:   Irregular heartbeat    Seizures   Kidney failure             Tissue damage   Shock     Death    If you have any problems during the exam, we  will treat them right away.    When you get home    Call your hospital if you have any new symptoms in the next 2 days, like hives or swelling. (Phone numbers are at the bottom of this page.) Or call your family doctor.     If you have wheezing or trouble breathing, call 911.    Self-care  -Drink at least 4 extra glasses of water today.   This reduces the stress on your kidneys.  -Keep taking your regular medicines.    The contrast will pass out of your body in your  Urine(pee). This will happen in the next 24 hours. You  will not feel this. Your urine will not  change color.    If you have kidney problems or take metformin    Drink 4 to 8 large glasses of water for the next  2 days, if you are not on a fluid restriction.    ?If you take metformin (Glucophage or Glucovance) for diabetes, keep taking it.      ?Your kidney function tests are  abnormal.  If you take Metformin, do not take it for 48 hours. Please go to your clinic for a blood test within 3 days after your exam before the restarting this medicine.     (Note to provider:please give patient prescription for lab tests.)    ?Special instructions:     I have read and understand the above information.    Patient Sign Here:______________________________________Date:________Time:______    Staff Sign Here:________________________________________Date:_______Time:______      Radiology Departments:     ?[facility]: [phone] ?[place]: [phone]     ?[place]: [phone] ?[facility]:[phone]      ?[place]: [phone]  ?[facility]: [phone]  ?[place]:[phone]    ?[facility]:[phone]  ?[facility]:[phone]

## 2022-04-04 NOTE — TELEPHONE ENCOUNTER
This is likely atelectasis, which is not pneumonia. As long as she is improving, no further work up needed.

## 2022-05-18 ENCOUNTER — VIRTUAL VISIT (OUTPATIENT)
Dept: FAMILY MEDICINE | Facility: OTHER | Age: 42
End: 2022-05-18
Payer: COMMERCIAL

## 2022-05-18 DIAGNOSIS — U07.1 INFECTION DUE TO 2019 NOVEL CORONAVIRUS: Primary | ICD-10-CM

## 2022-05-18 DIAGNOSIS — U07.1 CLINICAL DIAGNOSIS OF COVID-19: ICD-10-CM

## 2022-05-18 PROCEDURE — 99203 OFFICE O/P NEW LOW 30 MIN: CPT | Mod: 95 | Performed by: PHYSICIAN ASSISTANT

## 2022-05-18 ASSESSMENT — PAIN SCALES - GENERAL: PAINLEVEL: MILD PAIN (3)

## 2022-05-18 NOTE — PROGRESS NOTES
Sheridan is a 41 year old who is being evaluated via a billable video visit.      How would you like to obtain your AVS? MyChart  If the video visit is dropped, the invitation should be resent by: Text to cell phone: [phone]  Will anyone else be joining your video visit? No    Video Start Time: 9:20 AM    Assessment & Plan     ICD-10-CM    1. Infection due to 2019 novel coronavirus  U07.1 nirmatrelvir and ritonavir (PAXLOVID) therapy pack   2. Clinical diagnosis of COVID-19  U07.1 nirmatrelvir and ritonavir (PAXLOVID) therapy pack     - Patient with 2 days of symptoms, positive for COVID-19 today with at home test   - She is otherwise healthy   - She was told by her clinic in [place] that all she needed to do was call and could get Paxlovid, her friend that is also sick got it easily  - Discussed that she doesn't meet risk criteria, patient was very frustrated    - Since a provider told her this, I am inclined to respond to her request  - No drug-drug interaction with her Levothyroxine     Reviewed Paxlovid use and side effects at length   - Advised on symptomatic cares including quarantine time and Tylenol   - Discussed warning signs that would warrant return to clinic/ED       Review of the result(s) of each unique test - None   Diagnosis or treatment significantly limited by social determinants of health - None     30 minutes spent on the date of the encounter doing chart review, history and exam, documentation and further activities as noted above    The patient indicates understanding of these issues and agrees with the plan.    NICOLE Barney [facility]   Sheridan is a 41 year old who presents for the following health issues     HPI   COVID-19 Symptom Review  How many days ago did these symptoms start? Yesterday morning   Negative yesterday    Positive test on today 5/18/22 (felt worse today)   Are any of the following symptoms significant for  you?    New or worsening difficulty breathing? No    Worsening cough? Yes, it's a dry cough. Tight     Fever or chills? Yes, the highest temperature was 100.7 and chills     Headache: YES - frontal and behind neck     Sore throat: YES    Chest pain: no    Diarrhea: no    Body aches? YES    What treatments has patient tried? Acetaminophen - hasn't yet was going to start soon   Does patient live in a nursing home, group home, or shelter? no  Does patient have a way to get food/medications during quarantined? Yes, I have a friend or family member who can help me.  Other people in house, they are fine     - Just thyroid problem   - BMI 29   - They told daughter could get it   - Doesn't take Synthroid very consistently      Hasn't taken her Acyclovir in a long time      Just has a mirena       Review of Systems   Constitutional, HEENT, cardiovascular, pulmonary, gi and gu systems are negative, except as otherwise noted.      Objective       Vitals:  No vitals were obtained today due to virtual visit.  0 kg       Physical Exam   GENERAL: Moderately ill appearing, alert and no distress  EYES: Eyes grossly normal to inspection.  No discharge or erythema, or obvious scleral/conjunctival abnormalities.  RESP: No audible wheeze, cough, or visible cyanosis.  No visible retractions or increased work of breathing.    SKIN: Visible skin clear. No significant rash, abnormal pigmentation or lesions.  NEURO: Cranial nerves grossly intact.  Mentation and speech appropriate for age.  PSYCH: Mentation appears normal, affect normal/bright, judgement and insight intact, normal speech and appearance well-groomed.    Diagnostics: None         Video-Visit Details    Type of service:  Video Visit    Video End Time:9:40 AM    Originating Location (pt. Location): Home    Distant Location (provider location):  [facility]     Platform used for Video Visit: Triviala

## 2022-06-07 ENCOUNTER — HOSPITAL ENCOUNTER (OUTPATIENT)
Dept: ULTRASOUND IMAGING | Facility: HOSPITAL | Age: 42
Discharge: HOME | End: 2022-06-07
Attending: SURGERY | Admitting: SURGERY
Payer: COMMERCIAL

## 2022-06-07 DIAGNOSIS — R10.11 RUQ ABDOMINAL PAIN: ICD-10-CM

## 2022-06-07 DIAGNOSIS — K76.9 LIVER LESION: ICD-10-CM

## 2022-06-07 PROCEDURE — 76705 ECHO EXAM OF ABDOMEN: CPT

## 2022-06-15 ENCOUNTER — TELEPHONE (OUTPATIENT)
Dept: MAMMOGRAPHY | Facility: OTHER | Age: 42
End: 2022-06-15

## 2022-06-15 ENCOUNTER — ANCILLARY PROCEDURE (OUTPATIENT)
Dept: MAMMOGRAPHY | Facility: OTHER | Age: 42
End: 2022-06-15
Attending: NURSE PRACTITIONER
Payer: COMMERCIAL

## 2022-06-15 DIAGNOSIS — Z12.31 ENCOUNTER FOR SCREENING MAMMOGRAM FOR BREAST CANCER: ICD-10-CM

## 2022-06-15 PROCEDURE — 77063 BREAST TOMOSYNTHESIS BI: CPT | Mod: TC | Performed by: RADIOLOGY

## 2022-06-15 PROCEDURE — 77067 SCR MAMMO BI INCL CAD: CPT | Mod: TC | Performed by: RADIOLOGY

## 2022-08-01 ENCOUNTER — OFFICE VISIT (OUTPATIENT)
Dept: OBGYN | Facility: OTHER | Age: 42
End: 2022-08-01
Attending: OBSTETRICS & GYNECOLOGY
Payer: COMMERCIAL

## 2022-08-01 VITALS
HEIGHT: 67 IN | RESPIRATION RATE: 16 BRPM | WEIGHT: 185 LBS | HEART RATE: 64 BPM | SYSTOLIC BLOOD PRESSURE: 110 MMHG | TEMPERATURE: 98.2 F | BODY MASS INDEX: 29.03 KG/M2 | DIASTOLIC BLOOD PRESSURE: 66 MMHG

## 2022-08-01 DIAGNOSIS — Z30.430 ENCOUNTER FOR IUD INSERTION: Primary | ICD-10-CM

## 2022-08-01 PROBLEM — R07.0 THROAT PAIN: Status: RESOLVED | Noted: 2018-08-22 | Resolved: 2022-08-01

## 2022-08-01 PROBLEM — R87.610 PAPANICOLAOU SMEAR OF CERVIX WITH ATYPICAL SQUAMOUS CELLS OF UNDETERMINED SIGNIFICANCE (ASC-US): Status: RESOLVED | Noted: 2017-05-31 | Resolved: 2022-08-01

## 2022-08-01 PROBLEM — Z90.79: Status: RESOLVED | Noted: 2018-06-06 | Resolved: 2022-08-01

## 2022-08-01 PROBLEM — Z01.419 WELL WOMAN EXAM WITH ROUTINE GYNECOLOGICAL EXAM: Status: RESOLVED | Noted: 2019-06-10 | Resolved: 2022-08-01

## 2022-08-01 PROCEDURE — 58300 INSERT INTRAUTERINE DEVICE: CPT | Performed by: OBSTETRICS & GYNECOLOGY

## 2022-08-01 ASSESSMENT — PAIN SCALES - GENERAL: PAINLEVEL: NO PAIN (0)

## 2022-08-01 NOTE — PROCEDURES
"GYN Procedure Note     PROCEDURE PERFORMED  IUD Insertion  IUD Type: Mirena    INDICATION  1 IUD insertion.    OBJECTIVE  /66   Pulse 64   Temp 98.2  F (36.8  C)   Resp 16   Ht 1.689 m (5' 6.5\")   Wt 83.9 kg (185 lb)   BMI 29.41 kg/m      Please see separate note for details of physical examination.    PROCEDURE  I reviewed the risks, benefits, alternatives, indication and expected outcome of IUD insertion with the patient. The patient verbalized understanding and desired to proceed. Consent form was signed.  After informed consent was obtained from the patient, the patient was prepped and draped in the usual fashion. A speculum was placed in the vagina to visualize the cervix. The cervix was then swabbed with betadine. A tenaculum was applied to the anterior cervical lip. An endometrial pipelle was passed through the cervical os into the uterine cavity and the uterus sounded to 7 cm. The Mirena device was opened, inspected and found to be intact. The Mirena insertion device was set to the 7 cm benito. The introduction catheter was passed through the cervical os into the endometrial cavity and the Mirena IUD was deployed at the 7 cm benito in the usual fashion. The IUD introduction catheter was removed. The IUD strings were trimmed to 3 cm length. The tenaculum was removed from the cervix and the cervix was hemostatic.  All instruments were removed from the vagina. There were no complications. The patient tolerated the procedure well with a minimal amount of cramping noted.    Chaperone: Rima Ram MD  Obstetrics and Gynecology    "

## 2022-08-01 NOTE — NURSING NOTE
"Chief Complaint   Patient presents with     IUD       Initial /66   Pulse 64   Ht 1.689 m (5' 6.5\")   Wt 83.9 kg (185 lb)   BMI 29.41 kg/m   Estimated body mass index is 29.41 kg/m  as calculated from the following:    Height as of this encounter: 1.689 m (5' 6.5\").    Weight as of this encounter: 83.9 kg (185 lb).  Medication Reconciliation: complete  Debra Dave LPN    "

## 2022-08-01 NOTE — PROGRESS NOTES
"CHIEF COMPLAINT / REASON FOR VISIT  Patient requests Mirena IUD.    HISTORY OF PRESENT ILLNESS  Sheridan Roldan is a 41 year old  with No LMP recorded. (Menstrual status: IUD). who presents requesting Miren IUD. She had a Mirena IUD placed on 2018 for amenorrhea.  The patient accidentally removed her Mirena IUD over the weekend.  She would like to have a new Mirena IUD placed.  She uses Mirena IUD for menstrual cycle control and amenorrhea.  She has bilateral salpingectomy for sterilization.  She is sexually active and denies dyspareunia or postcoital spotting.  She is very happy with the Mirena IUD.    MENSTRUAL HISTORY  Menarche was at age 10.  Menses: Amenorrhea with Mirena IUD.  Intermenstrual bleeding: Denies.  Dysmenorrhea: Denies.  She is sexually active and denies issues with intercourse.   Dyspareunia: Denies.  Postcoital spotting: Denies.  Current contraception: IUD and bilateral salpingectomy.  STD History: No STD history.  Last Pap smear history: Normal.  Mammogram history: Normal.    ALLERGIES     Allergies   Allergen Reactions     Erythromycin Nausea and Vomiting     Erythromycin base     Penicillins Rash       MEDICATIONS    Current Outpatient Medications:      acyclovir (ZOVIRAX) 400 MG tablet, TAKE 1 TABLET BY MOUTH EVERY 8 HOURS FOR 5 DAYS, Disp: 15 tablet, Rfl: 4     levothyroxine (SYNTHROID/LEVOTHROID) 75 MCG tablet, Take 1 tablet (75 mcg) by mouth daily, Disp: 90 tablet, Rfl: 3    Current Facility-Administered Medications:      levonorgestrel (MIRENA) 20 MCG/DAY IUD 20 mcg, 1 each, Intrauterine, Continuous, Lenard Ram MD    REVIEW OF SYSTEMS  As per HPI otherwise negative.    The patient's Medical Hx, Surgical Hx, Obstetrical Hx, Social Hx, and Family Hx have been reviewed and updated in the electronic medical record.    OBJECTIVE  /66   Pulse 64   Temp 98.2  F (36.8  C)   Resp 16   Ht 1.689 m (5' 6.5\")   Wt 83.9 kg (185 lb)   BMI 29.41 kg/m      General:  " Well-developed, well-nourished female in no apparent distress.  Neurological: Alert and oriented x3.  Lungs:  Clear to auscultation bilaterally with good inspiratory effort.  No wheezing rhonchi or rales noted. Breathing nonlabored.  Heart:  Regular rate and rhythm without murmur. No JVD.  No peripheral vascular disease.  Abdomen: Soft, nontender, nondistended, positive bowel sounds.  No organomegaly. No rebound, no guarding.  Pelvic exam:  Normal external female genitalia without lesions or abnormalities. Normal pubic hair distribution. Urethral meatus normal in appearance and without masses. Normal Bartholin, Urethral and Edgard's glands. Vaginal mucosa is pink and moist with a small amount of physiologic discharge present in the posterior vaginal fornix. No vaginal lesions.  Normal multiparous cervix without lesions or abnormalities. No cervical motion tenderness to palpation. The bladder and urethra are nontender to palpation. Uterus is normal size, shape, consistency, anteflexed, mobile, and nontender. Uterine descent is minimal.  No adnexal masses or tenderness bilaterally.  Rectal exam:  No external hemorrhoids noted. No rectovaginal nodularity noted. Examination confirms the vaginal exam.  Extremities:  No clubbing cyanosis or edema. Nontender bilaterally.     Chaperone: Rima Maxwell LPN    DIAGNOSTICS  2022 Pap NILM, negative HPV    PROCEDURE:  Mirena IUD was inserted. Please see separate note for details.    ASSESSMENT / PLAN  Sheridan Roldan is a 41 year old  female who presents requesting IUD for long-term contraception.    1 Intrauterine device counseling  - The patient is currently using bilateral salpingectomy for sterilization.  The patient uses Mirena IUD for menstrual cycle control and amenorrhea.  - The patient requests Mirena IUD for menstrual cycle control in amenorrhea.  - The risks, benefits, alternative and indications of intrauterine device (IUD) were discussed with the  patient. I discussed risk of bleeding and postcoital spotting with an IUD. I discussed the risk of amenorrhea with the Mirena IUD.  I discussed increased risk of intrauterine infection and sexually transmitted disease with an IUD.  I discussed the risk of pain and cramping, uterine perforation and dyspareunia. I discussed the risk of IUD expulsion. I discussed that the IUD does not protect against sexually transmitted disease and that condom usage is recommended for STD prevention. I discussed the risk that pregnancy can occur with the IUD in place and to alert us if she has symptoms of pregnancy and a positive pregnancy test. I discussed the increased risk of ectopic pregnancy with an IUD. The patient verbalizes understanding and desires to proceed.  - The consent form was reviewed and signed.  - I recommend the patient remain abstinent for at least 7 days to allow proper healing after IUD placement.  - The patient should follow back up in clinic if she develops any problems after IUD placement.  - I reviewed IUD string checks with the patient.  - I reviewed with the patient that the Mirena IUD is effective for 5 years and will need to be removed or replaced 5 years from the insertion date.  - I reviewed sexually transmitted disease precautions with the patient.  - All the patient's questions were answered.    2 IUD insertion  - A Mirena IUD was inserted as mentioned above without difficulty. The patient tolerated the procedure well.  - Please see separate note for details.    - Problem list reviewed and updated.  - Follow up annually or sooner as needed.    Lenard Ram MD  Obstetrics and Gynecology

## 2022-08-19 ENCOUNTER — LAB (OUTPATIENT)
Dept: LAB | Facility: OTHER | Age: 42
End: 2022-08-19
Payer: COMMERCIAL

## 2022-08-19 DIAGNOSIS — N76.0 BACTERIAL VAGINOSIS: Primary | ICD-10-CM

## 2022-08-19 DIAGNOSIS — N89.8 VAGINAL DISCHARGE: ICD-10-CM

## 2022-08-19 DIAGNOSIS — B96.89 BACTERIAL VAGINOSIS: Primary | ICD-10-CM

## 2022-08-19 LAB
CLUE CELLS: PRESENT
TRICHOMONAS, WET PREP: ABNORMAL
WBC'S/HIGH POWER FIELD, WET PREP: ABNORMAL
YEAST, WET PREP: ABNORMAL

## 2022-08-19 PROCEDURE — 87210 SMEAR WET MOUNT SALINE/INK: CPT

## 2022-08-19 RX ORDER — METRONIDAZOLE 500 MG/1
500 TABLET ORAL 2 TIMES DAILY
Qty: 14 TABLET | Refills: 0 | Status: SHIPPED | OUTPATIENT
Start: 2022-08-19 | End: 2022-08-26

## 2022-09-04 ENCOUNTER — HEALTH MAINTENANCE LETTER (OUTPATIENT)
Age: 42
End: 2022-09-04

## 2022-09-19 DIAGNOSIS — E04.9 GOITER: ICD-10-CM

## 2022-09-19 DIAGNOSIS — A60.04 HERPES SIMPLEX VULVOVAGINITIS: ICD-10-CM

## 2022-09-19 RX ORDER — LEVOTHYROXINE SODIUM 75 UG/1
TABLET ORAL
Qty: 90 TABLET | Refills: 0 | Status: SHIPPED | OUTPATIENT
Start: 2022-09-19 | End: 2023-02-28

## 2022-09-19 RX ORDER — ACYCLOVIR 400 MG/1
TABLET ORAL
Qty: 30 TABLET | Refills: 0 | Status: SHIPPED | OUTPATIENT
Start: 2022-09-19 | End: 2024-07-16

## 2022-10-31 NOTE — PROGRESS NOTES
Subjective     Sheridan Roldan is a 39 year old female who presents to clinic today for the following health issues:    HPI   Bilateral Breast Pain      Duration: has been ongoing for the last six weeks and this past week the pain has increased and is more often; has now reached out to her axillary area per patient it feels more swollen    Description (location/character/radiation): breat pain    Intensity:  mild, 2/10    Accompanying signs and symptoms:     History (similar episodes/previous evaluation): she had this pain in the past only in the armpits though not this fullness she describes    Precipitating or alleviating factors: None    Therapies tried and outcome: has tried heat it made it feel better, had tylenol last night does not know if it really helped    Family history of lymphoma (father and uncle)       Patient Active Problem List   Diagnosis     Family planning     Encounter for school history and physical examination     Fatigue     Mastodynia     Goiter     Chronic tonsillitis     Tonsillar hypertrophy     Papanicolaou smear of cervix with low grade squamous intraepithelial lesion (LGSIL)     High risk HPV infection     RC II (cervical intraepithelial neoplasia II)     NO SHOW     Papanicolaou smear of cervix with atypical squamous cells of undetermined significance (ASC-US)     History of bilateral fallopian tube excision     Multiple joint pain     Throat pain     Well woman exam with routine gynecological exam     Past Surgical History:   Procedure Laterality Date     ABDOMINOPLASTY       BUNIONECTOMY Right 10/29/2018    Procedure: RIGHT 1ST METATARSOPHALANGEAL JOINT CHEILECTOMY WITH IMPLANT;  Surgeon: Katty Schroeder DPM;  Location: [facility]     CHEILECTOMY Right 10/29/2018    Procedure: CHEILECTOMY;  Surgeon: Katty Schroeder DPM;  Location: [facility]     CONIZATION LEEP N/A 8/1/2016    Procedure: CONIZATION LEEP;  Surgeon: Chioma Wiggins MD;  Location: [facility]     LAPAROSCOPIC  SALPINGECTOMY Bilateral 2017    Procedure: LAPAROSCOPIC SALPINGECTOMY;  LAPAROSCOPIC BILATERAL SALPINGECTOMY;  Surgeon: Kalin Hernandez MD;  Location: [facility]     MAMMOPLASTY AUGMENTATION BILATERAL         Social History     Tobacco Use     Smoking status: Former Smoker     Last attempt to quit: 2002     Years since quittin.2     Smokeless tobacco: Never Used   Substance Use Topics     Alcohol use: Yes     Comment: socially     Family History   Problem Relation Age of Onset     Hypertension Father      Other - See Comments Father         lymphoma     Uterine Cancer Cousin          Current Outpatient Medications   Medication Sig Dispense Refill     acyclovir (ZOVIRAX) 5 % cream Apply topically 5 times daily 5 g 12     ibuprofen (ADVIL/MOTRIN) 800 MG tablet Take 1 tablet (800 mg) by mouth every 8 hours as needed for moderate pain 40 tablet 1     levonorgestrel (MIRENA, 52 MG,) 20 MCG/24HR IUD 1 each (20 mcg) by Intrauterine route continuous 1 each 0     levothyroxine (SYNTHROID/LEVOTHROID) 75 MCG tablet Take 1 tablet (75 mcg) by mouth daily (Patient not taking: Reported on 4/3/2020) 90 tablet 4     ondansetron (ZOFRAN ODT) 4 MG ODT tab Take 1-2 tablets (4-8 mg) by mouth every 8 hours as needed for nausea or vomiting (Patient not taking: Reported on 6/10/2019) 40 tablet 1     VITAMIN D, CHOLECALCIFEROL, PO Take 6,000 Units by mouth daily       Allergies   Allergen Reactions     Erythromycin Nausea and Vomiting     Erythromycin base     Penicillins Rash         Reviewed and updated as needed this visit by Provider  Allergies  Meds  Problems  Med Hx  Surg Hx         Review of Systems   ROS COMP: Constitutional, HEENT, cardiovascular, pulmonary, gi and gu systems are negative, except as otherwise noted. +bilateral breast pain with axillary fullness. HX of saline breast implants in .      Objective    /60 (BP Location: Right arm, Patient Position: Sitting, Cuff Size: Adult Regular)   Pulse 98    "Temp 98.8  F (37.1  C) (Tympanic)   Ht 1.702 m (5' 7\")   Wt 80.7 kg (178 lb)   SpO2 98%   BMI 27.88 kg/m    Body mass index is 27.88 kg/m .  Physical Exam   GENERAL: healthy, alert and no distress  NECK: no adenopathy, no asymmetry, masses, or scars and thyroid normal to palpation  RESP: lungs clear to auscultation - no rales, rhonchi or wheezes  CV: regular rate and rhythm, normal S1 S2, no S3 or S4, no murmur, click or rub, no peripheral edema and peripheral pulses strong  MS: no gross musculoskeletal defects noted, no edema  SKIN: no suspicious lesions or rashes  NEURO: Normal strength and tone, mentation intact and speech normal  PSYCH: mentation appears normal, affect normal/bright  LYMPH: no cervical, supraclavicular, axillary, or inguinal adenopathy  BREAST: No dimpling of masses on palpation. No axillary lymph node enlargement palpated. No nipple drainage. Side of right breast with lipoma. No skin color change to breasts. Slight redness without warmth to the touch to lateral side of left breast (she had a rice heating pad on it last night)    Diagnostic Test Results:  Labs reviewed in Epic        Assessment & Plan   Assessment    I talked with Sheridan in mammogram and she is able to do a mammogram today. She will go directly to breast center for mammogram after her appointment today. She will be notified of her labs later today.     Plan  (N64.4) Breast pain  (primary encounter diagnosis)  Comment: check labs and mammogram   Plan: CBC with platelets and differential,         Comprehensive metabolic panel, TSH, CRP,         inflammation, MA Diagnostic with Implants         Bilateral w/Pierre, US Breast Bilateral Complete         4 Quadrants, CANCELED: MA DIAGNOSTIC DIGITAL         BILATERAL ([place])              BMI:   Estimated body mass index is 27.88 kg/m  as calculated from the following:    Height as of this encounter: 1.702 m (5' 7\").    Weight as of this encounter: 80.7 kg (178 lb).   Weight " management plan: Discussed healthy diet and exercise guidelines        See Patient Instructions    Return if symptoms worsen or fail to improve.    Hannah Miller NP  [facility]             Kidney Stones    Kidney stones (urolithiasis) are crystal deposits that form inside your kidneys. Pain is caused by the stone moving through the urinary tract, causing spasms of the ureter. Drink enough water and fluids to keep your urine clear or pale yellow. This will help you to pass the stone or stone fragments. If provided a strainer, strain all urine and keep all particulate matter and stones for a follow up appointment with a urologist.    SEEK IMMEDIATE MEDICAL CARE IF YOU HAVE ANY OF THE FOLLOWING SYMPTOMS: pain not controlled with medication, fever/chills, worsening vomiting, inability to urinate, or dizziness/lightheadedness. KIDNEY STONE - You were found to have a kidney stone today that is causing your pain. The kidney stone is still in your ureter and is likely going to continue to cause pain until it passes. Your results are on the pages to follow. No other testing needs to be done today, however you will require further evaluation after leaving the Emergency Department today. The remaining workup will be done as an outpatient.    GENERAL INSTRUCTIONS - Be sure to strain your urine and try to find the stone to bring to the urologist for analysis.   You will need to stay well hydrated and take various medications to make this process easier.   Drink lots of fluids.  Take FLOMAX once a day, nightly to help with urination and help pass the stone.   Pain medications as instructed below.     FOLLOW UP - Follow up with UROLOGY within 1 week for continued evaluation.   Urology Clinic  [address] [place] [address] [address]  [phone]    RETURN PRECAUTIONS - Return to this Emergency Department if you experience more that 6 hours of pain that cannot be controlled by medication as it has been directed, bloody urine with clots that are preventing you from being able to urinate, or inability to urinate for unknown reason, severe abdominal/flank/back pain, uncontrollable excessive vomiting, any fevers, or any other concerns.       _____    PAIN MEDICATIONS -     TYLENOL / MOTRIN -    For symptoms take Motrin and tylenol - THESE MEDICATIONS DO NOT REQUIRE A PRESCRIPTION AND CAN BE PURCHASED IN ANY PHARMACY. Take motrin 600mg every 6 hours as needed, take with food - discontinue use if you notice abdominal pain, blood in stool or black stools. AND / OR... Take tylenol as needed - 650mg every 6 hours as needed, do not exceed 4000mg in 24 hours. You can take one medication or the other. Or, if one medication alone does not seem to be working, you can actually take both tylenol and motrin together and that is acceptable / safe but you should alternate the medications so you are taking something every few hours; consider the following regimen - Take one medication every 3 hours. 7am motrin with breakfast, 10am tylenol, 1pm motirn with lunch, 4pm tylenol, 7pm motrin with dinner, 10pm tylneol before bed.    OXYCODONE -   Take Oxycodone, 1 tab every 6-8 hours as needed for severe pain. Beware that this medication can cause drowsiness. Do not drive a car or operate heavy machinery while taking medication.

## 2022-11-07 NOTE — PROGRESS NOTES
"  Assessment & Plan     Encounter to establish care      Goiter  Stable continue current plan   Should continue with yearly TSH and as needed     MAURA (generalized anxiety disorder)  Plan to start low dose wellbutrin for anxiety and depression.  She does not want to start any medication that may cause her to gain weight.  Can also try using hydroxyzines as needed for anxiety.  Can consider Buspar in the future   - buPROPion (WELLBUTRIN) 75 MG tablet; Take 1 tablet (75 mg) by mouth 2 times daily  - hydrOXYzine (ATARAX) 25 MG tablet; Take 1 tablet (25 mg) by mouth 3 times daily as needed for anxiety         BMI:   Estimated body mass index is 29.76 kg/m  as calculated from the following:    Height as of this encounter: 1.702 m (5' 7\").    Weight as of this encounter: 86.2 kg (190 lb).   Weight management plan: Discussed healthy diet and exercise guidelines    See Patient Instructions    Return in about 4 weeks (around 12/6/2022).    REYNA Armenta [facility]    Subjective   Sheridan is a 42 year old accompanied by her self, presenting for the following health issues:  Establish Care and Thyroid Disease      HPI     Establish Care    Hypothyroidism Follow-up  Levothyroxine 75 mg aliyah  History of goitr     Since last visit, patient describes the following symptoms: anxiety  TSH   Date Value Ref Range Status   03/14/2022 1.06 0.40 - 4.00 mU/L Final   02/15/2021 1.77 0.40 - 4.00 mU/L Final           Anxiety  Has had increased anxiety and stress this past year.  Her daughters both have anxiety.  Sheridan was recently , working on getting a new job.  She is concerned about her daughter and is feeling overwhelmed. She does drink wine to decreased stress.    Previous medication Effexor - she went through withdrawal even with tapering off   Counseling with Pravin Miller in [place]         Review of Systems   CONSTITUTIONAL: NEGATIVE for fever, chills, change in " "weight  INTEGUMENTARY/SKIN: NEGATIVE for worrisome rashes, moles or lesions  EYES: NEGATIVE for vision changes or irritation  RESP: NEGATIVE for significant cough or SOB  CV: NEGATIVE for chest pain, palpitations or peripheral edema  GI: NEGATIVE for nausea, abdominal pain, heartburn, or change in bowel habits  : denies dysuria   MUSCULOSKELETAL: NEGATIVE for significant arthralgias or myalgia  NEURO: NEGATIVE for weakness, dizziness or paresthesias  ENDOCRINE: Hx thyroid disease  PSYCHIATRIC: HX anxiety and HX depression      Objective    /80   Pulse 60   Temp 98.9  F (37.2  C) (Tympanic)   Ht 1.702 m (5' 7\")   Wt 86.2 kg (190 lb)   SpO2 99%   BMI 29.76 kg/m    Body mass index is 29.76 kg/m .  Physical Exam   GENERAL: healthy, alert and no distress  NECK: no adenopathy, no asymmetry, masses, or scars and thyroid normal to palpation  RESP: lungs clear to auscultation - no rales, rhonchi or wheezes  CV: regular rate and rhythm, normal S1 S2, no S3 or S4, no murmur, click or rub, no peripheral edema and peripheral pulses strong  ABDOMEN: soft, nontender, no hepatosplenomegaly, no masses and bowel sounds normal  PSYCH: mentation appears normal and anxious    Reviewed previous labs               "

## 2022-11-08 ENCOUNTER — OFFICE VISIT (OUTPATIENT)
Dept: FAMILY MEDICINE | Facility: OTHER | Age: 42
End: 2022-11-08
Attending: NURSE PRACTITIONER
Payer: COMMERCIAL

## 2022-11-08 VITALS
HEIGHT: 67 IN | HEART RATE: 60 BPM | SYSTOLIC BLOOD PRESSURE: 130 MMHG | OXYGEN SATURATION: 99 % | TEMPERATURE: 98.9 F | WEIGHT: 190 LBS | DIASTOLIC BLOOD PRESSURE: 80 MMHG | BODY MASS INDEX: 29.82 KG/M2

## 2022-11-08 DIAGNOSIS — Z76.89 ENCOUNTER TO ESTABLISH CARE: Primary | ICD-10-CM

## 2022-11-08 DIAGNOSIS — F41.1 GAD (GENERALIZED ANXIETY DISORDER): ICD-10-CM

## 2022-11-08 DIAGNOSIS — E04.9 GOITER: ICD-10-CM

## 2022-11-08 PROCEDURE — 99214 OFFICE O/P EST MOD 30 MIN: CPT | Performed by: NURSE PRACTITIONER

## 2022-11-08 RX ORDER — HYDROXYZINE HYDROCHLORIDE 25 MG/1
25 TABLET, FILM COATED ORAL 3 TIMES DAILY PRN
Qty: 30 TABLET | Refills: 3 | Status: SHIPPED | OUTPATIENT
Start: 2022-11-08 | End: 2024-06-26

## 2022-11-08 RX ORDER — BUPROPION HYDROCHLORIDE 75 MG/1
75 TABLET ORAL 2 TIMES DAILY
Qty: 60 TABLET | Refills: 1 | Status: SHIPPED | OUTPATIENT
Start: 2022-11-08 | End: 2023-06-14

## 2022-11-08 ASSESSMENT — PATIENT HEALTH QUESTIONNAIRE - PHQ9: SUM OF ALL RESPONSES TO PHQ QUESTIONS 1-9: 8

## 2022-11-08 ASSESSMENT — ANXIETY QUESTIONNAIRES
2. NOT BEING ABLE TO STOP OR CONTROL WORRYING: NOT AT ALL
GAD7 TOTAL SCORE: 5
1. FEELING NERVOUS, ANXIOUS, OR ON EDGE: MORE THAN HALF THE DAYS
IF YOU CHECKED OFF ANY PROBLEMS ON THIS QUESTIONNAIRE, HOW DIFFICULT HAVE THESE PROBLEMS MADE IT FOR YOU TO DO YOUR WORK, TAKE CARE OF THINGS AT HOME, OR GET ALONG WITH OTHER PEOPLE: NOT DIFFICULT AT ALL
7. FEELING AFRAID AS IF SOMETHING AWFUL MIGHT HAPPEN: SEVERAL DAYS
6. BECOMING EASILY ANNOYED OR IRRITABLE: SEVERAL DAYS
5. BEING SO RESTLESS THAT IT IS HARD TO SIT STILL: NOT AT ALL
3. WORRYING TOO MUCH ABOUT DIFFERENT THINGS: SEVERAL DAYS
GAD7 TOTAL SCORE: 5
4. TROUBLE RELAXING: NOT AT ALL

## 2022-11-08 NOTE — NURSING NOTE
"Chief Complaint   Patient presents with     Establish Care     Thyroid Disease       Initial /80   Pulse 60   Temp 98.9  F (37.2  C) (Tympanic)   Ht 1.702 m (5' 7\")   Wt 86.2 kg (190 lb)   SpO2 99%   BMI 29.76 kg/m   Estimated body mass index is 29.76 kg/m  as calculated from the following:    Height as of this encounter: 1.702 m (5' 7\").    Weight as of this encounter: 86.2 kg (190 lb).  Medication Reconciliation: complete  Tiffany Murphy MA  "

## 2022-11-08 NOTE — PATIENT INSTRUCTIONS
Treatment for anxiety:    -meditation   APPS: (suggested by the ADA) can download on IPhone and Android    -Calm   -Headspace   -Insight Timer  -yoga  -journaling  -relaxation breathing exercises   -weighted blanket  -Epson Salt and lavender baths  -B vitamins (can help to reduce stress and anxiety)       Start the Wellbutrin once a day for 1-2 weeks then increase to 2 times a day

## 2023-02-21 DIAGNOSIS — E04.9 GOITER: ICD-10-CM

## 2023-02-24 NOTE — TELEPHONE ENCOUNTER
levothyroxine (SYNTHROID/LEVOTHROID) 75 MCG tablet     Last Written Prescription Date:  9/19/2022  Last Fill Quantity: 90,   # refills: 0  Last Office Visit: 11/08/2022

## 2023-02-28 RX ORDER — LEVOTHYROXINE SODIUM 75 UG/1
TABLET ORAL
Qty: 90 TABLET | Refills: 0 | Status: SHIPPED | OUTPATIENT
Start: 2023-02-28 | End: 2023-06-09

## 2023-04-29 ENCOUNTER — HEALTH MAINTENANCE LETTER (OUTPATIENT)
Age: 43
End: 2023-04-29

## 2023-05-02 ENCOUNTER — OFFICE VISIT (OUTPATIENT)
Dept: OBGYN | Facility: OTHER | Age: 43
End: 2023-05-02
Attending: NURSE PRACTITIONER
Payer: COMMERCIAL

## 2023-05-02 VITALS
RESPIRATION RATE: 16 BRPM | BODY MASS INDEX: 29.82 KG/M2 | WEIGHT: 190 LBS | HEIGHT: 67 IN | OXYGEN SATURATION: 99 % | DIASTOLIC BLOOD PRESSURE: 64 MMHG | HEART RATE: 80 BPM | SYSTOLIC BLOOD PRESSURE: 100 MMHG

## 2023-05-02 DIAGNOSIS — Z12.31 ENCOUNTER FOR SCREENING MAMMOGRAM FOR BREAST CANCER: ICD-10-CM

## 2023-05-02 DIAGNOSIS — Z76.89 ENCOUNTER FOR WEIGHT MANAGEMENT: ICD-10-CM

## 2023-05-02 DIAGNOSIS — Z12.4 SCREENING FOR MALIGNANT NEOPLASM OF CERVIX: ICD-10-CM

## 2023-05-02 DIAGNOSIS — Z01.419 WELL WOMAN EXAM WITH ROUTINE GYNECOLOGICAL EXAM: Primary | ICD-10-CM

## 2023-05-02 LAB
CHOLEST SERPL-MCNC: 160 MG/DL
ERYTHROCYTE [DISTWIDTH] IN BLOOD BY AUTOMATED COUNT: 12.6 % (ref 10–15)
FASTING STATUS PATIENT QL REPORTED: YES
GLUCOSE SERPL-MCNC: 107 MG/DL (ref 70–99)
HCT VFR BLD AUTO: 44 % (ref 35–47)
HDLC SERPL-MCNC: 59 MG/DL
HGB BLD-MCNC: 15.3 G/DL (ref 11.7–15.7)
LDLC SERPL CALC-MCNC: 91 MG/DL
MCH RBC QN AUTO: 29.7 PG (ref 26.5–33)
MCHC RBC AUTO-ENTMCNC: 34.8 G/DL (ref 31.5–36.5)
MCV RBC AUTO: 85 FL (ref 78–100)
NONHDLC SERPL-MCNC: 101 MG/DL
PLATELET # BLD AUTO: 232 10E3/UL (ref 150–450)
RBC # BLD AUTO: 5.15 10E6/UL (ref 3.8–5.2)
T4 FREE SERPL-MCNC: 1.48 NG/DL (ref 0.9–1.7)
TRIGL SERPL-MCNC: 49 MG/DL
TSH SERPL DL<=0.005 MIU/L-ACNC: 1.8 UIU/ML (ref 0.3–4.2)
WBC # BLD AUTO: 5.5 10E3/UL (ref 4–11)

## 2023-05-02 PROCEDURE — 85027 COMPLETE CBC AUTOMATED: CPT | Performed by: NURSE PRACTITIONER

## 2023-05-02 PROCEDURE — 99396 PREV VISIT EST AGE 40-64: CPT | Performed by: NURSE PRACTITIONER

## 2023-05-02 PROCEDURE — 87624 HPV HI-RISK TYP POOLED RSLT: CPT | Performed by: NURSE PRACTITIONER

## 2023-05-02 PROCEDURE — 82306 VITAMIN D 25 HYDROXY: CPT | Performed by: NURSE PRACTITIONER

## 2023-05-02 PROCEDURE — 84439 ASSAY OF FREE THYROXINE: CPT | Performed by: NURSE PRACTITIONER

## 2023-05-02 PROCEDURE — 80061 LIPID PANEL: CPT | Performed by: NURSE PRACTITIONER

## 2023-05-02 PROCEDURE — G0123 SCREEN CERV/VAG THIN LAYER: HCPCS | Performed by: NURSE PRACTITIONER

## 2023-05-02 PROCEDURE — 36415 COLL VENOUS BLD VENIPUNCTURE: CPT | Performed by: NURSE PRACTITIONER

## 2023-05-02 PROCEDURE — 82947 ASSAY GLUCOSE BLOOD QUANT: CPT | Performed by: NURSE PRACTITIONER

## 2023-05-02 PROCEDURE — 84443 ASSAY THYROID STIM HORMONE: CPT | Performed by: NURSE PRACTITIONER

## 2023-05-02 ASSESSMENT — ANXIETY QUESTIONNAIRES
2. NOT BEING ABLE TO STOP OR CONTROL WORRYING: SEVERAL DAYS
3. WORRYING TOO MUCH ABOUT DIFFERENT THINGS: SEVERAL DAYS
7. FEELING AFRAID AS IF SOMETHING AWFUL MIGHT HAPPEN: SEVERAL DAYS
1. FEELING NERVOUS, ANXIOUS, OR ON EDGE: SEVERAL DAYS
6. BECOMING EASILY ANNOYED OR IRRITABLE: NOT AT ALL
GAD7 TOTAL SCORE: 4
3. WORRYING TOO MUCH ABOUT DIFFERENT THINGS: SEVERAL DAYS
5. BEING SO RESTLESS THAT IT IS HARD TO SIT STILL: NOT AT ALL
5. BEING SO RESTLESS THAT IT IS HARD TO SIT STILL: NOT AT ALL
GAD7 TOTAL SCORE: 4
6. BECOMING EASILY ANNOYED OR IRRITABLE: NOT AT ALL
1. FEELING NERVOUS, ANXIOUS, OR ON EDGE: SEVERAL DAYS
IF YOU CHECKED OFF ANY PROBLEMS ON THIS QUESTIONNAIRE, HOW DIFFICULT HAVE THESE PROBLEMS MADE IT FOR YOU TO DO YOUR WORK, TAKE CARE OF THINGS AT HOME, OR GET ALONG WITH OTHER PEOPLE: SOMEWHAT DIFFICULT
GAD7 TOTAL SCORE: 4
2. NOT BEING ABLE TO STOP OR CONTROL WORRYING: SEVERAL DAYS
7. FEELING AFRAID AS IF SOMETHING AWFUL MIGHT HAPPEN: SEVERAL DAYS
IF YOU CHECKED OFF ANY PROBLEMS ON THIS QUESTIONNAIRE, HOW DIFFICULT HAVE THESE PROBLEMS MADE IT FOR YOU TO DO YOUR WORK, TAKE CARE OF THINGS AT HOME, OR GET ALONG WITH OTHER PEOPLE: SOMEWHAT DIFFICULT

## 2023-05-02 ASSESSMENT — PAIN SCALES - GENERAL: PAINLEVEL: NO PAIN (0)

## 2023-05-02 ASSESSMENT — PATIENT HEALTH QUESTIONNAIRE - PHQ9
5. POOR APPETITE OR OVEREATING: NOT AT ALL
SUM OF ALL RESPONSES TO PHQ QUESTIONS 1-9: 7
5. POOR APPETITE OR OVEREATING: NOT AT ALL

## 2023-05-02 NOTE — PROGRESS NOTES
"S: Sheridan Rodriges is a 42 year  old female who presents for her annual gynecologic exam. Remainder of her care is through PCP, Chelsie Delcid. The following concerns were addressed at today's visit.     1. Mammogram, last completed 2022: negative. Due today. No breast lumps, tenderness, rashes, nipple drainage/discharge, or skin dimpling.     2. Cytology and HPV completed 2022: both negative. Hx of ascus, LSIL,  RC II (had LEEP) and HPV (other). Family history of uterine cancer. LMP: Irregular spotting since Mirena IUD insertion, placed 2022. Patient is sexually active, denies STI screening. Denies dyspareunia, vaginal dryness, irritation, odor, or abnormal discharge.     3. Anxiety/Depression: Managed by PCP. Last visit with Chelsie 3 months ago, prescribed wellbutrin but has not taken yet. Does not want to take medication with drinking wine. Denies stress at work. Reports more stress at home. Recommended patient discuss alternative medication options with PCP.    PHQ-9: 7   MAURA 7: 4    4. Weight: Patient reports \"slow\" weight gain over the past 3 years and \"heaviest\" she has been. Interested in medication management. Family history significant for hyperlipidemia (mother). Not currently exercising daily, but reports being active.     5. Skin lesion on forehead, been there for 6 months. Red, scaly, will bleed if picked. Reports history of tanning. Currently uses sunscreen.     Children are 17 and 15. Smoking history, denies recent tobacco use. Drinks a couple glasses of wine 2-3 days/week. Denies menopausal symptoms such as hot flashes, night sweats, or vaginal dryness.     PCP: Chelsie Delcid    PMHx:   Past Medical History:   Diagnosis Date     Anxiety state, unspecified 2007     Chronic tonsillitis 04/15/2015     RC II (cervical intraepithelial neoplasia II) 2016    LEEP=cervicitis only     Galactorrhea not associated with childbirth 05/10/2005     Goiter 2014     " High risk HPV infection 2016    other     Mastodynia 2014     Multiple joint pain 2018     Papanicolaou smear of cervix with atypical squamous cells of undetermined significance (ASC-US) 2017    Neg hr hpv     Papanicolaou smear of cervix with low grade squamous intraepithelial lesion (LGSIL) 2016     Perineal lump 2020     Tonsillar hypertrophy 04/15/2015       PSHx:  Past Surgical History:   Procedure Laterality Date     ABDOMINOPLASTY       BUNIONECTOMY Right 10/29/2018    Procedure: RIGHT 1ST METATARSOPHALANGEAL JOINT CHEILECTOMY WITH IMPLANT;  Surgeon: Katty Schroeder DPM;  Location: [facility]     CHEILECTOMY Right 10/29/2018    Procedure: CHEILECTOMY;  Surgeon: Katty Schroeder DPM;  Location: [facility]     CONIZATION LEEP N/A 2016    Procedure: CONIZATION LEEP;  Surgeon: Chioma Wiggins MD;  Location: [facility]     LAPAROSCOPIC SALPINGECTOMY Bilateral 2017    Procedure: LAPAROSCOPIC SALPINGECTOMY;  LAPAROSCOPIC BILATERAL SALPINGECTOMY;  Surgeon: Kalin Hernandez MD;  Location: [facility]     MAMMOPLASTY AUGMENTATION BILATERAL         FHx:  I have reviewed this patient's family history and updated it with pertinent information if needed.  Family History   Problem Relation Age of Onset     Hypertension Father      Other - See Comments Father         lymphoma     Uterine Cancer Cousin        SHx:  Social History     Tobacco Use     Smoking status: Former     Types: Cigarettes     Quit date: 2002     Years since quittin.3     Smokeless tobacco: Never   Vaping Use     Vaping status: Never Used   Substance Use Topics     Alcohol use: Yes     Comment: socially     Drug use: No     Meciations:  Current Outpatient Medications   Medication     hydrOXYzine (ATARAX) 25 MG tablet     levothyroxine (SYNTHROID/LEVOTHROID) 75 MCG tablet     acyclovir (ZOVIRAX) 400 MG tablet     buPROPion (WELLBUTRIN) 75 MG tablet     Current Facility-Administered Medications   Medication      "levonorgestrel (MIRENA) 20 MCG/DAY IUD 20 mcg     Allergies:   Allergies   Allergen Reactions     Erythromycin Nausea and Vomiting     Erythromycin base     Penicillins Rash     ROS:  5 point ROS negative except as noted above in HPI, including Gen., Resp, CV, GI &  system review.    O:  /64 (BP Location: Left arm, Patient Position: Sitting, Cuff Size: Adult Regular)   Pulse 80   Resp 16   Ht 1.702 m (5' 7\")   Wt 86.2 kg (190 lb)   SpO2 99%   Breastfeeding Unknown   BMI 29.76 kg/m    Patient is alert, orientated x 3, and not in acute distress.   HEENT: Thyroid symmetrical. No thyromegaly.   CV: RRR. No murmurs, rubs, or gallops. No lower extremity edema.   Lungs: Clear to auscultation bilaterally.   Breasts: Symmetrical. No lumps or tenderness to palpation. No rashes, lesions, indentations, or nipple discharge. Surgical scars present on breasts bilaterally (augmentation).   GYN:   No adnexal tenderness or masses on palpation.   Vulva: No lesions or rashes. Hair distribution appropriate for adult female.   Vagina: Pink, moist, no abnormal discharge or bleeding. No lesions.   Cervix: IUD strings present. Pink, moist. No lesions, bleeding, or abnormal discharge.     Assessment/Plan:  (Z01.419) Well woman exam with routine gynecological exam  (primary encounter diagnosis)  Follow up annually for well woman exam. Remainder of care is through PCP.     (Z76.89) Encounter for weight management  Plan: CBC with platelets, Glucose, Vitamin D Deficiency, Lipid Profile (Chol, Trig, HDL, LDL calc), TSH, T4, free, Adult Comprehensive.Weight Management  Referral    (Z12.4) Screening for malignant neoplasm of cervix  Plan: A pap thin layer screen with  HPV - recommended age 30 - 65 years    (Z12.31) Encounter for screening mammogram for breast cancer  Plan: Mammogram ordered. Patient will be called to schedule.    Recommended patient schedule appointment with local dermatology office, [facility]. " No referral needed. If patient does need a referral or would like to see Dermatology provider with [facility], please call clinic and a referral will be placed.      Recommended daily exercise, a balanced and nutritious diet, and a daily multivitamin.   Lab results will be communicated with patient once complete. Prescriptions will be ordered if necessary once results are complete.     Follow up annually for well woman exam, sooner if questions or concerns.     Doreen HUSSEIN PA student, from [facility] performed the history of present illness, exam, and medical decision making of today's visit with preceptor guidance from Anne-Marie Cain CNP.

## 2023-05-03 LAB — DEPRECATED CALCIDIOL+CALCIFEROL SERPL-MC: 25 UG/L (ref 20–75)

## 2023-06-09 DIAGNOSIS — E04.9 GOITER: ICD-10-CM

## 2023-06-09 RX ORDER — LEVOTHYROXINE SODIUM 75 UG/1
TABLET ORAL
Qty: 90 TABLET | Refills: 0 | Status: SHIPPED | OUTPATIENT
Start: 2023-06-09 | End: 2023-09-21

## 2023-07-12 ENCOUNTER — ANCILLARY PROCEDURE (OUTPATIENT)
Dept: MAMMOGRAPHY | Facility: OTHER | Age: 43
End: 2023-07-12
Attending: NURSE PRACTITIONER
Payer: COMMERCIAL

## 2023-07-12 ENCOUNTER — TELEPHONE (OUTPATIENT)
Dept: MAMMOGRAPHY | Facility: OTHER | Age: 43
End: 2023-07-12

## 2023-07-12 DIAGNOSIS — Z12.31 VISIT FOR SCREENING MAMMOGRAM: ICD-10-CM

## 2023-07-12 PROCEDURE — 77063 BREAST TOMOSYNTHESIS BI: CPT | Mod: TC | Performed by: RADIOLOGY

## 2023-07-12 PROCEDURE — 77067 SCR MAMMO BI INCL CAD: CPT | Mod: TC | Performed by: RADIOLOGY

## 2023-07-31 ENCOUNTER — OFFICE VISIT (OUTPATIENT)
Dept: FAMILY MEDICINE | Facility: OTHER | Age: 43
End: 2023-07-31
Attending: NURSE PRACTITIONER
Payer: COMMERCIAL

## 2023-07-31 VITALS
BODY MASS INDEX: 30.38 KG/M2 | HEART RATE: 75 BPM | OXYGEN SATURATION: 98 % | TEMPERATURE: 98.7 F | WEIGHT: 194 LBS | SYSTOLIC BLOOD PRESSURE: 132 MMHG | DIASTOLIC BLOOD PRESSURE: 92 MMHG

## 2023-07-31 DIAGNOSIS — F32.A ANXIETY AND DEPRESSION: Primary | ICD-10-CM

## 2023-07-31 DIAGNOSIS — F41.9 ANXIETY AND DEPRESSION: Primary | ICD-10-CM

## 2023-07-31 PROCEDURE — 99214 OFFICE O/P EST MOD 30 MIN: CPT | Performed by: NURSE PRACTITIONER

## 2023-07-31 RX ORDER — BUPROPION HYDROCHLORIDE 150 MG/1
150 TABLET ORAL EVERY MORNING
Qty: 30 TABLET | Refills: 3 | Status: SHIPPED | OUTPATIENT
Start: 2023-07-31 | End: 2023-09-01

## 2023-07-31 ASSESSMENT — PATIENT HEALTH QUESTIONNAIRE - PHQ9
SUM OF ALL RESPONSES TO PHQ QUESTIONS 1-9: 7
SUM OF ALL RESPONSES TO PHQ QUESTIONS 1-9: 7
10. IF YOU CHECKED OFF ANY PROBLEMS, HOW DIFFICULT HAVE THESE PROBLEMS MADE IT FOR YOU TO DO YOUR WORK, TAKE CARE OF THINGS AT HOME, OR GET ALONG WITH OTHER PEOPLE: NOT DIFFICULT AT ALL

## 2023-07-31 ASSESSMENT — PAIN SCALES - GENERAL: PAINLEVEL: NO PAIN (0)

## 2023-07-31 ASSESSMENT — ANXIETY QUESTIONNAIRES
GAD7 TOTAL SCORE: 14
8. IF YOU CHECKED OFF ANY PROBLEMS, HOW DIFFICULT HAVE THESE MADE IT FOR YOU TO DO YOUR WORK, TAKE CARE OF THINGS AT HOME, OR GET ALONG WITH OTHER PEOPLE?: SOMEWHAT DIFFICULT
2. NOT BEING ABLE TO STOP OR CONTROL WORRYING: NEARLY EVERY DAY
6. BECOMING EASILY ANNOYED OR IRRITABLE: SEVERAL DAYS
5. BEING SO RESTLESS THAT IT IS HARD TO SIT STILL: NOT AT ALL
3. WORRYING TOO MUCH ABOUT DIFFERENT THINGS: NEARLY EVERY DAY
GAD7 TOTAL SCORE: 14
1. FEELING NERVOUS, ANXIOUS, OR ON EDGE: NEARLY EVERY DAY
7. FEELING AFRAID AS IF SOMETHING AWFUL MIGHT HAPPEN: NEARLY EVERY DAY
7. FEELING AFRAID AS IF SOMETHING AWFUL MIGHT HAPPEN: NEARLY EVERY DAY
4. TROUBLE RELAXING: SEVERAL DAYS
IF YOU CHECKED OFF ANY PROBLEMS ON THIS QUESTIONNAIRE, HOW DIFFICULT HAVE THESE PROBLEMS MADE IT FOR YOU TO DO YOUR WORK, TAKE CARE OF THINGS AT HOME, OR GET ALONG WITH OTHER PEOPLE: SOMEWHAT DIFFICULT
GAD7 TOTAL SCORE: 14

## 2023-07-31 NOTE — PATIENT INSTRUCTIONS
Treatment for anxiety:    -meditation   APPS: (suggested by the ADA) can download on IPhone and Android    -Calm   -Headspace   -Insight Timer  -yoga  -journaling  -relaxation breathing exercises   -weighted blanket  -Epson Salt and lavender baths  -B vitamins (can help to reduce stress and anxiety)    Can try L-theanine 100 mg 2 times a day (OTC vitamin for anxiety)

## 2023-07-31 NOTE — PROGRESS NOTES
"  Assessment & Plan     Anxiety and depression  Provided support and guidance.  Sheridan works full time and is not able to get into counseling.  She is having increased anxiety and depression with one of her daughters moving out and not talking to her.  Plan to start wellbutrin and consider taking L-theanine 2 times a day.  Follow up in a month   - buPROPion (WELLBUTRIN XL) 150 MG 24 hr tablet; Take 1 tablet (150 mg) by mouth every morning      I spent a total of 32 minutes on the day of the visit.   Time spent by me doing chart review, history and exam, documentation and further activities per the note       BMI:   Estimated body mass index is 30.38 kg/m  as calculated from the following:    Height as of 5/2/23: 1.702 m (5' 7\").    Weight as of this encounter: 88 kg (194 lb).   Weight management plan: Discussed healthy diet and exercise guidelines    See Patient Instructions    Return for depression, anxiety.    REYNA Armenta CNP  [facility] [place]    Subjective   Sheridan is a 42 year old, presenting for the following health issues:  Depression        7/31/2023     3:26 PM   Additional Questions   Roomed by Rosendo Piper   Accompanied by None         7/31/2023     3:26 PM   Patient Reported Additional Medications   Patient reports taking the following new medications None       HPI     Depression and Anxiety Follow-Up  How are you doing with your depression since your last visit? No change  How are you doing with your anxiety since your last visit?  No change  Are you having other symptoms that might be associated with depression or anxiety? No  Have you had a significant life event? No   Do you have any concerns with your use of alcohol or other drugs? No  Previously prescribed Wellbutrin for anxiety and depression.  She did not start due to concerns for lowering seizure threshold if drinking alcohol. She does like to drink wine on occasion.    Social History     Tobacco Use    " Smoking status: Former     Types: Cigarettes     Quit date: 2002     Years since quittin.5    Smokeless tobacco: Never   Vaping Use    Vaping Use: Never used   Substance Use Topics    Alcohol use: Yes     Comment: socially    Drug use: No         2023    10:00 AM 2023     3:10 PM 2023     3:20 PM   PHQ   PHQ-9 Total Score 7 7 7   Q9: Thoughts of better off dead/self-harm past 2 weeks Not at all Not at all Not at all         2023    10:00 AM 2023     3:10 PM 2023     3:21 PM   MAURA-7 SCORE   Total Score   14 (moderate anxiety)   Total Score 4 4 14         2023     3:20 PM   Last PHQ-9   1.  Little interest or pleasure in doing things 1   2.  Feeling down, depressed, or hopeless 1   3.  Trouble falling or staying asleep, or sleeping too much 0   4.  Feeling tired or having little energy 1   5.  Poor appetite or overeating 1   6.  Feeling bad about yourself 3   7.  Trouble concentrating 0   8.  Moving slowly or restless 0   Q9: Thoughts of better off dead/self-harm past 2 weeks 0   PHQ-9 Total Score 7         2023     3:21 PM   MAURA-7    1. Feeling nervous, anxious, or on edge 3   2. Not being able to stop or control worrying 3   3. Worrying too much about different things 3   4. Trouble relaxing 1   5. Being so restless that it is hard to sit still 0   6. Becoming easily annoyed or irritable 1   7. Feeling afraid, as if something awful might happen 3   MAURA-7 Total Score 14   If you checked any problems, how difficult have they made it for you to do your work, take care of things at home, or get along with other people? Somewhat difficult       Suicide Assessment Five-step Evaluation and Treatment (SAFE-T)      Review of Systems   CONSTITUTIONAL: NEGATIVE for fever, chills, change in weight  INTEGUMENTARY/SKIN: NEGATIVE for worrisome rashes, moles or lesions  RESP: NEGATIVE for significant cough or SOB  CV: NEGATIVE for chest pain, palpitations or peripheral  edema  PSYCHIATRIC: increased anxiety and depression       Objective    BP (!) 132/92 (BP Location: Right arm, Patient Position: Sitting, Cuff Size: Adult Regular)   Pulse 75   Temp 98.7  F (37.1  C) (Tympanic)   Wt 88 kg (194 lb)   SpO2 98%   BMI 30.38 kg/m    Body mass index is 30.38 kg/m .  Physical Exam   GENERAL: alert  RESP: lungs clear to auscultation - no rales, rhonchi or wheezes  CV: regular rate and rhythm, normal S1 S2, no S3 or S4, no murmur, click or rub, no peripheral edema and peripheral pulses strong  ABDOMEN: soft, nontender, no hepatosplenomegaly, no masses and bowel sounds normal  PSYCH: mentation appears normal, tearful, and anxious    Office Visit on 05/02/2023   Component Date Value Ref Range Status    Interpretation 05/02/2023 Negative for Intraepithelial Lesion or Malignancy (NILM)    Final    Comment 05/02/2023    Final                    Value:This result contains rich text formatting which cannot be displayed here.    Specimen Adequacy 05/02/2023 Satisfactory for evaluation, endocervical/transformation zone component absent   Final    Clinical Information 05/02/2023    Final                    Value:This result contains rich text formatting which cannot be displayed here.    Reflex Testing 05/02/2023 Yes regardless of result   Final    Previous Abnormal? 05/02/2023    Final                    Value:This result contains rich text formatting which cannot be displayed here.    Performing Labs 05/02/2023    Final                    Value:This result contains rich text formatting which cannot be displayed here.    Free T4 05/02/2023 1.48  0.90 - 1.70 ng/dL Final    TSH 05/02/2023 1.80  0.30 - 4.20 uIU/mL Final    Cholesterol 05/02/2023 160  <200 mg/dL Final    Triglycerides 05/02/2023 49  <150 mg/dL Final    Direct Measure HDL 05/02/2023 59  >=50 mg/dL Final    LDL Cholesterol Calculated 05/02/2023 91  <=100 mg/dL Final    Non HDL Cholesterol 05/02/2023 101  <130 mg/dL Final    Vitamin D,  Total (25-Hydroxy) 05/02/2023 25  20 - 75 ug/L Final    Glucose 05/02/2023 107 (H)  70 - 99 mg/dL Final    Patient Fasting > 8hrs? 05/02/2023 Yes   Final    WBC Count 05/02/2023 5.5  4.0 - 11.0 10e3/uL Final    RBC Count 05/02/2023 5.15  3.80 - 5.20 10e6/uL Final    Hemoglobin 05/02/2023 15.3  11.7 - 15.7 g/dL Final    Hematocrit 05/02/2023 44.0  35.0 - 47.0 % Final    MCV 05/02/2023 85  78 - 100 fL Final    MCH 05/02/2023 29.7  26.5 - 33.0 pg Final    MCHC 05/02/2023 34.8  31.5 - 36.5 g/dL Final    RDW 05/02/2023 12.6  10.0 - 15.0 % Final    Platelet Count 05/02/2023 232  150 - 450 10e3/uL Final    Other HR HPV 05/02/2023 Negative  Negative Final    HPV16 DNA 05/02/2023 Negative  Negative Final    HPV18 DNA 05/02/2023 Negative  Negative Final    FINAL DIAGNOSIS 05/02/2023    Final                    Value:This result contains rich text formatting which cannot be displayed here.                 Answers submitted by the patient for this visit:  Patient Health Questionnaire (Submitted on 7/31/2023)  If you checked off any problems, how difficult have these problems made it for you to do your work, take care of things at home, or get along with other people?: Not difficult at all  PHQ9 TOTAL SCORE: 7  MAURA-7 (Submitted on 7/31/2023)  MAURA 7 TOTAL SCORE: 14

## 2023-08-28 NOTE — PROGRESS NOTES
Sheridan is a 42 year old who is being evaluated via a billable telephone visit.      What phone number would you like to be contacted at? [phone]  How would you like to obtain your AVS? Lakesha    Distant Location (provider location):  On-site    Assessment & Plan     Anxiety and depression  Increase Wellbutrin, did have some relief, but symptoms are returning   Consider adding L-theanine too   - buPROPion (WELLBUTRIN XL) 150 MG 24 hr tablet; Take 2 tablets (300 mg) by mouth every morning         See Patient Instructions    Return in about 3 months (around 2023).    REYNA Armenta CNP  [facility] [place]    Subjective   Sheridan is a 42 year old, presenting for the following health issues:  No chief complaint on file.        2023     3:26 PM   Additional Questions   Roomed by Rosendo Piper   Accompanied by None       HPI     Depression and Anxiety Follow-Up  How are you doing with your depression since your last visit? Improved   How are you doing with your anxiety since your last visit?  No change  Are you having other symptoms that might be associated with depression or anxiety? No  Have you had a significant life event? No   Do you have any concerns with your use of alcohol or other drugs? No  Wellbutrin - she did start  She is considering starting L. Theanine   Wellbutrin helped initially, but symptoms returned   She continues to have stress.  He daughter will randomly ask for things, but does not want to see her at this time.      Social History     Tobacco Use    Smoking status: Former     Types: Cigarettes     Quit date: 2002     Years since quittin.6    Smokeless tobacco: Never   Vaping Use    Vaping Use: Never used   Substance Use Topics    Alcohol use: Yes     Comment: socially    Drug use: No         2023     3:10 PM 2023     3:20 PM 2023     8:00 AM   PHQ   PHQ-9 Total Score 7 7 2   Q9: Thoughts of better off dead/self-harm past 2 weeks  Not at all Not at all Not at all         5/2/2023     3:10 PM 7/31/2023     3:21 PM 9/1/2023     8:00 AM   MAURA-7 SCORE   Total Score  14 (moderate anxiety)    Total Score 4 14 5         9/1/2023     8:00 AM   Last PHQ-9   1.  Little interest or pleasure in doing things 0   2.  Feeling down, depressed, or hopeless 1   3.  Trouble falling or staying asleep, or sleeping too much 0   4.  Feeling tired or having little energy 0   5.  Poor appetite or overeating 0   6.  Feeling bad about yourself 1   7.  Trouble concentrating 0   8.  Moving slowly or restless 0   Q9: Thoughts of better off dead/self-harm past 2 weeks 0   PHQ-9 Total Score 2         9/1/2023     8:00 AM   MAURA-7    1. Feeling nervous, anxious, or on edge 1   2. Not being able to stop or control worrying 1   3. Worrying too much about different things 1   4. Trouble relaxing 0   5. Being so restless that it is hard to sit still 0   6. Becoming easily annoyed or irritable 1   7. Feeling afraid, as if something awful might happen 1   MAURA-7 Total Score 5       Suicide Assessment Five-step Evaluation and Treatment (SAFE-T)        Review of Systems   CONSTITUTIONAL: NEGATIVE for fever, chills, change in weight  RESP: NEGATIVE for significant cough or SOB  CV: NEGATIVE for chest pain, palpitations or peripheral edema  PSYCHIATRIC: some stress and anxiety       Objective           Vitals:  No vitals were obtained today due to virtual visit.    Physical Exam   alert and no distress  PSYCH: Alert and oriented times 3; coherent speech, normal   rate and volume, able to articulate logical thoughts, able   to abstract reason, no tangential thoughts, no hallucinations   or delusions  Her affect is normal and pleasant  RESP: No cough, no audible wheezing, able to talk in full sentences  Remainder of exam unable to be completed due to telephone visits    Office Visit on 05/02/2023   Component Date Value Ref Range Status    Interpretation 05/02/2023 Negative for  Intraepithelial Lesion or Malignancy (NILM)    Final    Comment 05/02/2023    Final                    Value:This result contains rich text formatting which cannot be displayed here.    Specimen Adequacy 05/02/2023 Satisfactory for evaluation, endocervical/transformation zone component absent   Final    Clinical Information 05/02/2023    Final                    Value:This result contains rich text formatting which cannot be displayed here.    Reflex Testing 05/02/2023 Yes regardless of result   Final    Previous Abnormal? 05/02/2023    Final                    Value:This result contains rich text formatting which cannot be displayed here.    Performing Labs 05/02/2023    Final                    Value:This result contains rich text formatting which cannot be displayed here.    Free T4 05/02/2023 1.48  0.90 - 1.70 ng/dL Final    TSH 05/02/2023 1.80  0.30 - 4.20 uIU/mL Final    Cholesterol 05/02/2023 160  <200 mg/dL Final    Triglycerides 05/02/2023 49  <150 mg/dL Final    Direct Measure HDL 05/02/2023 59  >=50 mg/dL Final    LDL Cholesterol Calculated 05/02/2023 91  <=100 mg/dL Final    Non HDL Cholesterol 05/02/2023 101  <130 mg/dL Final    Vitamin D, Total (25-Hydroxy) 05/02/2023 25  20 - 75 ug/L Final    Glucose 05/02/2023 107 (H)  70 - 99 mg/dL Final    Patient Fasting > 8hrs? 05/02/2023 Yes   Final    WBC Count 05/02/2023 5.5  4.0 - 11.0 10e3/uL Final    RBC Count 05/02/2023 5.15  3.80 - 5.20 10e6/uL Final    Hemoglobin 05/02/2023 15.3  11.7 - 15.7 g/dL Final    Hematocrit 05/02/2023 44.0  35.0 - 47.0 % Final    MCV 05/02/2023 85  78 - 100 fL Final    MCH 05/02/2023 29.7  26.5 - 33.0 pg Final    MCHC 05/02/2023 34.8  31.5 - 36.5 g/dL Final    RDW 05/02/2023 12.6  10.0 - 15.0 % Final    Platelet Count 05/02/2023 232  150 - 450 10e3/uL Final    Other HR HPV 05/02/2023 Negative  Negative Final    HPV16 DNA 05/02/2023 Negative  Negative Final    HPV18 DNA 05/02/2023 Negative  Negative Final    FINAL DIAGNOSIS  05/02/2023    Final                    Value:This result contains rich text formatting which cannot be displayed here.               Phone call duration: 9 minutes

## 2023-09-01 ENCOUNTER — VIRTUAL VISIT (OUTPATIENT)
Dept: FAMILY MEDICINE | Facility: OTHER | Age: 43
End: 2023-09-01
Attending: NURSE PRACTITIONER
Payer: COMMERCIAL

## 2023-09-01 DIAGNOSIS — F41.9 ANXIETY AND DEPRESSION: Primary | ICD-10-CM

## 2023-09-01 DIAGNOSIS — F32.A ANXIETY AND DEPRESSION: Primary | ICD-10-CM

## 2023-09-01 PROCEDURE — 99213 OFFICE O/P EST LOW 20 MIN: CPT | Mod: 93 | Performed by: NURSE PRACTITIONER

## 2023-09-01 RX ORDER — BUPROPION HYDROCHLORIDE 150 MG/1
300 TABLET ORAL EVERY MORNING
Qty: 180 TABLET | Refills: 1 | Status: SHIPPED | OUTPATIENT
Start: 2023-09-01 | End: 2024-06-26

## 2023-09-01 ASSESSMENT — ANXIETY QUESTIONNAIRES
4. TROUBLE RELAXING: NOT AT ALL
1. FEELING NERVOUS, ANXIOUS, OR ON EDGE: SEVERAL DAYS
3. WORRYING TOO MUCH ABOUT DIFFERENT THINGS: SEVERAL DAYS
GAD7 TOTAL SCORE: 5
6. BECOMING EASILY ANNOYED OR IRRITABLE: SEVERAL DAYS
2. NOT BEING ABLE TO STOP OR CONTROL WORRYING: SEVERAL DAYS
5. BEING SO RESTLESS THAT IT IS HARD TO SIT STILL: NOT AT ALL
7. FEELING AFRAID AS IF SOMETHING AWFUL MIGHT HAPPEN: SEVERAL DAYS
GAD7 TOTAL SCORE: 5

## 2023-09-01 ASSESSMENT — PATIENT HEALTH QUESTIONNAIRE - PHQ9: SUM OF ALL RESPONSES TO PHQ QUESTIONS 1-9: 2

## 2023-09-01 NOTE — PATIENT INSTRUCTIONS
Increased Wellbutrin to 300 mg daily   Consider adding L-theanine     Follow up in 3 months, this can be a phone visit.    Treatment for anxiety:    -meditation   APPS: (suggested by the ADA) can download on IPhone and Android    -Calm   -Headspace   -Insight Timer  -yoga  -journaling  -relaxation breathing exercises   -weighted blanket  -Epson Salt and lavender baths  -B vitamins (can help to reduce stress and anxiety)    Can try L-theanine 100 mg 2 times a day (OTC vitamin for anxiety)

## 2023-09-21 DIAGNOSIS — E04.9 GOITER: ICD-10-CM

## 2023-09-21 RX ORDER — LEVOTHYROXINE SODIUM 75 UG/1
TABLET ORAL
Qty: 90 TABLET | Refills: 2 | Status: SHIPPED | OUTPATIENT
Start: 2023-09-21 | End: 2024-06-17

## 2023-10-31 ENCOUNTER — MYC MEDICAL ADVICE (OUTPATIENT)
Dept: FAMILY MEDICINE | Facility: OTHER | Age: 43
End: 2023-10-31

## 2023-10-31 NOTE — LETTER
November 1, 2023        Sheridan Rodriges  [address]  [address] [place] [id number]           To Whom It May Concern:     Sheridan Rodriges has been my patient for over a year now.  She has a history of anxiety with treatment.  She was having difficulty with increased anxiety when having to speak in a group or in front of people.  Treatment for her anxiety will be ongoing and possible indefinite         Sincerely,           Chelsie Delcid, REYNA CNP

## 2023-10-31 NOTE — LETTER
November 1, 2023      Sheridan Rodriges  [address]  [address] [place] [id number]        To Whom It May Concern:    Sheridan Rodriges has been my patient for over a year now.  She has a history of anxiety with treatment.  She was having difficulty with increased anxiety when having to speak in a group or in front of people.        Sincerely,        REYNA Armenta CNP

## 2023-10-31 NOTE — LETTER
November 6, 2023      Sheridan Rodriges  [address]  [address] [place] [id number]        To Whom It May Concern:    Sheridan Rodriges is under my care and was unable to perform her duties as a full time  due to the diagnosis of anxiety. .        Sincerely,        Chelsie Delcid, REYNA CNP

## 2023-11-01 NOTE — TELEPHONE ENCOUNTER
I pended another letter for you to sign, sounds like patient needs some more information added, such as a time frame. Not sure if you agree with her wording or not.

## 2023-12-08 ENCOUNTER — MYC MEDICAL ADVICE (OUTPATIENT)
Dept: FAMILY MEDICINE | Facility: OTHER | Age: 43
End: 2023-12-08

## 2023-12-08 DIAGNOSIS — F32.A ANXIETY AND DEPRESSION: Primary | ICD-10-CM

## 2023-12-08 DIAGNOSIS — F41.9 ANXIETY AND DEPRESSION: Primary | ICD-10-CM

## 2023-12-08 RX ORDER — BUPROPION HYDROCHLORIDE 300 MG/1
300 TABLET ORAL EVERY MORNING
Qty: 90 TABLET | Refills: 1 | Status: SHIPPED | OUTPATIENT
Start: 2023-12-08 | End: 2024-06-17

## 2023-12-08 NOTE — TELEPHONE ENCOUNTER
Please advise.    Pended up bupropion 300 mg per pt request.  Please review and sign is appropriate.

## 2024-05-31 ENCOUNTER — TELEPHONE (OUTPATIENT)
Dept: FAMILY MEDICINE | Facility: OTHER | Age: 44
End: 2024-05-31

## 2024-05-31 NOTE — TELEPHONE ENCOUNTER
Attempt # 1  Outcome: Talked with Patient    Comment: Spoke with patient in regard to scheduling a physical per quality list. Declined to schedule at this time.

## 2024-06-17 DIAGNOSIS — E04.9 GOITER: ICD-10-CM

## 2024-06-17 DIAGNOSIS — F41.9 ANXIETY AND DEPRESSION: ICD-10-CM

## 2024-06-17 DIAGNOSIS — F32.A ANXIETY AND DEPRESSION: ICD-10-CM

## 2024-06-17 RX ORDER — LEVOTHYROXINE SODIUM 75 UG/1
TABLET ORAL
Qty: 90 TABLET | Refills: 0 | Status: SHIPPED | OUTPATIENT
Start: 2024-06-17 | End: 2024-09-17

## 2024-06-17 RX ORDER — BUPROPION HYDROCHLORIDE 300 MG/1
300 TABLET ORAL EVERY MORNING
Qty: 90 TABLET | Refills: 1 | Status: SHIPPED | OUTPATIENT
Start: 2024-06-17 | End: 2024-09-17

## 2024-06-17 NOTE — TELEPHONE ENCOUNTER
Please call patient and schedule appointment for anxiety/depression follow up and lab appointment per provider.  Thanks

## 2024-06-17 NOTE — TELEPHONE ENCOUNTER
Wellbutrin  MG      Last Written Prescription Date:  12/08/23  Last Fill Quantity: 90,   # refills: 1  Last Office Visit: 09/01/23  Future Office visit:    Next 5 appointments (look out 90 days)      Jul 16, 2024  2:00 PM  (Arrive by 1:45 PM)  PHYSICAL with Anne-Marie Cain NP  [facility] ([facility] [address] [place] [address]  [phone]             Routing refill request to provider for review/approval because:  PHQ-9 score less than 5 in past 6 months    Please review last PHQ-9 score.     Recent (6 mo) or future (30 days) visit within the authorizing provider's specialty        Levothyroxine 75 MCG      Last Written Prescription Date:  09/21/23  Last Fill Quantity: 90,   # refills: 2  Last Office Visit: 09/01/23  Future Office visit:    Next 5 appointments (look out 90 days)      Jul 16, 2024  2:00 PM  (Arrive by 1:45 PM)  PHYSICAL with Anne-Marie Cain NP  [facility] ([facility] ) [address] [place] [address]  [phone]             Routing refill request to provider for review/approval because:  Medication indicated for associated diagnosis    Medication is associated with one or more of the following diagnoses:  Hypothyroidism  Thyroid stimulating hormone suppression therapy  Thyroid cancer    Normal TSH on file in past 12 months        Recent Labs   Lab Test 05/02/23  0956   TSH 1.80

## 2024-06-21 ASSESSMENT — ANXIETY QUESTIONNAIRES
1. FEELING NERVOUS, ANXIOUS, OR ON EDGE: SEVERAL DAYS
IF YOU CHECKED OFF ANY PROBLEMS ON THIS QUESTIONNAIRE, HOW DIFFICULT HAVE THESE PROBLEMS MADE IT FOR YOU TO DO YOUR WORK, TAKE CARE OF THINGS AT HOME, OR GET ALONG WITH OTHER PEOPLE: NOT DIFFICULT AT ALL
4. TROUBLE RELAXING: NOT AT ALL
6. BECOMING EASILY ANNOYED OR IRRITABLE: NOT AT ALL
7. FEELING AFRAID AS IF SOMETHING AWFUL MIGHT HAPPEN: SEVERAL DAYS
GAD7 TOTAL SCORE: 4
3. WORRYING TOO MUCH ABOUT DIFFERENT THINGS: SEVERAL DAYS
8. IF YOU CHECKED OFF ANY PROBLEMS, HOW DIFFICULT HAVE THESE MADE IT FOR YOU TO DO YOUR WORK, TAKE CARE OF THINGS AT HOME, OR GET ALONG WITH OTHER PEOPLE?: NOT DIFFICULT AT ALL
2. NOT BEING ABLE TO STOP OR CONTROL WORRYING: SEVERAL DAYS
5. BEING SO RESTLESS THAT IT IS HARD TO SIT STILL: NOT AT ALL
7. FEELING AFRAID AS IF SOMETHING AWFUL MIGHT HAPPEN: SEVERAL DAYS

## 2024-06-24 NOTE — PROGRESS NOTES
Sheridan is a 43 year old who is being evaluated via a billable video visit.    What phone number would you like to be contacted at? [phone]  How would you like to obtain your AVS? Verónicahart  {If patient encounters technical issues they should call [phone] :950827}    {PROVIDER CHARTING PREFERENCE:577418}    Subjective   Sheridan is a 43 year old, presenting for the following health issues:  No chief complaint on file.    History of Present Illness       Mental Health Follow-up:  Patient presents to follow-up on Depression & Anxiety.Patient's depression since last visit has been:  Medium  The patient is not having other symptoms associated with depression.  Patient's anxiety since last visit has been:  Medium  The patient is not having other symptoms associated with anxiety.  Any significant life events: No  Patient is feeling anxious or having panic attacks.  Patient has no concerns about alcohol or drug use.    She eats 2-3 servings of fruits and vegetables daily.She consumes 0 sweetened beverage(s) daily.She exercises with enough effort to increase her heart rate 10 to 19 minutes per day.  She exercises with enough effort to increase her heart rate 3 or less days per week. She is missing 1 dose(s) of medications per week.  She is not taking prescribed medications regularly due to remembering to take.       Colon screening ***      History of Goiter  Following with endocrine ***  Current treatment with levothyroxine 75 mcg daily   ***    Depression and Anxiety   How are you doing with your depression since your last visit? { :830252}  How are you doing with your anxiety since your last visit?  { :363946}  Are you having other symptoms that might be associated with depression or anxiety? { :292603}  Have you had a significant life event? { :121791}   Do you have any concerns with your use of alcohol or other drugs? { :365229}  Wellbutrin ***  Hydroxyzine ***    Social History     Tobacco Use    Smoking  "status: Former     Current packs/day: 0.00     Types: Cigarettes     Quit date: 2002     Years since quittin.4    Smokeless tobacco: Never   Vaping Use    Vaping status: Never Used   Substance Use Topics    Alcohol use: Yes     Comment: socially    Drug use: No         2023     3:10 PM 2023     3:20 PM 2023     8:00 AM   PHQ   PHQ-9 Total Score 7 7 2   Q9: Thoughts of better off dead/self-harm past 2 weeks Not at all Not at all Not at all         2023     3:21 PM 2023     8:00 AM 2024     9:35 AM   MAURA-7 SCORE   Total Score 14 (moderate anxiety)  4 (minimal anxiety)   Total Score 14 5 4     {Last PHQ9 or GAD7 Responses (Optional):437260}    Suicide Assessment Five-step Evaluation and Treatment (SAFE-T)  {Provider  Link to Depression Care Package SmartSet :729013}      {additonal problems for provider to add (Optional):999684}    {ROS Picklists (Optional):538824}      Objective           Vitals:  No vitals were obtained today due to virtual visit.    Physical Exam   {video visit exam brief selected:221563}    {Diagnostic Test Results (Optional):848265}      Video-Visit Details    Type of service:  Video Visit   Originating Location (pt. Location): {video visit patient location:008132::\"Home\"}  {PROVIDER LOCATION On-site should be selected for visits conducted from your clinic location or adjoining [facility] hospital, academic office, or other nearby [facility] building. Off-site should be selected for all other provider locations, including home:070580}  Distant Location (provider location):  {virtual location provider:371300}  Platform used for Video Visit: {Virtual Visit Platforms:480292::\"GlobalView Software\"}  Signed Electronically by: REYNA Armenta CNP  {Email feedback regarding this note to [email or web address]   :999413}  "

## 2024-06-25 ASSESSMENT — PATIENT HEALTH QUESTIONNAIRE - PHQ9: SUM OF ALL RESPONSES TO PHQ QUESTIONS 1-9: 2

## 2024-06-26 ENCOUNTER — VIRTUAL VISIT (OUTPATIENT)
Dept: FAMILY MEDICINE | Facility: OTHER | Age: 44
End: 2024-06-26
Attending: NURSE PRACTITIONER
Payer: COMMERCIAL

## 2024-06-26 DIAGNOSIS — F41.9 ANXIETY AND DEPRESSION: Primary | ICD-10-CM

## 2024-06-26 DIAGNOSIS — F41.1 GAD (GENERALIZED ANXIETY DISORDER): ICD-10-CM

## 2024-06-26 DIAGNOSIS — F32.A ANXIETY AND DEPRESSION: Primary | ICD-10-CM

## 2024-06-26 PROCEDURE — 99443 PR PHYSICIAN TELEPHONE EVALUATION 21-30 MIN: CPT | Mod: 93 | Performed by: NURSE PRACTITIONER

## 2024-06-26 RX ORDER — HYDROXYZINE HYDROCHLORIDE 10 MG/1
10-20 TABLET, FILM COATED ORAL 3 TIMES DAILY PRN
Qty: 60 TABLET | Refills: 1 | Status: SHIPPED | OUTPATIENT
Start: 2024-06-26

## 2024-06-26 ASSESSMENT — PATIENT HEALTH QUESTIONNAIRE - PHQ9
SUM OF ALL RESPONSES TO PHQ QUESTIONS 1-9: 2
10. IF YOU CHECKED OFF ANY PROBLEMS, HOW DIFFICULT HAVE THESE PROBLEMS MADE IT FOR YOU TO DO YOUR WORK, TAKE CARE OF THINGS AT HOME, OR GET ALONG WITH OTHER PEOPLE: NOT DIFFICULT AT ALL

## 2024-06-26 ASSESSMENT — ANXIETY QUESTIONNAIRES: GAD7 TOTAL SCORE: 4

## 2024-06-26 NOTE — PROGRESS NOTES
Sheridan is a 43 year old who is being evaluated via a billable telephone visit.    What phone number would you like to be contacted at? [phone]  How would you like to obtain your AVS? [place]  Originating Location (pt. Location): Home    Distant Location (provider location):  On-site    Assessment & Plan     Anxiety and depression  Sheridan feels like her anxiety and depression have improved - she would like to continue with current dose for now - can consider increasing in the future if needed   Continue with current dose of Wellbutrin     MAURA (generalized anxiety disorder)  Hydroxyzine 25 mg daily causes increased drowsiness - plan to try reduced dose since it does help with her anxiety   - hydrOXYzine HCl (ATARAX) 10 MG tablet; Take 1-2 tablets (10-20 mg) by mouth 3 times daily as needed for anxiety      Due for labs - scheduled for yearly physical coming up       See Patient Instructions    No follow-ups on file.    Subjective   Sheridan is a 43 year old, presenting for the following health issues:  Depression and Anxiety    History of Present Illness       Mental Health Follow-up:  Patient presents to follow-up on Depression & Anxiety.Patient's depression since last visit has been:  Medium  The patient is not having other symptoms associated with depression.  Patient's anxiety since last visit has been:  Medium  The patient is not having other symptoms associated with anxiety.  Any significant life events: No  Patient is feeling anxious or having panic attacks.  Patient has no concerns about alcohol or drug use.    She eats 2-3 servings of fruits and vegetables daily.She consumes 0 sweetened beverage(s) daily.She exercises with enough effort to increase her heart rate 10 to 19 minutes per day.  She exercises with enough effort to increase her heart rate 3 or less days per week. She is missing 1 dose(s) of medications per week.  She is not taking prescribed medications regularly due to remembering to  take.   History of Present Illness       Depression and Anxiety   How are you doing with your depression since your last visit? No change  How are you doing with your anxiety since your last visit?  No change  Are you having other symptoms that might be associated with depression or anxiety? No  Have you had a significant life event? No   Do you have any concerns with your use of alcohol or other drugs? No  Wellbutrin 300 mg daily - she feels she has had some improvement   Limited self care - no counseling at this time - is considering virtual - but feels more stress when trying to schedule   Sleep - feels like it is fine - if wakes at nights takes a melatonin   Occasionally uses hydroxyzine for increased anxiety - does make her very drowsy     Social History     Tobacco Use    Smoking status: Former     Current packs/day: 0.00     Types: Cigarettes     Quit date: 2002     Years since quittin.4    Smokeless tobacco: Never   Vaping Use    Vaping status: Never Used   Substance Use Topics    Alcohol use: Yes     Comment: socially    Drug use: No         2023     3:20 PM 2023     8:00 AM 2024    10:58 AM   PHQ   PHQ-9 Total Score 7 2 2   Q9: Thoughts of better off dead/self-harm past 2 weeks Not at all Not at all Not at all         2023     3:21 PM 2023     8:00 AM 2024     9:35 AM   MAURA-7 SCORE   Total Score 14 (moderate anxiety)  4 (minimal anxiety)   Total Score 14 5 4         2024    10:58 AM   Last PHQ-9   1.  Little interest or pleasure in doing things 0   2.  Feeling down, depressed, or hopeless 1   3.  Trouble falling or staying asleep, or sleeping too much 0   4.  Feeling tired or having little energy 0   5.  Poor appetite or overeating 0   6.  Feeling bad about yourself 1   7.  Trouble concentrating 0   8.  Moving slowly or restless 0   Q9: Thoughts of better off dead/self-harm past 2 weeks 0   PHQ-9 Total Score 2         2024     9:35 AM   MAURA-7    1. Feeling  nervous, anxious, or on edge 1   2. Not being able to stop or control worrying 1   3. Worrying too much about different things 1   4. Trouble relaxing 0   5. Being so restless that it is hard to sit still 0   6. Becoming easily annoyed or irritable 0   7. Feeling afraid, as if something awful might happen 1   MAURA-7 Total Score 4   If you checked any problems, how difficult have they made it for you to do your work, take care of things at home, or get along with other people? Not difficult at all       Suicide Assessment Five-step Evaluation and Treatment (SAFE-T)            Review of Systems  CONSTITUTIONAL: NEGATIVE for fever, chills, change in weight  RESP: NEGATIVE for significant cough or SOB  CV: NEGATIVE for chest pain, palpitations or peripheral edema  ENDOCRINE: Hx goiter  PSYCHIATRIC: HX anxiety and HX depression      Objective           Vitals:  No vitals were obtained today due to virtual visit.    Physical Exam   General: Alert and no distress //Respiratory: No audible wheeze, cough, or shortness of breath // Psychiatric:  Appropriate affect, tone, and pace of words      Due for labs - has her yearly physical coming up with OB/GYN      Phone call duration: 21 minutes  Signed Electronically by: REYNA Armenta CNP

## 2024-07-07 ENCOUNTER — HEALTH MAINTENANCE LETTER (OUTPATIENT)
Age: 44
End: 2024-07-07

## 2024-07-12 ENCOUNTER — OFFICE VISIT (OUTPATIENT)
Dept: FAMILY MEDICINE | Facility: OTHER | Age: 44
End: 2024-07-12
Attending: STUDENT IN AN ORGANIZED HEALTH CARE EDUCATION/TRAINING PROGRAM
Payer: COMMERCIAL

## 2024-07-12 ENCOUNTER — MYC MEDICAL ADVICE (OUTPATIENT)
Dept: FAMILY MEDICINE | Facility: OTHER | Age: 44
End: 2024-07-12

## 2024-07-12 VITALS
WEIGHT: 176.8 LBS | HEIGHT: 67 IN | TEMPERATURE: 98.8 F | BODY MASS INDEX: 27.75 KG/M2 | OXYGEN SATURATION: 98 % | DIASTOLIC BLOOD PRESSURE: 64 MMHG | SYSTOLIC BLOOD PRESSURE: 92 MMHG | RESPIRATION RATE: 16 BRPM | HEART RATE: 73 BPM

## 2024-07-12 DIAGNOSIS — M25.50 ARTHRALGIA, UNSPECIFIED JOINT: ICD-10-CM

## 2024-07-12 DIAGNOSIS — R53.83 OTHER FATIGUE: Primary | ICD-10-CM

## 2024-07-12 DIAGNOSIS — R74.01 ELEVATED ALT MEASUREMENT: Primary | ICD-10-CM

## 2024-07-12 LAB
ALBUMIN SERPL BCG-MCNC: 4 G/DL (ref 3.5–5.2)
ALBUMIN UR-MCNC: NEGATIVE MG/DL
ALP SERPL-CCNC: 49 U/L (ref 40–150)
ALT SERPL W P-5'-P-CCNC: 75 U/L (ref 0–50)
ANION GAP SERPL CALCULATED.3IONS-SCNC: 10 MMOL/L (ref 7–15)
APPEARANCE UR: CLEAR
AST SERPL W P-5'-P-CCNC: 34 U/L (ref 0–45)
BASOPHILS # BLD AUTO: 0.1 10E3/UL (ref 0–0.2)
BASOPHILS NFR BLD AUTO: 2 %
BILIRUB SERPL-MCNC: 0.3 MG/DL
BILIRUB UR QL STRIP: NEGATIVE
BUN SERPL-MCNC: 10.4 MG/DL (ref 6–20)
CALCIUM SERPL-MCNC: 8.7 MG/DL (ref 8.6–10)
CHLORIDE SERPL-SCNC: 105 MMOL/L (ref 98–107)
COLOR UR AUTO: ABNORMAL
CREAT SERPL-MCNC: 0.85 MG/DL (ref 0.51–0.95)
CRP SERPL-MCNC: 4.13 MG/L
DEPRECATED HCO3 PLAS-SCNC: 24 MMOL/L (ref 22–29)
EGFRCR SERPLBLD CKD-EPI 2021: 86 ML/MIN/1.73M2
EOSINOPHIL # BLD AUTO: 0.1 10E3/UL (ref 0–0.7)
EOSINOPHIL NFR BLD AUTO: 3 %
ERYTHROCYTE [DISTWIDTH] IN BLOOD BY AUTOMATED COUNT: 12 % (ref 10–15)
ERYTHROCYTE [SEDIMENTATION RATE] IN BLOOD BY WESTERGREN METHOD: 9 MM/HR (ref 0–20)
FLUAV RNA SPEC QL NAA+PROBE: NEGATIVE
FLUBV RNA RESP QL NAA+PROBE: NEGATIVE
GLUCOSE SERPL-MCNC: 96 MG/DL (ref 70–99)
GLUCOSE UR STRIP-MCNC: NEGATIVE MG/DL
HCT VFR BLD AUTO: 38.6 % (ref 35–47)
HGB BLD-MCNC: 13.3 G/DL (ref 11.7–15.7)
HGB UR QL STRIP: NEGATIVE
IMM GRANULOCYTES # BLD: 0.1 10E3/UL
IMM GRANULOCYTES NFR BLD: 3 %
KETONES UR STRIP-MCNC: NEGATIVE MG/DL
LEUKOCYTE ESTERASE UR QL STRIP: NEGATIVE
LYMPHOCYTES # BLD AUTO: 1.3 10E3/UL (ref 0.8–5.3)
LYMPHOCYTES NFR BLD AUTO: 24 %
MCH RBC QN AUTO: 29.3 PG (ref 26.5–33)
MCHC RBC AUTO-ENTMCNC: 34.5 G/DL (ref 31.5–36.5)
MCV RBC AUTO: 85 FL (ref 78–100)
MONOCYTES # BLD AUTO: 0.4 10E3/UL (ref 0–1.3)
MONOCYTES NFR BLD AUTO: 8 %
MONOCYTES NFR BLD AUTO: NEGATIVE %
MUCOUS THREADS #/AREA URNS LPF: PRESENT /LPF
NEUTROPHILS # BLD AUTO: 3.2 10E3/UL (ref 1.6–8.3)
NEUTROPHILS NFR BLD AUTO: 61 %
NITRATE UR QL: NEGATIVE
NRBC # BLD AUTO: 0 10E3/UL
NRBC BLD AUTO-RTO: 0 /100
PH UR STRIP: 6 [PH] (ref 4.7–8)
PLATELET # BLD AUTO: 232 10E3/UL (ref 150–450)
POTASSIUM SERPL-SCNC: 3.6 MMOL/L (ref 3.4–5.3)
PROT SERPL-MCNC: 6.4 G/DL (ref 6.4–8.3)
RBC # BLD AUTO: 4.54 10E6/UL (ref 3.8–5.2)
RBC URINE: <1 /HPF
RSV RNA SPEC NAA+PROBE: NEGATIVE
SARS-COV-2 RNA RESP QL NAA+PROBE: NEGATIVE
SODIUM SERPL-SCNC: 139 MMOL/L (ref 135–145)
SP GR UR STRIP: 1.01 (ref 1–1.03)
SQUAMOUS EPITHELIAL: 1 /HPF
UROBILINOGEN UR STRIP-MCNC: NORMAL MG/DL
WBC # BLD AUTO: 5.2 10E3/UL (ref 4–11)
WBC URINE: <1 /HPF

## 2024-07-12 PROCEDURE — 36415 COLL VENOUS BLD VENIPUNCTURE: CPT | Performed by: STUDENT IN AN ORGANIZED HEALTH CARE EDUCATION/TRAINING PROGRAM

## 2024-07-12 PROCEDURE — 87798 DETECT AGENT NOS DNA AMP: CPT | Performed by: STUDENT IN AN ORGANIZED HEALTH CARE EDUCATION/TRAINING PROGRAM

## 2024-07-12 PROCEDURE — 80053 COMPREHEN METABOLIC PANEL: CPT | Performed by: STUDENT IN AN ORGANIZED HEALTH CARE EDUCATION/TRAINING PROGRAM

## 2024-07-12 PROCEDURE — 85025 COMPLETE CBC W/AUTO DIFF WBC: CPT | Performed by: STUDENT IN AN ORGANIZED HEALTH CARE EDUCATION/TRAINING PROGRAM

## 2024-07-12 PROCEDURE — 86140 C-REACTIVE PROTEIN: CPT | Performed by: STUDENT IN AN ORGANIZED HEALTH CARE EDUCATION/TRAINING PROGRAM

## 2024-07-12 PROCEDURE — 81001 URINALYSIS AUTO W/SCOPE: CPT | Performed by: STUDENT IN AN ORGANIZED HEALTH CARE EDUCATION/TRAINING PROGRAM

## 2024-07-12 PROCEDURE — 86308 HETEROPHILE ANTIBODY SCREEN: CPT | Performed by: STUDENT IN AN ORGANIZED HEALTH CARE EDUCATION/TRAINING PROGRAM

## 2024-07-12 PROCEDURE — 86618 LYME DISEASE ANTIBODY: CPT | Performed by: STUDENT IN AN ORGANIZED HEALTH CARE EDUCATION/TRAINING PROGRAM

## 2024-07-12 PROCEDURE — 87468 ANAPLSMA PHGCYTOPHLM AMP PRB: CPT | Performed by: STUDENT IN AN ORGANIZED HEALTH CARE EDUCATION/TRAINING PROGRAM

## 2024-07-12 PROCEDURE — 85652 RBC SED RATE AUTOMATED: CPT | Performed by: STUDENT IN AN ORGANIZED HEALTH CARE EDUCATION/TRAINING PROGRAM

## 2024-07-12 PROCEDURE — 87637 SARSCOV2&INF A&B&RSV AMP PRB: CPT | Performed by: STUDENT IN AN ORGANIZED HEALTH CARE EDUCATION/TRAINING PROGRAM

## 2024-07-12 PROCEDURE — 99214 OFFICE O/P EST MOD 30 MIN: CPT | Performed by: STUDENT IN AN ORGANIZED HEALTH CARE EDUCATION/TRAINING PROGRAM

## 2024-07-12 RX ORDER — DOXYCYCLINE HYCLATE 100 MG
100 TABLET ORAL 2 TIMES DAILY
Qty: 28 TABLET | Refills: 0 | Status: SHIPPED | OUTPATIENT
Start: 2024-07-12 | End: 2024-07-26

## 2024-07-12 ASSESSMENT — PAIN SCALES - GENERAL: PAINLEVEL: MILD PAIN (3)

## 2024-07-12 NOTE — PROGRESS NOTES
"  Assessment & Plan     Other fatigue  Arthralgia, unspecified joint  Definitely features of tickborne illness with possible exposure; migrating arthralgias/myalgias, severe fatigue, some sweats/fevers for over 1 week.   had similar syndrome increasing likelihood of viral illness however.  Do recommend comprehensive lab screening given duration and severity.  Also discussed risks/benefits of empiric treatment for tickborne illness; doxycycline as below.  Continue supportive cares, will watch labs and follow-up if not improving.  - doxycycline hyclate (VIBRA-TABS) 100 MG tablet; Take 1 tablet (100 mg) by mouth 2 times daily for 14 days  - Symptomatic Influenza A/B, RSV, & SARS-CoV2 PCR (COVID-19) Nose  - CBC with platelets and differential  - Comprehensive metabolic panel (BMP + Alb, Alk Phos, ALT, AST, Total. Bili, TP)  - CRP, inflammation  - ESR: Erythrocyte sedimentation rate  - LYME DISEASE TOTAL ANTIBODIES WITH REFLEX TO CONFIRMATION  - Tick-Borne Disease Panel by PCR  - UA Macroscopic with reflex to Microscopic and Culture  - Mononucleosis screen  - Reviewed S/S that warrant urgent evaluation    BMI  Estimated body mass index is 27.69 kg/m  as calculated from the following:    Height as of this encounter: 1.702 m (5' 7\").    Weight as of this encounter: 80.2 kg (176 lb 12.8 oz).   Weight management plan: Did not discuss today.    Follow-up if not improving.    Subjective   Sheridan is a 43 year old, presenting for the following health issues:  Arthritis (Joint pain)        7/12/2024     3:14 PM   Additional Questions   Roomed by Puma Mercedes LPN   Accompanied by Self         7/12/2024     3:14 PM   Patient Reported Additional Medications   Patient reports taking the following new medications None     History of Present Illness       Reason for visit:  Joint pain swelling  Symptom onset:  3-7 days ago    She eats 2-3 servings of fruits and vegetables daily.She consumes 0 sweetened beverage(s) daily.She " "exercises with enough effort to increase her heart rate 10 to 19 minutes per day.  She exercises with enough effort to increase her heart rate 3 or less days per week.   She is taking medications regularly.       Concern - Joint pain, swelling, sweating, jaw hurts  Onset: 07/02/2024  Description: Started having joint pain, swelling, sweating.  Did state she had a wood tick bite on June 23 rd.  Intensity: moderate  Progression of Symptoms:  worsening and intermittent  Accompanying Signs & Symptoms: Joint pain, swelling, sweating  Previous history of similar problem: None  Precipitating factors:        Worsened by: none  Alleviating factors:        Improved by: Medication  Therapies tried and outcome: AdvilDiann relieves pain     -Evolving host of constitutional symptoms over the past nearly 2 weeks  -Evolving/migrating arthralgias  -Puffy joints, fingers, feet  -Pretty significant fatigue  -Thought she was may be turning the corner in the middle of the illness but symptoms just continue to evolve  -Did have a tick on her leg about a week prior to onset of symptoms  -Never developed bull's-eye rash  -Intermittent sweating, lethargy  -Has been actually had similar illness  -Went in and labs were negative but treated empirically with doxycycline  -OTC meds take the edge off a little bit  -No GI,  symptoms  -No rash  -No ongoing fevers    Review of Systems  Constitutional, HEENT, cardiovascular, pulmonary, gi and gu systems are negative, except as otherwise noted.      Objective    BP 92/64   Pulse 73   Temp 98.8  F (37.1  C) (Tympanic)   Resp 16   Ht 1.702 m (5' 7\")   Wt 80.2 kg (176 lb 12.8 oz)   SpO2 98%   BMI 27.69 kg/m    Body mass index is 27.69 kg/m .  Physical Exam  Vitals reviewed.   Constitutional:       General: She is not in acute distress.     Appearance: Normal appearance. She is not toxic-appearing.      Comments: Tired appearing   HENT:      Head: Normocephalic and atraumatic.      Right Ear: " Tympanic membrane, ear canal and external ear normal.      Left Ear: Tympanic membrane, ear canal and external ear normal.      Nose: No congestion.      Mouth/Throat:      Mouth: Mucous membranes are moist.      Pharynx: No oropharyngeal exudate or posterior oropharyngeal erythema.   Cardiovascular:      Rate and Rhythm: Normal rate and regular rhythm.      Heart sounds: Normal heart sounds.   Pulmonary:      Effort: Pulmonary effort is normal.      Breath sounds: Normal breath sounds.   Abdominal:      General: Abdomen is flat.      Palpations: Abdomen is soft.   Musculoskeletal:         General: Normal range of motion.   Lymphadenopathy:      Cervical: No cervical adenopathy.   Skin:     General: Skin is warm and dry.   Neurological:      General: No focal deficit present.      Mental Status: She is alert and oriented to person, place, and time.   Psychiatric:         Mood and Affect: Mood normal.         Behavior: Behavior normal.        Signed Electronically by: Jack Kelley MD

## 2024-07-14 LAB
A PHAGOCYTOPH DNA BLD QL NAA+PROBE: NOT DETECTED
BABESIA DNA BLD QL NAA+PROBE: NOT DETECTED
EHRLICHIA DNA SPEC QL NAA+PROBE: NOT DETECTED

## 2024-07-15 LAB — B BURGDOR IGG+IGM SER QL: 0.36

## 2024-07-15 SDOH — HEALTH STABILITY: PHYSICAL HEALTH: ON AVERAGE, HOW MANY MINUTES DO YOU ENGAGE IN EXERCISE AT THIS LEVEL?: 30 MIN

## 2024-07-15 SDOH — HEALTH STABILITY: PHYSICAL HEALTH: ON AVERAGE, HOW MANY DAYS PER WEEK DO YOU ENGAGE IN MODERATE TO STRENUOUS EXERCISE (LIKE A BRISK WALK)?: 4 DAYS

## 2024-07-15 ASSESSMENT — SOCIAL DETERMINANTS OF HEALTH (SDOH): HOW OFTEN DO YOU GET TOGETHER WITH FRIENDS OR RELATIVES?: PATIENT DECLINED

## 2024-07-16 ENCOUNTER — MYC REFILL (OUTPATIENT)
Dept: OBGYN | Facility: OTHER | Age: 44
End: 2024-07-16

## 2024-07-16 ENCOUNTER — MYC MEDICAL ADVICE (OUTPATIENT)
Dept: OBGYN | Facility: OTHER | Age: 44
End: 2024-07-16

## 2024-07-16 ENCOUNTER — OFFICE VISIT (OUTPATIENT)
Dept: OBGYN | Facility: OTHER | Age: 44
End: 2024-07-16
Attending: NURSE PRACTITIONER
Payer: COMMERCIAL

## 2024-07-16 ENCOUNTER — LAB (OUTPATIENT)
Dept: LAB | Facility: OTHER | Age: 44
End: 2024-07-16
Attending: NURSE PRACTITIONER
Payer: COMMERCIAL

## 2024-07-16 VITALS
DIASTOLIC BLOOD PRESSURE: 78 MMHG | HEIGHT: 67 IN | BODY MASS INDEX: 27.62 KG/M2 | SYSTOLIC BLOOD PRESSURE: 110 MMHG | WEIGHT: 176 LBS | HEART RATE: 64 BPM

## 2024-07-16 DIAGNOSIS — R74.01 ELEVATED ALT MEASUREMENT: Primary | ICD-10-CM

## 2024-07-16 DIAGNOSIS — Z01.419 WELL WOMAN EXAM WITH ROUTINE GYNECOLOGICAL EXAM: Primary | ICD-10-CM

## 2024-07-16 DIAGNOSIS — A60.04 HERPES SIMPLEX VULVOVAGINITIS: ICD-10-CM

## 2024-07-16 DIAGNOSIS — E03.9 HYPOTHYROIDISM, UNSPECIFIED TYPE: ICD-10-CM

## 2024-07-16 DIAGNOSIS — E03.9 HYPOTHYROIDISM: ICD-10-CM

## 2024-07-16 LAB
ALBUMIN SERPL BCG-MCNC: 4.2 G/DL (ref 3.5–5.2)
ALP SERPL-CCNC: 51 U/L (ref 40–150)
ALT SERPL W P-5'-P-CCNC: 44 U/L (ref 0–50)
AST SERPL W P-5'-P-CCNC: 21 U/L (ref 0–45)
BILIRUB DIRECT SERPL-MCNC: <0.2 MG/DL (ref 0–0.3)
BILIRUB SERPL-MCNC: 0.5 MG/DL
HOLD SPECIMEN: NORMAL
PROT SERPL-MCNC: 7.1 G/DL (ref 6.4–8.3)
T4 FREE SERPL-MCNC: 1.46 NG/DL (ref 0.9–1.7)
TSH SERPL DL<=0.005 MIU/L-ACNC: 2.58 UIU/ML (ref 0.3–4.2)

## 2024-07-16 PROCEDURE — 80076 HEPATIC FUNCTION PANEL: CPT

## 2024-07-16 PROCEDURE — 99396 PREV VISIT EST AGE 40-64: CPT | Performed by: NURSE PRACTITIONER

## 2024-07-16 PROCEDURE — 84443 ASSAY THYROID STIM HORMONE: CPT

## 2024-07-16 PROCEDURE — 84439 ASSAY OF FREE THYROXINE: CPT

## 2024-07-16 PROCEDURE — 36415 COLL VENOUS BLD VENIPUNCTURE: CPT

## 2024-07-16 ASSESSMENT — ANXIETY QUESTIONNAIRES
5. BEING SO RESTLESS THAT IT IS HARD TO SIT STILL: NOT AT ALL
3. WORRYING TOO MUCH ABOUT DIFFERENT THINGS: SEVERAL DAYS
GAD7 TOTAL SCORE: 4
GAD7 TOTAL SCORE: 4
1. FEELING NERVOUS, ANXIOUS, OR ON EDGE: SEVERAL DAYS
2. NOT BEING ABLE TO STOP OR CONTROL WORRYING: SEVERAL DAYS
7. FEELING AFRAID AS IF SOMETHING AWFUL MIGHT HAPPEN: SEVERAL DAYS
IF YOU CHECKED OFF ANY PROBLEMS ON THIS QUESTIONNAIRE, HOW DIFFICULT HAVE THESE PROBLEMS MADE IT FOR YOU TO DO YOUR WORK, TAKE CARE OF THINGS AT HOME, OR GET ALONG WITH OTHER PEOPLE: NOT DIFFICULT AT ALL
6. BECOMING EASILY ANNOYED OR IRRITABLE: NOT AT ALL

## 2024-07-16 ASSESSMENT — PAIN SCALES - GENERAL: PAINLEVEL: MILD PAIN (2)

## 2024-07-16 ASSESSMENT — PATIENT HEALTH QUESTIONNAIRE - PHQ9
SUM OF ALL RESPONSES TO PHQ QUESTIONS 1-9: 3
5. POOR APPETITE OR OVEREATING: NOT AT ALL

## 2024-07-16 NOTE — TELEPHONE ENCOUNTER
Acyclovir      Last Written Prescription Date:  9/19/22  Last Fill Quantity: 30,   # refills: 0  Last Office Visit: 7/16/24  Future Office visit:       Routing refill request to provider for review/approval because:

## 2024-07-16 NOTE — PROGRESS NOTES
CC:  Annual exam  HPI:  Sheridan Rodriges is a 43 year old female P2 No LMP recorded. (Menstrual status: IUD).    Mirena IUD in place and doing well.  Occasional menses and light spotting. Denies vaginal discharge, dyspareunia, or breast concerns.  Mammogram is scheduled. Thyroid labs today for PCP.  Remainder of her exam is through her PCP.  No other c/o.      Past GYN history:  No STD history  STI testing offered?  Declined       Last PAP smear:  Normal  Mammograms up to date: yes    Past Medical History:   Diagnosis Date    Anxiety state, unspecified 03/12/2007    Chronic tonsillitis 04/15/2015    RC II (cervical intraepithelial neoplasia II) 06/13/2016    LEEP=cervicitis only    Galactorrhea not associated with childbirth 05/10/2005    Goiter 01/08/2014    High risk HPV infection 05/24/2016    other    Mastodynia 01/08/2014    Multiple joint pain 08/22/2018    Papanicolaou smear of cervix with atypical squamous cells of undetermined significance (ASC-US) 05/31/2017    Neg hr hpv    Papanicolaou smear of cervix with low grade squamous intraepithelial lesion (LGSIL) 05/23/2016    Perineal lump 12/01/2020    Tonsillar hypertrophy 04/15/2015       Past Surgical History:   Procedure Laterality Date    ABDOMINOPLASTY      BIOPSY BREAST      BUNIONECTOMY Right 10/29/2018    Procedure: RIGHT 1ST METATARSOPHALANGEAL JOINT CHEILECTOMY WITH IMPLANT;  Surgeon: Katty Schroeder DPM;  Location: [facility]    CHEILECTOMY Right 10/29/2018    Procedure: CHEILECTOMY;  Surgeon: Katty Schroeder DPM;  Location: [facility]    CONIZATION LEEP N/A 08/01/2016    Procedure: CONIZATION LEEP;  Surgeon: Chioma Wiggins MD;  Location: [facility]    LAPAROSCOPIC SALPINGECTOMY Bilateral 11/22/2017    Procedure: LAPAROSCOPIC SALPINGECTOMY;  LAPAROSCOPIC BILATERAL SALPINGECTOMY;  Surgeon: Kalin Hernandez MD;  Location: [facility]    MAMMOPLASTY AUGMENTATION      MAMMOPLASTY AUGMENTATION BILATERAL         Family History   Problem Relation Age of Onset     "Hypertension Father     Other - See Comments Father         lymphoma    Uterine Cancer Cousin        Current Outpatient Medications   Medication Sig Dispense Refill    acyclovir (ZOVIRAX) 400 MG tablet TAKE 1 TABLET BY MOUTH EVERY 8 HOURS 30 tablet 0    buPROPion (WELLBUTRIN XL) 300 MG 24 hr tablet TAKE 1 TABLET EVERY MORNING 90 tablet 1    doxycycline hyclate (VIBRA-TABS) 100 MG tablet Take 1 tablet (100 mg) by mouth 2 times daily for 14 days 28 tablet 0    hydrOXYzine HCl (ATARAX) 10 MG tablet Take 1-2 tablets (10-20 mg) by mouth 3 times daily as needed for anxiety 60 tablet 1    levothyroxine (SYNTHROID/LEVOTHROID) 75 MCG tablet TAKE 1 TABLET BY MOUTH DAILY 90 tablet 0       Allergies: Erythromycin and Penicillins    ROS:  CONSTITUTIONAL:NEGATIVE for fever, chills, change in weight  BREAST: NEGATIVE for masses, tenderness or discharge  : negative for, dysparunia, incontinence, and vaginal discharge    EXAM:  Blood pressure 110/78, pulse 64, height 1.702 m (5' 7\"), weight 79.8 kg (176 lb), unknown if currently breastfeeding.   BMI= Body mass index is 27.57 kg/m .  General - pleasant female in no acute distress.  Breast - no nodularity, asymmetry or nipple discharge bilaterally.  Abdomen - soft, nontender, nondistended, no hepatosplenomegaly.  Pelvic - EG: normal adult female, BUS: within normal limits, Vagina: well rugated, no discharge, Cervix: no lesions or CMT, Uterus: firm, normal sized and nontender, Adnexae: no masses or tenderness.  Rectovaginal - deferred.  Musculoskeletal - no gross deformities.  Neurological - normal strength, sensation, and mental status.      ASSESSMENT/PLAN:  (Z01.419) Well woman exam with routine gynecological exam  (primary encounter diagnosis)  Comment:   Plan: return annually and as needed    (E03.9) Hypothyroidism, unspecified type  Comment:   Plan: TSH, T4, free              Discussed exercise and healthy eating, including calcium intake.  She should return to the clinic in " one year, or sooner if problems arise.

## 2024-07-18 RX ORDER — ACYCLOVIR 400 MG/1
TABLET ORAL
Qty: 30 TABLET | Refills: 0 | Status: SHIPPED | OUTPATIENT
Start: 2024-07-18

## 2024-07-30 ENCOUNTER — LAB (OUTPATIENT)
Dept: LAB | Facility: OTHER | Age: 44
End: 2024-07-30
Payer: COMMERCIAL

## 2024-07-30 ENCOUNTER — MYC MEDICAL ADVICE (OUTPATIENT)
Dept: FAMILY MEDICINE | Facility: OTHER | Age: 44
End: 2024-07-30

## 2024-07-30 DIAGNOSIS — R35.0 URINARY FREQUENCY: Primary | ICD-10-CM

## 2024-07-30 DIAGNOSIS — R35.0 URINARY FREQUENCY: ICD-10-CM

## 2024-07-30 LAB
ALBUMIN UR-MCNC: NEGATIVE MG/DL
APPEARANCE UR: CLEAR
BILIRUB UR QL STRIP: NEGATIVE
COLOR UR AUTO: NORMAL
GLUCOSE UR STRIP-MCNC: NEGATIVE MG/DL
HGB UR QL STRIP: NEGATIVE
HOLD SPECIMEN: NORMAL
KETONES UR STRIP-MCNC: NEGATIVE MG/DL
LEUKOCYTE ESTERASE UR QL STRIP: NEGATIVE
NITRATE UR QL: NEGATIVE
PH UR STRIP: 7 [PH] (ref 4.7–8)
RBC URINE: <1 /HPF
SP GR UR STRIP: 1.01 (ref 1–1.03)
SQUAMOUS EPITHELIAL: 0 /HPF
UROBILINOGEN UR STRIP-MCNC: NORMAL MG/DL
WBC URINE: 0 /HPF

## 2024-07-30 PROCEDURE — 81001 URINALYSIS AUTO W/SCOPE: CPT

## 2024-07-31 ENCOUNTER — ORDERS ONLY (AUTO-RELEASED) (OUTPATIENT)
Dept: FAMILY MEDICINE | Facility: OTHER | Age: 44
End: 2024-07-31

## 2024-07-31 ENCOUNTER — OFFICE VISIT (OUTPATIENT)
Dept: FAMILY MEDICINE | Facility: OTHER | Age: 44
End: 2024-07-31
Attending: NURSE PRACTITIONER
Payer: COMMERCIAL

## 2024-07-31 VITALS
TEMPERATURE: 98.6 F | RESPIRATION RATE: 16 BRPM | DIASTOLIC BLOOD PRESSURE: 78 MMHG | HEART RATE: 84 BPM | SYSTOLIC BLOOD PRESSURE: 116 MMHG | OXYGEN SATURATION: 99 % | HEIGHT: 67 IN | BODY MASS INDEX: 28.16 KG/M2 | WEIGHT: 179.4 LBS

## 2024-07-31 DIAGNOSIS — R61 NIGHT SWEATS: ICD-10-CM

## 2024-07-31 DIAGNOSIS — R35.0 URINARY FREQUENCY: Primary | ICD-10-CM

## 2024-07-31 DIAGNOSIS — Z12.11 SPECIAL SCREENING FOR MALIGNANT NEOPLASMS, COLON: ICD-10-CM

## 2024-07-31 DIAGNOSIS — Z87.42 HISTORY OF OVARIAN CYST: ICD-10-CM

## 2024-07-31 PROCEDURE — G2211 COMPLEX E/M VISIT ADD ON: HCPCS | Performed by: NURSE PRACTITIONER

## 2024-07-31 PROCEDURE — 83001 ASSAY OF GONADOTROPIN (FSH): CPT | Performed by: NURSE PRACTITIONER

## 2024-07-31 PROCEDURE — 36415 COLL VENOUS BLD VENIPUNCTURE: CPT | Performed by: NURSE PRACTITIONER

## 2024-07-31 PROCEDURE — 99214 OFFICE O/P EST MOD 30 MIN: CPT | Performed by: NURSE PRACTITIONER

## 2024-07-31 RX ORDER — NITROFURANTOIN 25; 75 MG/1; MG/1
100 CAPSULE ORAL 2 TIMES DAILY
Qty: 10 CAPSULE | Refills: 0 | Status: SHIPPED | OUTPATIENT
Start: 2024-07-31 | End: 2024-08-05

## 2024-07-31 ASSESSMENT — PAIN SCALES - GENERAL: PAINLEVEL: NO PAIN (0)

## 2024-07-31 NOTE — PROGRESS NOTES
Assessment & Plan     Urinary frequency  Sheridan is going on vacation.  If symptoms do not improve or worsen ok to use antibiotic.  She will hold off for now   Concerns for either starting menopause or some mild dehydration causing symptoms.    She has an IUD - can discuss with OB/GYN if symptoms persist to see if vaginal estrogen may be an option   - nitroFURantoin macrocrystal-monohydrate (MACROBID) 100 MG capsule; Take 1 capsule (100 mg) by mouth 2 times daily for 5 days    History of ovarian cyst  Sheridan is having some right groin pain   Consider recurrent ovarian cyst or radiating pain from her back. Pain is not severe or causing her any current problems.  Ok to wait on the US or cancel if symptoms clear on there own   - US Pelvic Complete with Transvaginal; Future    Night sweats  Concerns for possible perimenopause vs menopausal  she has an IUD without a menstrual cycle   - Follicle stimulating hormone; Future  - Follicle stimulating hormone    Special screening for malignant neoplasms, colon  Due for screening   - COLOGUARD([employer]); Future      The longitudinal plan of care for the diagnosis(es)/condition(s) as documented were addressed during this visit. Due to the added complexity in care, I will continue to support Sheridan in the subsequent management and with ongoing continuity of care.    Ordering of each unique test        See Patient Instructions    No follow-ups on file.    Subjective   Sheridan is a 43 year old, presenting for the following health issues:  Urinary Problem        7/31/2024    12:49 PM   Additional Questions   Roomed by Puma Mercedes LPN   Accompanied by Self         7/31/2024    12:49 PM   Patient Reported Additional Medications   Patient reports taking the following new medications None     HPI     Genitourinary - Female   post void residual 104 mL  Onset/Duration: Last Sunday  Description:   Painful urination (Dysuria): No           Frequency: YES  Blood in urine  "(Hematuria): No  Delay in urine (Hesitency): No  Intensity: mild  Progression of Symptoms:  worsening  Accompanying Signs & Symptoms:  Fever/chills: No  Flank pain: YES  Nausea and vomiting: No  Vaginal symptoms: none  Abdominal/Pelvic Pain: Pelvic pain, irritation  History:   History of frequent UTI s: No  History of kidney stones: No  Sexually Active: YES  Possibility of pregnancy: No  Precipitating or alleviating factors: APAP, Ibuprofen help  Therapies tried and outcome: Increase fluid intake and OTC advil or tylenol         Review of Systems  CONSTITUTIONAL: NEGATIVE for fever, chills, change in weight  INTEGUMENTARY/SKIN: NEGATIVE for worrisome rashes, moles or lesions  RESP: NEGATIVE for significant cough or SOB  CV: NEGATIVE for chest pain, palpitations or peripheral edema  GI: constipation  : IUD no menstrual cycle, urinary frequency - radiating irritation to right groin area       Objective    /78   Pulse 84   Temp 98.6  F (37  C) (Tympanic)   Resp 16   Ht 1.702 m (5' 7\")   Wt 81.4 kg (179 lb 6.4 oz)   SpO2 99%   BMI 28.10 kg/m    Body mass index is 28.1 kg/m .  Physical Exam   GENERAL: alert and no distress  RESP: lungs clear to auscultation - no rales, rhonchi or wheezes  CV: regular rate and rhythm, normal S1 S2, no S3 or S4, no murmur, click or rub, no peripheral edema  PSYCH: mentation appears normal and anxious    Lab on 07/30/2024   Component Date Value Ref Range Status    Hold Specimen 07/30/2024 [facility]   Final    Color Urine 07/30/2024 Straw  Colorless, Straw, Light Yellow, Yellow Final    Appearance Urine 07/30/2024 Clear  Clear Final    Glucose Urine 07/30/2024 Negative  Negative mg/dL Final    Bilirubin Urine 07/30/2024 Negative  Negative Final    Ketones Urine 07/30/2024 Negative  Negative mg/dL Final    Specific Gravity Urine 07/30/2024 1.006  1.003 - 1.035 Final    Blood Urine 07/30/2024 Negative  Negative Final    pH Urine 07/30/2024 7.0  4.7 - 8.0 Final    Protein Albumin Urine " 07/30/2024 Negative  Negative mg/dL Final    Urobilinogen Urine 07/30/2024 Normal  Normal, 2.0 mg/dL Final    Nitrite Urine 07/30/2024 Negative  Negative Final    Leukocyte Esterase Urine 07/30/2024 Negative  Negative Final    RBC Urine 07/30/2024 <1  <=2 /HPF Final    WBC Urine 07/30/2024 0  <=5 /HPF Final    Squamous Epithelials Urine 07/30/2024 0  <=1 /HPF Final           Signed Electronically by: REYNA Armenta CNP

## 2024-07-31 NOTE — PATIENT INSTRUCTIONS
Liquid IV  LMNT   To make your own add salt an cream of tartar to water for and electrolyte drink   Cranberry tables     Can try vitamin E for night sweats     Constipation - take miralax at night if no bowel movement during the day

## 2024-08-02 LAB — FSH SERPL IRP2-ACNC: 4.2 MIU/ML

## 2024-08-06 ENCOUNTER — MYC MEDICAL ADVICE (OUTPATIENT)
Dept: FAMILY MEDICINE | Facility: OTHER | Age: 44
End: 2024-08-06

## 2024-08-06 ENCOUNTER — HOSPITAL ENCOUNTER (OUTPATIENT)
Dept: ULTRASOUND IMAGING | Facility: HOSPITAL | Age: 44
Discharge: HOME | End: 2024-08-06
Attending: NURSE PRACTITIONER | Admitting: NURSE PRACTITIONER
Payer: COMMERCIAL

## 2024-08-06 DIAGNOSIS — N83.8 OVARIAN MASS: Primary | ICD-10-CM

## 2024-08-06 DIAGNOSIS — Z87.42 HISTORY OF OVARIAN CYST: ICD-10-CM

## 2024-08-06 PROCEDURE — 76830 TRANSVAGINAL US NON-OB: CPT

## 2024-08-06 NOTE — PROGRESS NOTES
Discussed results with Sheridan - new mass noted on the left ovary.  She is not having pain on this side.  She did have previous pain on the right side.  Referral to OB/GYN for further evaluation and treatment plan     Chelsie ORELLANA FNP-BC  Family Nurse Practitioner

## 2024-08-12 ENCOUNTER — OFFICE VISIT (OUTPATIENT)
Dept: OBGYN | Facility: OTHER | Age: 44
End: 2024-08-12
Attending: OBSTETRICS & GYNECOLOGY
Payer: COMMERCIAL

## 2024-08-12 VITALS
DIASTOLIC BLOOD PRESSURE: 80 MMHG | TEMPERATURE: 98.9 F | HEIGHT: 66 IN | RESPIRATION RATE: 16 BRPM | WEIGHT: 178 LBS | BODY MASS INDEX: 28.61 KG/M2 | OXYGEN SATURATION: 99 % | HEART RATE: 97 BPM | SYSTOLIC BLOOD PRESSURE: 132 MMHG

## 2024-08-12 DIAGNOSIS — N83.8 OVARIAN MASS: Primary | ICD-10-CM

## 2024-08-12 DIAGNOSIS — Z87.42 HISTORY OF OVARIAN CYST: ICD-10-CM

## 2024-08-12 PROCEDURE — 99213 OFFICE O/P EST LOW 20 MIN: CPT | Performed by: OBSTETRICS & GYNECOLOGY

## 2024-08-12 ASSESSMENT — PAIN SCALES - GENERAL: PAINLEVEL: NO PAIN (0)

## 2024-08-12 NOTE — PROGRESS NOTES
CC:  Consult from RAGHU Delcid for ovarian mass.   HPI:  Sheridan Rodriges is a 43 year old female is a   P2.  No LMP recorded. (Menstrual status: IUD).  Menses are rare, on Mirena IUD  Pt was complaining of intermittent  right low back/hip/groin pain so a pelvic US was ordered as she has a h/o right ovarian cyst.  US showed left ovarian cyst 3cm  with mild septation or debri consistent with hemorrhagic cyst or corpus luteum.  Denies Left sided pelvic pain.      Patients records are available and reviewed at today's visit.    Past GYN history:  No STD history       Last PAP smear:  Normal      Past Medical History:   Diagnosis Date    Anxiety state, unspecified 03/12/2007    Chronic tonsillitis 04/15/2015    RC II (cervical intraepithelial neoplasia II) 06/13/2016    LEEP=cervicitis only    Galactorrhea not associated with childbirth 05/10/2005    Goiter 01/08/2014    High risk HPV infection 05/24/2016    other    Mastodynia 01/08/2014    Multiple joint pain 08/22/2018    Papanicolaou smear of cervix with atypical squamous cells of undetermined significance (ASC-US) 05/31/2017    Neg hr hpv    Papanicolaou smear of cervix with low grade squamous intraepithelial lesion (LGSIL) 05/23/2016    Perineal lump 12/01/2020    Tonsillar hypertrophy 04/15/2015       Past Surgical History:   Procedure Laterality Date    ABDOMINOPLASTY      BIOPSY BREAST      BUNIONECTOMY Right 10/29/2018    Procedure: RIGHT 1ST METATARSOPHALANGEAL JOINT CHEILECTOMY WITH IMPLANT;  Surgeon: Katty Schroeder DPM;  Location: [facility]    CHEILECTOMY Right 10/29/2018    Procedure: CHEILECTOMY;  Surgeon: Katty Schroeder DPM;  Location: [facility]    CONIZATION LEEP N/A 08/01/2016    Procedure: CONIZATION LEEP;  Surgeon: Chioma Wiggins MD;  Location: [facility]    LAPAROSCOPIC SALPINGECTOMY Bilateral 11/22/2017    Procedure: LAPAROSCOPIC SALPINGECTOMY;  LAPAROSCOPIC BILATERAL SALPINGECTOMY;  Surgeon: Kalin Hernandez MD;  Location: [facility]     "MAMMOPLASTY AUGMENTATION      MAMMOPLASTY AUGMENTATION BILATERAL         Family History   Problem Relation Age of Onset    Hypertension Father     Other - See Comments Father         lymphoma    Uterine Cancer Cousin        Allergies: Erythromycin and Penicillins    Current Outpatient Medications   Medication Sig Dispense Refill    acyclovir (ZOVIRAX) 400 MG tablet TAKE 1 TABLET BY MOUTH EVERY 8 HOURS 30 tablet 0    buPROPion (WELLBUTRIN XL) 300 MG 24 hr tablet TAKE 1 TABLET EVERY MORNING 90 tablet 1    hydrOXYzine HCl (ATARAX) 10 MG tablet Take 1-2 tablets (10-20 mg) by mouth 3 times daily as needed for anxiety 60 tablet 1    levothyroxine (SYNTHROID/LEVOTHROID) 75 MCG tablet TAKE 1 TABLET BY MOUTH DAILY 90 tablet 0       ROS:  CONSTITUTIONAL: NEGATIVE for fever, chills, change in weight  GI: NEGATIVE for nausea, abdominal pain, heartburn, or change in bowel habits  : NEGATIVE for frequency, dysuria, hematuria, vaginal discharge      EXAM:  Blood pressure 132/80, pulse 97, temperature 98.9  F (37.2  C), temperature source Tympanic, resp. rate 16, height 1.676 m (5' 6\"), weight 80.7 kg (178 lb), SpO2 99%, unknown if currently breastfeeding.   BMI= Body mass index is 28.73 kg/m .  General - pleasant female in no acute distress.  Musculoskeletal - no gross deformities.  Neurological - normal mental status.  Extremities:  No edema  Narrative & Impression      PROCEDURE: US PELVIC TRANSABDOMINAL AND TRANSVAGINAL     HISTORY: History of ovarian cyst     TECHNIQUE: Transabdominal and transvaginal ultrasound of the pelvis.     COMPARISON: none     FINDINGS:      MEASUREMENTS:  Uterus: 9.1 x 4.5 x 5.9 cm (Length x Height x Width)  Endometrium: 63 mm in thickness  Right Ovary: 2.7 x 1.2 x 2 cm (Length x Height x Width)  Left Ovary:  4.4 x 2.4 x 3.7 cm (Length x Height x Width)     UTERUS: There is an IUD seen centrally within the uterus. No uterine  masses are noted.     ADNEXA: The right ovary appears normal with normal " arterial and venous  flow. There is a 3 cm in diameter left ovarian mass with internal  septations. Arterial and venous flow was identified within the left  ovary     MISC: Trace free fluid is seen in the cul-de-sac                                                                      IMPRESSION:      3 cm in diameter hypoechoic mass in the left ovary with internal  septations          ASSESSMENT/PLAN:  Left ovarian cyst.  Asymptomatic.  Consistent with benign physiologic/CL/hemorrhagic cyst.   Would recommend f/up US in 6 weeks, however, to ensure it is resolving. If not would discuss surgical removal.  Discussed ovarian cysts, need for f/up, ovarian torsion precautions.  Will call with results when available.  Pt has my card and phone number to call as needed if problems in the interim or she does not hear her results.          Kalin Hernandez MD

## 2024-08-31 LAB — NONINV COLON CA DNA+OCC BLD SCRN STL QL: NEGATIVE

## 2024-09-06 ENCOUNTER — LAB (OUTPATIENT)
Dept: LAB | Facility: OTHER | Age: 44
End: 2024-09-06
Payer: COMMERCIAL

## 2024-09-06 DIAGNOSIS — Z11.3 SCREEN FOR STD (SEXUALLY TRANSMITTED DISEASE): Primary | ICD-10-CM

## 2024-09-06 DIAGNOSIS — N89.8 VAGINAL DISCHARGE: ICD-10-CM

## 2024-09-06 DIAGNOSIS — Z11.3 SCREEN FOR STD (SEXUALLY TRANSMITTED DISEASE): ICD-10-CM

## 2024-09-06 LAB
BACTERIAL VAGINOSIS VAG-IMP: NEGATIVE
C TRACH DNA SPEC QL PROBE+SIG AMP: NEGATIVE
CANDIDA DNA VAG QL NAA+PROBE: NOT DETECTED
CANDIDA GLABRATA / CANDIDA KRUSEI DNA: NOT DETECTED
N GONORRHOEA DNA SPEC QL NAA+PROBE: NEGATIVE
T VAGINALIS DNA VAG QL NAA+PROBE: NOT DETECTED

## 2024-09-06 PROCEDURE — 87491 CHLMYD TRACH DNA AMP PROBE: CPT

## 2024-09-06 PROCEDURE — 0352U MULTIPLEX VAGINAL PANEL BY PCR: CPT

## 2024-09-06 PROCEDURE — 87591 N.GONORRHOEAE DNA AMP PROB: CPT

## 2024-09-10 ENCOUNTER — ANCILLARY PROCEDURE (OUTPATIENT)
Dept: MAMMOGRAPHY | Facility: OTHER | Age: 44
End: 2024-09-10
Attending: NURSE PRACTITIONER
Payer: COMMERCIAL

## 2024-09-10 ENCOUNTER — TELEPHONE (OUTPATIENT)
Dept: MAMMOGRAPHY | Facility: OTHER | Age: 44
End: 2024-09-10

## 2024-09-10 DIAGNOSIS — Z12.31 VISIT FOR SCREENING MAMMOGRAM: ICD-10-CM

## 2024-09-10 PROCEDURE — 77063 BREAST TOMOSYNTHESIS BI: CPT | Mod: TC | Performed by: RADIOLOGY

## 2024-09-10 PROCEDURE — 77067 SCR MAMMO BI INCL CAD: CPT | Mod: TC | Performed by: RADIOLOGY

## 2024-09-17 DIAGNOSIS — F32.A ANXIETY AND DEPRESSION: ICD-10-CM

## 2024-09-17 DIAGNOSIS — F41.9 ANXIETY AND DEPRESSION: ICD-10-CM

## 2024-09-17 DIAGNOSIS — E04.9 GOITER: ICD-10-CM

## 2024-09-17 RX ORDER — LEVOTHYROXINE SODIUM 75 UG/1
75 TABLET ORAL DAILY
Qty: 90 TABLET | Refills: 0 | Status: SHIPPED | OUTPATIENT
Start: 2024-09-17

## 2024-09-17 RX ORDER — BUPROPION HYDROCHLORIDE 300 MG/1
300 TABLET ORAL EVERY MORNING
Qty: 90 TABLET | Refills: 0 | Status: SHIPPED | OUTPATIENT
Start: 2024-09-17

## 2024-09-17 NOTE — TELEPHONE ENCOUNTER
Levothyroxine 75 MCG       Last Written Prescription Date:  06/17/24  Last Fill Quantity: 90,   # refills: 0  Last Office Visit: 07/31/24  Future Office visit:       Routing refill request to provider for review/approval because:  Medication indicated for associated diagnosis    Medication is associated with one or more of the following diagnoses:  Hypothyroidism  Thyroid stimulating hormone suppression therapy  Thyroid cancer  Acquired atrophy of thyroid

## 2024-12-02 ENCOUNTER — MYC MEDICAL ADVICE (OUTPATIENT)
Dept: OBGYN | Facility: OTHER | Age: 44
End: 2024-12-02

## 2024-12-04 ENCOUNTER — HOSPITAL ENCOUNTER (OUTPATIENT)
Dept: ULTRASOUND IMAGING | Facility: HOSPITAL | Age: 44
Discharge: HOME | End: 2024-12-04
Attending: OBSTETRICS & GYNECOLOGY
Payer: COMMERCIAL

## 2024-12-04 DIAGNOSIS — N83.8 OVARIAN MASS: ICD-10-CM

## 2024-12-04 DIAGNOSIS — Z87.42 HISTORY OF OVARIAN CYST: ICD-10-CM

## 2024-12-04 PROCEDURE — 76856 US EXAM PELVIC COMPLETE: CPT

## 2025-01-02 ENCOUNTER — MYC REFILL (OUTPATIENT)
Dept: FAMILY MEDICINE | Facility: OTHER | Age: 45
End: 2025-01-02

## 2025-01-02 DIAGNOSIS — F41.9 ANXIETY AND DEPRESSION: ICD-10-CM

## 2025-01-02 DIAGNOSIS — E04.9 GOITER: ICD-10-CM

## 2025-01-02 DIAGNOSIS — F32.A ANXIETY AND DEPRESSION: ICD-10-CM

## 2025-01-02 RX ORDER — LEVOTHYROXINE SODIUM 75 UG/1
75 TABLET ORAL DAILY
Qty: 90 TABLET | Refills: 0 | OUTPATIENT
Start: 2025-01-02

## 2025-01-02 RX ORDER — BUPROPION HYDROCHLORIDE 300 MG/1
300 TABLET ORAL EVERY MORNING
Qty: 90 TABLET | Refills: 0 | OUTPATIENT
Start: 2025-01-02

## 2025-03-26 ENCOUNTER — LAB REQUISITION (OUTPATIENT)
Dept: LAB | Facility: CLINIC | Age: 45
End: 2025-03-26
Payer: COMMERCIAL

## 2025-03-26 DIAGNOSIS — D48.5 NEOPLASM OF UNCERTAIN BEHAVIOR OF SKIN: ICD-10-CM

## 2025-03-26 PROCEDURE — 88305 TISSUE EXAM BY PATHOLOGIST: CPT | Mod: TC,ORL | Performed by: DERMATOLOGY

## 2025-03-26 PROCEDURE — 88305 TISSUE EXAM BY PATHOLOGIST: CPT | Mod: 26 | Performed by: DERMATOLOGY

## 2025-03-30 DIAGNOSIS — F32.A ANXIETY AND DEPRESSION: ICD-10-CM

## 2025-03-30 DIAGNOSIS — F41.9 ANXIETY AND DEPRESSION: ICD-10-CM

## 2025-03-30 DIAGNOSIS — E04.9 GOITER: ICD-10-CM

## 2025-03-30 LAB
PATH REPORT.COMMENTS IMP SPEC: NORMAL
PATH REPORT.COMMENTS IMP SPEC: NORMAL
PATH REPORT.FINAL DX SPEC: NORMAL
PATH REPORT.GROSS SPEC: NORMAL
PATH REPORT.MICROSCOPIC SPEC OTHER STN: NORMAL
PATH REPORT.RELEVANT HX SPEC: NORMAL

## 2025-03-31 RX ORDER — BUPROPION HYDROCHLORIDE 300 MG/1
300 TABLET ORAL EVERY MORNING
Qty: 90 TABLET | Refills: 0 | Status: SHIPPED | OUTPATIENT
Start: 2025-03-31

## 2025-03-31 RX ORDER — LEVOTHYROXINE SODIUM 75 UG/1
75 TABLET ORAL DAILY
Qty: 90 TABLET | Refills: 0 | Status: SHIPPED | OUTPATIENT
Start: 2025-03-31

## 2025-03-31 NOTE — TELEPHONE ENCOUNTER
buPROPion (WELLBUTRIN XL) 300 MG 24 hr tablet         Last Written Prescription Date:  1/2/25  Last Fill Quantity: 90,   # refills: 0  Last Office Visit: 7/31/24  Future Office visit:       Routing refill request to provider for review/approval because:  Rx Protocol Bupropion Failed      PHQ-9 score of less than 5 in past 6 months    Please review last PHQ-9 score.        9/1/2023     8:00 AM 6/25/2024    10:58 AM 7/16/2024     4:25 PM   PHQ-9 SCORE   PHQ-9 Total Score MyChart   2 (Minimal depression)     PHQ-9 Total Score 2 2 3          levothyroxine (SYNTHROID/LEVOTHROID) 75 MCG tablet       Last Written Prescription Date:  1/3/25  Last Fill Quantity: 90,   # refills: 0  Last Office Visit: 7/31/24  Future Office visit:

## 2025-05-23 ENCOUNTER — MYC MEDICAL ADVICE (OUTPATIENT)
Dept: FAMILY MEDICINE | Facility: OTHER | Age: 45
End: 2025-05-23

## 2025-05-27 ENCOUNTER — OFFICE VISIT (OUTPATIENT)
Dept: FAMILY MEDICINE | Facility: OTHER | Age: 45
End: 2025-05-27
Attending: NURSE PRACTITIONER
Payer: COMMERCIAL

## 2025-05-27 ENCOUNTER — LAB (OUTPATIENT)
Dept: LAB | Facility: OTHER | Age: 45
End: 2025-05-27
Payer: COMMERCIAL

## 2025-05-27 VITALS
DIASTOLIC BLOOD PRESSURE: 86 MMHG | RESPIRATION RATE: 18 BRPM | BODY MASS INDEX: 29.89 KG/M2 | SYSTOLIC BLOOD PRESSURE: 112 MMHG | HEART RATE: 86 BPM | OXYGEN SATURATION: 98 % | TEMPERATURE: 98.9 F | WEIGHT: 186 LBS | HEIGHT: 66 IN

## 2025-05-27 DIAGNOSIS — R30.0 DYSURIA: ICD-10-CM

## 2025-05-27 DIAGNOSIS — N89.8 VAGINAL IRRITATION: ICD-10-CM

## 2025-05-27 DIAGNOSIS — R30.0 DYSURIA: Primary | ICD-10-CM

## 2025-05-27 LAB
ALBUMIN SERPL BCG-MCNC: 4 G/DL (ref 3.5–5.2)
ALBUMIN UR-MCNC: NEGATIVE MG/DL
ALP SERPL-CCNC: 62 U/L (ref 40–150)
ALT SERPL W P-5'-P-CCNC: 14 U/L (ref 0–50)
ANION GAP SERPL CALCULATED.3IONS-SCNC: 11 MMOL/L (ref 7–15)
APPEARANCE UR: CLEAR
AST SERPL W P-5'-P-CCNC: 18 U/L (ref 0–45)
BACTERIA #/AREA URNS HPF: ABNORMAL /HPF
BACTERIAL VAGINOSIS VAG-IMP: NEGATIVE
BASOPHILS # BLD AUTO: 0 10E3/UL (ref 0–0.2)
BASOPHILS NFR BLD AUTO: 0 %
BILIRUB SERPL-MCNC: 0.4 MG/DL
BILIRUB UR QL STRIP: NEGATIVE
BUN SERPL-MCNC: 10.9 MG/DL (ref 6–20)
CALCIUM SERPL-MCNC: 9.1 MG/DL (ref 8.8–10.4)
CANDIDA DNA VAG QL NAA+PROBE: NOT DETECTED
CANDIDA GLABRATA / CANDIDA KRUSEI DNA: NOT DETECTED
CHLORIDE SERPL-SCNC: 102 MMOL/L (ref 98–107)
COLOR UR AUTO: ABNORMAL
CREAT SERPL-MCNC: 0.8 MG/DL (ref 0.51–0.95)
EGFRCR SERPLBLD CKD-EPI 2021: >90 ML/MIN/1.73M2
EOSINOPHIL # BLD AUTO: 0.1 10E3/UL (ref 0–0.7)
EOSINOPHIL NFR BLD AUTO: 1 %
ERYTHROCYTE [DISTWIDTH] IN BLOOD BY AUTOMATED COUNT: 12.8 % (ref 10–15)
GLUCOSE SERPL-MCNC: 90 MG/DL (ref 70–99)
GLUCOSE UR STRIP-MCNC: NEGATIVE MG/DL
HCO3 SERPL-SCNC: 24 MMOL/L (ref 22–29)
HCT VFR BLD AUTO: 38.9 % (ref 35–47)
HGB BLD-MCNC: 13.6 G/DL (ref 11.7–15.7)
HGB UR QL STRIP: NEGATIVE
IMM GRANULOCYTES # BLD: 0.1 10E3/UL
IMM GRANULOCYTES NFR BLD: 1 %
KETONES UR STRIP-MCNC: NEGATIVE MG/DL
LEUKOCYTE ESTERASE UR QL STRIP: ABNORMAL
LYMPHOCYTES # BLD AUTO: 2.3 10E3/UL (ref 0.8–5.3)
LYMPHOCYTES NFR BLD AUTO: 30 %
MCH RBC QN AUTO: 29.9 PG (ref 26.5–33)
MCHC RBC AUTO-ENTMCNC: 35 G/DL (ref 31.5–36.5)
MCV RBC AUTO: 86 FL (ref 78–100)
MONOCYTES # BLD AUTO: 0.6 10E3/UL (ref 0–1.3)
MONOCYTES NFR BLD AUTO: 8 %
NEUTROPHILS # BLD AUTO: 4.7 10E3/UL (ref 1.6–8.3)
NEUTROPHILS NFR BLD AUTO: 61 %
NITRATE UR QL: NEGATIVE
NRBC # BLD AUTO: 0 10E3/UL
NRBC BLD AUTO-RTO: 0 /100
PH UR STRIP: 6.5 [PH] (ref 4.7–8)
PLATELET # BLD AUTO: 248 10E3/UL (ref 150–450)
POTASSIUM SERPL-SCNC: 3.7 MMOL/L (ref 3.4–5.3)
PROT SERPL-MCNC: 6.7 G/DL (ref 6.4–8.3)
RBC # BLD AUTO: 4.55 10E6/UL (ref 3.8–5.2)
RBC URINE: 1 /HPF
SODIUM SERPL-SCNC: 137 MMOL/L (ref 135–145)
SP GR UR STRIP: 1.01 (ref 1–1.03)
SQUAMOUS EPITHELIAL: 2 /HPF
T VAGINALIS DNA VAG QL NAA+PROBE: NOT DETECTED
UROBILINOGEN UR STRIP-MCNC: NORMAL MG/DL
WBC # BLD AUTO: 7.7 10E3/UL (ref 4–11)
WBC URINE: 20 /HPF

## 2025-05-27 PROCEDURE — 87186 SC STD MICRODIL/AGAR DIL: CPT

## 2025-05-27 PROCEDURE — 87086 URINE CULTURE/COLONY COUNT: CPT

## 2025-05-27 PROCEDURE — 81515 NFCT DS BV&VAGINITIS DNA ALG: CPT

## 2025-05-27 PROCEDURE — 81001 URINALYSIS AUTO W/SCOPE: CPT

## 2025-05-27 ASSESSMENT — PAIN SCALES - GENERAL: PAINLEVEL_OUTOF10: NO PAIN (0)

## 2025-05-27 NOTE — PROGRESS NOTES
"  Assessment & Plan     Dysuria  Abnormal UA - culture pending   Waiting on culture before treating  Possible bladder irritation after recent UTI   - UA with Microscopic reflex to Culture - [place]; Future  - Comprehensive metabolic panel (BMP + Alb, Alk Phos, ALT, AST, Total. Bili, TP); Future  - CBC with platelets and differential; Future  - Comprehensive metabolic panel (BMP + Alb, Alk Phos, ALT, AST, Total. Bili, TP)  - CBC with platelets and differential    Vaginal irritation  Negative for bacterial or yeast infection  - Multiplex Vaginal Panel by PCR; Future  - Comprehensive metabolic panel (BMP + Alb, Alk Phos, ALT, AST, Total. Bili, TP); Future  - CBC with platelets and differential; Future  - Comprehensive metabolic panel (BMP + Alb, Alk Phos, ALT, AST, Total. Bili, TP)  - CBC with platelets and differential    Reviewed labs.  No concerns for sever infection at this time with normal CBC  Waiting on urine culture to treat.  Was recently treated with macrobid for 5 days       BMI  Estimated body mass index is 30.02 kg/m  as calculated from the following:    Height as of this encounter: 1.676 m (5' 6\").    Weight as of this encounter: 84.4 kg (186 lb).   Weight management plan: Discussed healthy diet and exercise guidelines      Follow-up   No follow-ups on file.        Subjective   Sheridan is a 44 year old, presenting for the following health issues:  UTI        5/27/2025     2:59 PM   Additional Questions   Roomed by Winsome SKELTON   Accompanied by self     History of Present Illness       Reason for visit:  Pain with urination  Symptom onset:  1-2 weeks ago  Symptom intensity:  Moderate  Symptom progression:  Staying the same  Had these symptoms before:  No  What makes it worse:  No  What makes it better:  No   She is taking medications regularly.        Genitourinary - Female  Onset/Duration: 05/16/2025 was treated for a UTI while on a cruise.   Description:   Painful urination (Dysuria): YES- once bladder " "is empty.            Frequency: YES- and a urgency to go   Blood in urine (Hematuria): YES- said that there was some in sample that was taken on cruise but nothing seen now.   Delay in urine (Hesitency): No  Intensity: moderate  Progression of Symptoms:  same  Accompanying Signs & Symptoms:  Fever/chills: No  Flank pain: YES- off and on- unknown if related.   Nausea and vomiting: No  Vaginal symptoms: none  Abdominal/Pelvic Pain: YES- sore in lower abdomen- when pushing on it as well.   History:   History of frequent UTI s: No  History of kidney stones: No  Sexually Active: YES  Possibility of pregnancy: No  Precipitating or alleviating factors: None  Therapies tried and outcome: nitrofurantoin 5 days - completed   Cranberry tablets - not helping         Review of Systems  CONSTITUTIONAL: NEGATIVE for fever, chills, change in weight  RESP: NEGATIVE for significant cough or SOB  CV: NEGATIVE for chest pain, palpitations or peripheral edema  GI: abdominal pain suprapubic  : dysuria and vaginal irritation       Objective    /86 (BP Location: Right arm, Patient Position: Sitting, Cuff Size: Adult Regular)   Pulse 86   Temp 98.9  F (37.2  C) (Tympanic)   Resp 18   Ht 1.676 m (5' 6\")   Wt 84.4 kg (186 lb)   SpO2 98%   BMI 30.02 kg/m    Body mass index is 30.02 kg/m .  Physical Exam   GENERAL: alert and no distress  RESP: lungs clear to auscultation - no rales, rhonchi or wheezes  CV: regular rate and rhythm, normal S1 S2, no S3 or S4, no murmur, click or rub, no peripheral edema  ABDOMEN: tenderness suprapubic and bowel sounds normal    Results for orders placed or performed in visit on 05/27/25   Comprehensive metabolic panel (BMP + Alb, Alk Phos, ALT, AST, Total. Bili, TP)     Status: Normal   Result Value Ref Range    Sodium 137 135 - 145 mmol/L    Potassium 3.7 3.4 - 5.3 mmol/L    Carbon Dioxide (CO2) 24 22 - 29 mmol/L    Anion Gap 11 7 - 15 mmol/L    Urea Nitrogen 10.9 6.0 - 20.0 mg/dL    Creatinine " 0.80 0.51 - 0.95 mg/dL    GFR Estimate >90 >60 mL/min/1.73m2    Calcium 9.1 8.8 - 10.4 mg/dL    Chloride 102 98 - 107 mmol/L    Glucose 90 70 - 99 mg/dL    Alkaline Phosphatase 62 40 - 150 U/L    AST 18 0 - 45 U/L    ALT 14 0 - 50 U/L    Protein Total 6.7 6.4 - 8.3 g/dL    Albumin 4.0 3.5 - 5.2 g/dL    Bilirubin Total 0.4 <=1.2 mg/dL   CBC with platelets and differential     Status: None   Result Value Ref Range    WBC Count 7.7 4.0 - 11.0 10e3/uL    RBC Count 4.55 3.80 - 5.20 10e6/uL    Hemoglobin 13.6 11.7 - 15.7 g/dL    Hematocrit 38.9 35.0 - 47.0 %    MCV 86 78 - 100 fL    MCH 29.9 26.5 - 33.0 pg    MCHC 35.0 31.5 - 36.5 g/dL    RDW 12.8 10.0 - 15.0 %    Platelet Count 248 150 - 450 10e3/uL    % Neutrophils 61 %    % Lymphocytes 30 %    % Monocytes 8 %    % Eosinophils 1 %    % Basophils 0 %    % Immature Granulocytes 1 %    NRBCs per 100 WBC 0 <1 /100    Absolute Neutrophils 4.7 1.6 - 8.3 10e3/uL    Absolute Lymphocytes 2.3 0.8 - 5.3 10e3/uL    Absolute Monocytes 0.6 0.0 - 1.3 10e3/uL    Absolute Eosinophils 0.1 0.0 - 0.7 10e3/uL    Absolute Basophils 0.0 0.0 - 0.2 10e3/uL    Absolute Immature Granulocytes 0.1 <=0.4 10e3/uL    Absolute NRBCs 0.0 10e3/uL   CBC with platelets and differential     Status: None    Narrative    The following orders were created for panel order CBC with platelets and differential.  Procedure                               Abnormality         Status                     ---------                               -----------         ------                     CBC with platelets and ...[[id number]]                      Final result                 Please view results for these tests on the individual orders.   Results for orders placed or performed in visit on 05/27/25   UA with Microscopic reflex to Culture - [place]     Status: Abnormal    Specimen: Urine, Midstream   Result Value Ref Range    Color Urine Straw Colorless, Straw, Light Yellow, Yellow    Appearance Urine Clear Clear     Glucose Urine Negative Negative mg/dL    Bilirubin Urine Negative Negative    Ketones Urine Negative Negative mg/dL    Specific Gravity Urine 1.007 1.003 - 1.035    Blood Urine Negative Negative    pH Urine 6.5 4.7 - 8.0    Protein Albumin Urine Negative Negative mg/dL    Urobilinogen Urine Normal Normal mg/dL    Nitrite Urine Negative Negative    Leukocyte Esterase Urine Moderate (A) Negative    Bacteria Urine Few (A) None Seen /HPF    RBC Urine 1 <=2 /HPF    WBC Urine 20 (H) <=5 /HPF    Squamous Epithelials Urine 2 (H) <=1 /HPF    Narrative    Urine Culture ordered based on laboratory criteria   Multiplex Vaginal Panel by PCR     Status: Normal    Specimen: Vagina; Swab   Result Value Ref Range    Bacterial Vaginosis Organism DNA Negative Negative    Candida Group DNA Not Detected Not Detected    Candida glabrata / Susanna krusei DNA Not Detected Not Detected    Trichomonas vaginalis DNA Not Detected Not Detected    Narrative    The Xpert  Xpress MVP test, performed on the [employer]  Instrument Systems, is an automated, qualitative in vitro diagnostic test for the detection of DNA targets from anaerobic bacteria associated with bacterial vaginosis, Candida species associated with vulvovaginal candidiasis, and Trichomonas vaginalis. The assay uses clinician-collected and self-collected vaginal swabs from patients who are symptomatic for vaginitis/ vaginosis. The Xpert  Xpress MVP test utilizes real-time polymerase chain reaction (PCR) for the amplification of specific DNA targets and utilizes fluorogenic target-specific hybridization probes to detect and differentiate DNA. It is intended to aid in the diagnosis of vaginal infections in women with a clinical presentation consistent with bacterial vaginosis, vulvovaginal candidiasis, or trichomoniasis.   The assay targets three anaerobic microorgansims that are associated with bacterial vaginosis (BV). Other organisms that are not detected by the Xpert  Xpress MVP  test have also been reported to be associated with BV. The BV organism and Candida species targets of the Xpert  Xpress MVP test can be commensal in women; positive results must be considered in conjunction with other clinical and patient information to determine the disease status.           Signed Electronically by: REYNA Armenta CNP

## 2025-05-27 NOTE — TELEPHONE ENCOUNTER
8:04 AM    Reason for Call: OVERBOOK    Patient is having the following symptoms: UTI for 7 days.    The patient is requesting an appointment for exam with HAKAN Delcid.    Was an appointment offered for this call? No, nothing was available for today.  If yes : Appointment type              Date    Preferred method for responding to this message: Telephone Call  What is your phone number ?  [phone]     If we cannot reach you directly, may we leave a detailed response at the number you provided? Yes    Can this message wait until your PCP/provider returns, if unavailable today? Not applicable    Yolanda Chavez

## 2025-05-29 ENCOUNTER — MYC MEDICAL ADVICE (OUTPATIENT)
Dept: FAMILY MEDICINE | Facility: OTHER | Age: 45
End: 2025-05-29

## 2025-05-29 DIAGNOSIS — N30.00 ACUTE CYSTITIS WITHOUT HEMATURIA: Primary | ICD-10-CM

## 2025-05-29 LAB — BACTERIA UR CULT: ABNORMAL

## 2025-05-29 RX ORDER — SULFAMETHOXAZOLE AND TRIMETHOPRIM 800; 160 MG/1; MG/1
1 TABLET ORAL 2 TIMES DAILY
Qty: 6 TABLET | Refills: 0 | Status: SHIPPED | OUTPATIENT
Start: 2025-05-29 | End: 2025-06-01

## 2025-05-29 NOTE — TELEPHONE ENCOUNTER
Message being routed to covering provider due to PCP being out of office. Patient sent [place] message and phone call.   Pharmacy pended.

## 2025-05-29 NOTE — TELEPHONE ENCOUNTER
Results requested: results from ua/ pt wants results before the weekend incase she needs medication    Best number to reach patient at: [phone]     Best time to call patient: asap    Send to care team in basket.

## 2025-06-16 ENCOUNTER — PATIENT OUTREACH (OUTPATIENT)
Dept: CARE COORDINATION | Facility: CLINIC | Age: 45
End: 2025-06-16
Payer: COMMERCIAL

## 2025-06-30 ENCOUNTER — PATIENT OUTREACH (OUTPATIENT)
Dept: CARE COORDINATION | Facility: CLINIC | Age: 45
End: 2025-06-30
Payer: COMMERCIAL

## 2025-07-19 ENCOUNTER — MYC REFILL (OUTPATIENT)
Dept: FAMILY MEDICINE | Facility: OTHER | Age: 45
End: 2025-07-19

## 2025-07-19 DIAGNOSIS — E04.9 GOITER: ICD-10-CM

## 2025-07-19 DIAGNOSIS — F41.9 ANXIETY AND DEPRESSION: ICD-10-CM

## 2025-07-19 DIAGNOSIS — F32.A ANXIETY AND DEPRESSION: ICD-10-CM

## 2025-07-21 RX ORDER — LEVOTHYROXINE SODIUM 75 UG/1
75 TABLET ORAL DAILY
Qty: 90 TABLET | Refills: 0 | Status: SHIPPED | OUTPATIENT
Start: 2025-07-21

## 2025-07-21 RX ORDER — BUPROPION HYDROCHLORIDE 300 MG/1
300 TABLET ORAL EVERY MORNING
Qty: 90 TABLET | Refills: 0 | OUTPATIENT
Start: 2025-07-21

## 2025-07-21 RX ORDER — BUPROPION HYDROCHLORIDE 300 MG/1
300 TABLET ORAL EVERY MORNING
Qty: 90 TABLET | Refills: 0 | Status: SHIPPED | OUTPATIENT
Start: 2025-07-21

## 2025-07-21 RX ORDER — LEVOTHYROXINE SODIUM 75 UG/1
75 TABLET ORAL DAILY
Qty: 90 TABLET | Refills: 0 | OUTPATIENT
Start: 2025-07-21

## 2025-07-21 NOTE — TELEPHONE ENCOUNTER
buPROPion (WELLBUTRIN XL) 300 MG 24 hr tablet       Last Written Prescription Date:  3/31/25  Last Fill Quantity: 90,   # refills: 0  Last Office Visit: 5/27/25  Future Office visit:    Next 5 appointments (look out 90 days)      Jul 30, 2025 2:00 PM  (Arrive by 1:45 PM)  Provider Visit with REYNA Goetz CNP  [facility] ([facility] [address] [facility] [address]  [phone]             Routing refill request to provider for review/approval because:  Rx Protocol Bupropion Failed      PHQ-9 score of less than 5 in past 6 months    Please review last PHQ-9 score.        9/1/2023     8:00 AM 6/25/2024    10:58 AM 7/16/2024     4:25 PM   PHQ-9 SCORE   PHQ-9 Total Score MyChart   2 (Minimal depression)     PHQ-9 Total Score 2 2 3        MAURA-7 on file in the past 12 months    MAURA-7 is only required if the requested medication is prescribed for anxiety.        levothyroxine (SYNTHROID/LEVOTHROID) 75 MCG tablet       Last Written Prescription Date:  3/31/25  Last Fill Quantity: 90,   # refills: 0  Last Office Visit: 5/27/25  Future Office visit:    Next 5 appointments (look out 90 days)      Jul 30, 2025 2:00 PM  (Arrive by 1:45 PM)  Provider Visit with REYNA Goetz CNP  [facility] ([facility] ) [address] [facility] [address]  [phone]             Routing refill request to provider for review/approval because:  Thyroid Protocol Failed      Normal TSH on file in past 12 months        Recent Labs   Lab Test 07/16/24  1339   TSH 2.58

## 2025-07-29 ASSESSMENT — ANXIETY QUESTIONNAIRES
7. FEELING AFRAID AS IF SOMETHING AWFUL MIGHT HAPPEN: NEARLY EVERY DAY
7. FEELING AFRAID AS IF SOMETHING AWFUL MIGHT HAPPEN: NEARLY EVERY DAY
GAD7 TOTAL SCORE: 12
4. TROUBLE RELAXING: NOT AT ALL
GAD7 TOTAL SCORE: 12
GAD7 TOTAL SCORE: 12
5. BEING SO RESTLESS THAT IT IS HARD TO SIT STILL: NOT AT ALL
3. WORRYING TOO MUCH ABOUT DIFFERENT THINGS: NEARLY EVERY DAY
6. BECOMING EASILY ANNOYED OR IRRITABLE: SEVERAL DAYS
8. IF YOU CHECKED OFF ANY PROBLEMS, HOW DIFFICULT HAVE THESE MADE IT FOR YOU TO DO YOUR WORK, TAKE CARE OF THINGS AT HOME, OR GET ALONG WITH OTHER PEOPLE?: SOMEWHAT DIFFICULT
2. NOT BEING ABLE TO STOP OR CONTROL WORRYING: NEARLY EVERY DAY
IF YOU CHECKED OFF ANY PROBLEMS ON THIS QUESTIONNAIRE, HOW DIFFICULT HAVE THESE PROBLEMS MADE IT FOR YOU TO DO YOUR WORK, TAKE CARE OF THINGS AT HOME, OR GET ALONG WITH OTHER PEOPLE: SOMEWHAT DIFFICULT
1. FEELING NERVOUS, ANXIOUS, OR ON EDGE: MORE THAN HALF THE DAYS

## 2025-07-29 ASSESSMENT — PATIENT HEALTH QUESTIONNAIRE - PHQ9
SUM OF ALL RESPONSES TO PHQ QUESTIONS 1-9: 3
10. IF YOU CHECKED OFF ANY PROBLEMS, HOW DIFFICULT HAVE THESE PROBLEMS MADE IT FOR YOU TO DO YOUR WORK, TAKE CARE OF THINGS AT HOME, OR GET ALONG WITH OTHER PEOPLE: SOMEWHAT DIFFICULT
SUM OF ALL RESPONSES TO PHQ QUESTIONS 1-9: 3

## 2025-07-29 NOTE — PROGRESS NOTES
Assessment & Plan     Hypothyroidism, unspecified type  Will update lab  For now continue current dose while waiting for updated TSH level   - TSH with free T4 reflex; Future    Anxiety and depression  Increased stress currently   Continue with wellbutrin   Ok to use hydroxyzine as needed or propranolol during the day   Follow up as needed     MAURA (generalized anxiety disorder)  See above   - hydrOXYzine HCl (ATARAX) 10 MG tablet; Take 1-2 tablets (10-20 mg) by mouth 3 times daily as needed for anxiety.  - propranolol (INDERAL) 10 MG tablet; Take 1 tablet (10 mg) by mouth 3 times daily as needed (anxiety).    Lipid screening  Due for screening   Will notify of labs once available   - Lipid Profile (Chol, Trig, HDL, LDL calc); Future    [place] will come in next week to update labs     The longitudinal plan of care for the diagnosis(es)/condition(s) as documented were addressed during this visit. Due to the added complexity in care, I will continue to support Sheridan in the subsequent management and with ongoing continuity of care.    Ordering of each unique test  I spent a total of 23 minutes on the day of the visit.   Time spent by me today doing chart review, history and exam, documentation and further activities per the note    Follow-up       Subjective   Sheridan is a 44 year old, presenting for the following health issues:  Anxiety, Depression, and Thyroid Problem        7/30/2025     1:53 PM   Additional Questions   Roomed by PAULA Murphy CMA   Accompanied by Self         7/30/2025     1:53 PM   Patient Reported Additional Medications   Patient reports taking the following new medications None     History of Present Illness       Hypothyroidism:     Since last visit, patient describes the following symptoms::  Anxiety and Depression    She eats 2-3 servings of fruits and vegetables daily.She consumes 0 sweetened beverage(s) daily.She exercises with enough effort to increase her heart rate 10 to 19  minutes per day.  She exercises with enough effort to increase her heart rate 3 or less days per week. She is missing 1 dose(s) of medications per week.  She is not taking prescribed medications regularly due to remembering to take.        Depression and Anxiety   How are you doing with your depression since your last visit? No change  How are you doing with your anxiety since your last visit?  Worsened - loss of stress currently   Are you having other symptoms that might be associated with depression or anxiety? No  Have you had a significant life event? OTHER: stress   Do you have any concerns with your use of alcohol or other drugs? No    Social History     Tobacco Use    Smoking status: Former     Current packs/day: 0.00     Types: Cigarettes     Quit date: 2002     Years since quittin.5     Passive exposure: Past    Smokeless tobacco: Never   Vaping Use    Vaping status: Never Used   Substance Use Topics    Alcohol use: Yes     Comment: socially    Drug use: No         2024    10:58 AM 2024     4:25 PM 2025     7:43 PM   PHQ   PHQ-9 Total Score 2 3 3    Q9: Thoughts of better off dead/self-harm past 2 weeks Not at all Not at all Not at all       Patient-reported         2024     9:35 AM 2024     4:25 PM 2025     7:43 PM   MAURA-7 SCORE   Total Score 4 (minimal anxiety)  12 (moderate anxiety)   Total Score 4 4 12        Patient-reported         2025     7:43 PM   Last PHQ-9   1.  Little interest or pleasure in doing things 1   2.  Feeling down, depressed, or hopeless 1   3.  Trouble falling or staying asleep, or sleeping too much 0   4.  Feeling tired or having little energy 0   5.  Poor appetite or overeating 0   6.  Feeling bad about yourself 1   7.  Trouble concentrating 0   8.  Moving slowly or restless 0   Q9: Thoughts of better off dead/self-harm past 2 weeks 0   PHQ-9 Total Score 3        Patient-reported         2025     7:43 PM   MAURA-7    1. Feeling  nervous, anxious, or on edge 2   2. Not being able to stop or control worrying 3   3. Worrying too much about different things 3   4. Trouble relaxing 0   5. Being so restless that it is hard to sit still 0   6. Becoming easily annoyed or irritable 1   7. Feeling afraid, as if something awful might happen 3   MAURA-7 Total Score 12    If you checked any problems, how difficult have they made it for you to do your work, take care of things at home, or get along with other people? Somewhat difficult       Patient-reported       Suicide Assessment Five-step Evaluation and Treatment (SAFE-T)    Hypothyroidism Follow-up  Missed a few doses and will do labs fasting in the near future   Since last visit, patient describes the following symptoms: anxiety        Review of Systems  CONSTITUTIONAL: NEGATIVE for fever, chills, change in weight  RESP: NEGATIVE for significant cough or SOB  CV: NEGATIVE for chest pain, palpitations or peripheral edema  GI: NEGATIVE for nausea, abdominal pain, heartburn, or change in bowel habits  : NEGATIVE for dysuria, frequency, or urgency  NEURO: NEGATIVE for weakness, dizziness or paresthesias  PSYCHIATRIC: high stress at this time       Objective    Pulse 94   Temp 98.6  F (37  C) (Tympanic)   Resp 20   Wt 85.3 kg (188 lb)   SpO2 98%   BMI 30.34 kg/m    Body mass index is 30.34 kg/m .  Physical Exam   GENERAL: alert and no distress  RESP: lungs clear to auscultation - no rales, rhonchi or wheezes  CV: regular rate and rhythm, normal S1 S2, no S3 or S4, no murmur, click or rub, no peripheral edema  PSYCH: mentation appears normal and anxious    Labs ordered         Signed Electronically by: REYNA Armenta CNP

## 2025-07-30 ENCOUNTER — OFFICE VISIT (OUTPATIENT)
Dept: FAMILY MEDICINE | Facility: OTHER | Age: 45
End: 2025-07-30
Attending: NURSE PRACTITIONER
Payer: COMMERCIAL

## 2025-07-30 VITALS
WEIGHT: 188 LBS | TEMPERATURE: 98.6 F | RESPIRATION RATE: 20 BRPM | SYSTOLIC BLOOD PRESSURE: 120 MMHG | DIASTOLIC BLOOD PRESSURE: 90 MMHG | OXYGEN SATURATION: 98 % | BODY MASS INDEX: 30.34 KG/M2 | HEART RATE: 94 BPM

## 2025-07-30 DIAGNOSIS — F41.1 GAD (GENERALIZED ANXIETY DISORDER): ICD-10-CM

## 2025-07-30 DIAGNOSIS — F32.A ANXIETY AND DEPRESSION: ICD-10-CM

## 2025-07-30 DIAGNOSIS — Z13.220 LIPID SCREENING: ICD-10-CM

## 2025-07-30 DIAGNOSIS — F41.9 ANXIETY AND DEPRESSION: ICD-10-CM

## 2025-07-30 DIAGNOSIS — E03.9 HYPOTHYROIDISM, UNSPECIFIED TYPE: Primary | ICD-10-CM

## 2025-07-30 RX ORDER — HYDROXYZINE HYDROCHLORIDE 10 MG/1
10-20 TABLET, FILM COATED ORAL 3 TIMES DAILY PRN
Qty: 60 TABLET | Refills: 1 | Status: SHIPPED | OUTPATIENT
Start: 2025-07-30

## 2025-07-30 RX ORDER — PROPRANOLOL HYDROCHLORIDE 10 MG/1
10 TABLET ORAL 3 TIMES DAILY PRN
Qty: 20 TABLET | Refills: 1 | Status: SHIPPED | OUTPATIENT
Start: 2025-07-30

## 2025-07-30 ASSESSMENT — PAIN SCALES - GENERAL: PAINLEVEL_OUTOF10: NO PAIN (0)

## 2025-07-30 NOTE — PATIENT INSTRUCTIONS
LIFESTYLE CHANGES  Mediterranean style eating/plant based diet     Increase fiber intake   Protein goal (weight/2.2)  X  0.8-1.2     Eat a Healthy diet  Eat more vegetables/plants in your diet (1/2 your food should be vegetables)  Eat health fats  Richmond oil  avocados  Eat healthy proteins  Poultry without the skin  Fish  Limit red meat  Nuts - limit to 1/4 cup per day   Increase physical activity  Slowly work up to 30 minutes of physical activity most days of the week  Aerobic activity 150 minute a week  2 days of resistance exercising   Move every 30-60 minutes         Try daily yoga and meditation and relaxation breathing

## 2025-08-27 ENCOUNTER — LAB (OUTPATIENT)
Dept: LAB | Facility: OTHER | Age: 45
End: 2025-08-27
Payer: COMMERCIAL

## (undated) DEVICE — IRRIGATION-NACL 1000ML

## (undated) DEVICE — TRAY-SKIN PREP POVIDONE/IODINE

## (undated) DEVICE — PUNCTURE CLOSURE DEVICE

## (undated) DEVICE — Device

## (undated) DEVICE — LIGHT HANDLE COVER

## (undated) DEVICE — GLV-7.5 PROTEXIS PI CLASSIC LF/PF

## (undated) DEVICE — DRSG-KERLIX 6 X 6 3/4 FLUFF

## (undated) DEVICE — TUBING-SUCTION 20FT

## (undated) DEVICE — CUFF-DISP STERILE 18IN SINGLE BLADDER

## (undated) DEVICE — GLV-8.5 BIOGEL LATEX

## (undated) DEVICE — CAUTERY PAD-POLYHESIVE II ADULT

## (undated) DEVICE — TOPICAL SKIN ADHESIVE EXOFIN

## (undated) DEVICE — UTERINE MANIPULATOR-KRONNER MANIPUJECTOR

## (undated) DEVICE — BLADE-SCALPEL #15

## (undated) DEVICE — GOWN-SURG XL LVL 3 REINFORCED

## (undated) DEVICE — LIGASURE-5MM BLUNT TIP LAPAROSCOPIC

## (undated) DEVICE — DRSG-ABDOMINAL 5 X 9

## (undated) DEVICE — LABEL-STERILE PREPRINTED FOR OR

## (undated) DEVICE — DRAPE-C-ARM

## (undated) DEVICE — GLV-7.0 BIOGEL PF/LF BLUE INDICATOR UNDERGLOVE

## (undated) DEVICE — BIN-MINI, MAXI, MICRO DRIVER BLADES BIN

## (undated) DEVICE — BDG-COBAN 4 INCH

## (undated) DEVICE — CATH-URETHRAL 14FR

## (undated) DEVICE — DRSG-XEROFORM 1X8

## (undated) DEVICE — TUBING-INSUFFLATION/LAPAROFLATOR W/FILTER

## (undated) DEVICE — SUTURE-ETHILON 4-0 FS-2 662G

## (undated) DEVICE — APPLICATOR-CHLORAPREP 26ML TINTED CHG 2%+ 70% IPA-SURGICAL

## (undated) DEVICE — SCD SLEEVE-THIGH REG.

## (undated) DEVICE — IRRIGATION-H2O 1000ML

## (undated) DEVICE — DRAPE-EXTREMITY SHEET

## (undated) DEVICE — SENSOR-OXISENSOR II ADULT

## (undated) DEVICE — CANISTER-SUCTION 2000CC

## (undated) DEVICE — DRSG-KERLIX ROLL 4.5 X 4.1YD

## (undated) DEVICE — NDL-INSUFFLATION 120MM

## (undated) DEVICE — CLEARIFY VISUALIZATION SYSTEM

## (undated) DEVICE — PACK-SET UP-CUSTOM

## (undated) DEVICE — SUTURE-VICRYL 0 UR-6 J603H

## (undated) DEVICE — BDG-ESMARK 6 INCH X 9 FT

## (undated) DEVICE — NDL-25G 1-1/2" NON-SAFETY

## (undated) DEVICE — SUTURE-VICRYL 4-0 SH J315H

## (undated) DEVICE — BLADE-SAW 18.5MM X 9MM

## (undated) DEVICE — DRSG-SPONGE STERILE 4 X 4

## (undated) DEVICE — DRAPE-STERI 45X60CM #1010

## (undated) DEVICE — BLADE-SURG CLIPPER

## (undated) DEVICE — TROCAR-11X100MM BLADED W/FIXATION

## (undated) DEVICE — TOWEL-OR DISP 4PKS

## (undated) DEVICE — INZII RETRIEVAL SYSTEM-10MM

## (undated) DEVICE — SUTURE-VICRYL 3-0 SH J416H

## (undated) DEVICE — SUCTION-IRRIGATION STRYKEFLOW II (STRYKER)

## (undated) DEVICE — PACK-LAP LAVH-CUSTOM

## (undated) DEVICE — CAUTERY PENCIL

## (undated) DEVICE — COVER-TABLE SHEET

## (undated) DEVICE — BRACE-ANKLE MEDIUM HIGH TOP/LOW PROFILE

## (undated) DEVICE — TROCAR-5X100MM BLADED W/FIXATION

## (undated) DEVICE — SOL-NACL 0.9% 1000ML

## (undated) DEVICE — GLV-6.5 BIOGEL LATEX GEN SURG

## (undated) RX ORDER — PROPOFOL 10 MG/ML
INJECTION, EMULSION INTRAVENOUS
Status: DISPENSED
Start: 2018-10-29

## (undated) RX ORDER — FENTANYL CITRATE 50 UG/ML
INJECTION, SOLUTION INTRAMUSCULAR; INTRAVENOUS
Status: DISPENSED
Start: 2017-11-22

## (undated) RX ORDER — PROPOFOL 10 MG/ML
INJECTION, EMULSION INTRAVENOUS
Status: DISPENSED
Start: 2017-11-22

## (undated) RX ORDER — ROCURONIUM BROMIDE 50 MG/5 ML
SYRINGE (ML) INTRAVENOUS
Status: DISPENSED
Start: 2017-11-22

## (undated) RX ORDER — LIDOCAINE HYDROCHLORIDE 20 MG/ML
INJECTION, SOLUTION EPIDURAL; INFILTRATION; INTRACAUDAL; PERINEURAL
Status: DISPENSED
Start: 2017-11-22

## (undated) RX ORDER — GLYCOPYRROLATE 0.2 MG/ML
INJECTION, SOLUTION INTRAMUSCULAR; INTRAVENOUS
Status: DISPENSED
Start: 2017-11-22

## (undated) RX ORDER — FENTANYL CITRATE 50 UG/ML
INJECTION, SOLUTION INTRAMUSCULAR; INTRAVENOUS
Status: DISPENSED
Start: 2018-10-29

## (undated) RX ORDER — ONDANSETRON 2 MG/ML
INJECTION INTRAMUSCULAR; INTRAVENOUS
Status: DISPENSED
Start: 2017-11-22

## (undated) RX ORDER — NEOSTIGMINE METHYLSULFATE 1 MG/ML
VIAL (ML) INJECTION
Status: DISPENSED
Start: 2017-11-22

## (undated) RX ORDER — DEXAMETHASONE SODIUM PHOSPHATE 10 MG/ML
INJECTION, SOLUTION INTRAMUSCULAR; INTRAVENOUS
Status: DISPENSED
Start: 2017-11-22

## (undated) RX ORDER — DEXAMETHASONE SODIUM PHOSPHATE 10 MG/ML
INJECTION, SOLUTION INTRAMUSCULAR; INTRAVENOUS
Status: DISPENSED
Start: 2018-10-29

## (undated) RX ORDER — ONDANSETRON 2 MG/ML
INJECTION INTRAMUSCULAR; INTRAVENOUS
Status: DISPENSED
Start: 2018-10-29